# Patient Record
Sex: FEMALE | Race: WHITE | Employment: OTHER | URBAN - NONMETROPOLITAN AREA
[De-identification: names, ages, dates, MRNs, and addresses within clinical notes are randomized per-mention and may not be internally consistent; named-entity substitution may affect disease eponyms.]

---

## 2018-05-15 ENCOUNTER — HOSPITAL ENCOUNTER (OUTPATIENT)
Dept: MAMMOGRAPHY | Age: 73
Discharge: HOME | End: 2018-05-15
Payer: MEDICARE

## 2018-05-15 DIAGNOSIS — Z12.39 BREAST CANCER SCREENING: ICD-10-CM

## 2018-05-15 PROCEDURE — 77067 SCR MAMMO BI INCL CAD: CPT

## 2018-08-29 ENCOUNTER — HOSPITAL ENCOUNTER (OUTPATIENT)
Dept: ULTRASOUND IMAGING | Age: 73
Discharge: HOME | End: 2018-08-29
Payer: MEDICARE

## 2018-08-29 DIAGNOSIS — R31.0 GROSS HEMATURIA: ICD-10-CM

## 2018-08-29 PROCEDURE — 76770 US EXAM ABDO BACK WALL COMP: CPT

## 2019-05-21 ENCOUNTER — HOSPITAL ENCOUNTER (OUTPATIENT)
Dept: MAMMOGRAPHY | Age: 74
Discharge: HOME | End: 2019-05-21
Payer: MEDICARE

## 2019-05-21 DIAGNOSIS — Z12.31 VISIT FOR SCREENING MAMMOGRAM: ICD-10-CM

## 2019-05-21 PROCEDURE — 77067 SCR MAMMO BI INCL CAD: CPT

## 2020-06-01 ENCOUNTER — HOSPITAL ENCOUNTER (OUTPATIENT)
Dept: MAMMOGRAPHY | Age: 75
Discharge: HOME | End: 2020-06-01
Payer: MEDICARE

## 2020-06-01 PROCEDURE — 77063 BREAST TOMOSYNTHESIS BI: CPT

## 2021-03-15 ENCOUNTER — NURSE ONLY (OUTPATIENT)
Dept: LAB | Age: 76
End: 2021-03-15

## 2021-03-15 LAB
ALBUMIN SERPL-MCNC: 4.3 G/DL (ref 3.5–5.1)
ALP BLD-CCNC: 62 U/L (ref 38–126)
ALT SERPL-CCNC: 28 U/L (ref 11–66)
ANION GAP SERPL CALCULATED.3IONS-SCNC: 10 MEQ/L (ref 8–16)
AST SERPL-CCNC: 34 U/L (ref 5–40)
AVERAGE GLUCOSE: 105 MG/DL (ref 70–126)
BILIRUB SERPL-MCNC: 0.4 MG/DL (ref 0.3–1.2)
BUN BLDV-MCNC: 20 MG/DL (ref 7–22)
CALCIUM SERPL-MCNC: 9.7 MG/DL (ref 8.5–10.5)
CHLORIDE BLD-SCNC: 97 MEQ/L (ref 98–111)
CO2: 28 MEQ/L (ref 23–33)
CREAT SERPL-MCNC: 0.7 MG/DL (ref 0.4–1.2)
GFR SERPL CREATININE-BSD FRML MDRD: 81 ML/MIN/1.73M2
GLUCOSE BLD-MCNC: 97 MG/DL (ref 70–108)
HBA1C MFR BLD: 5.5 % (ref 4.4–6.4)
POTASSIUM SERPL-SCNC: 4.3 MEQ/L (ref 3.5–5.2)
SODIUM BLD-SCNC: 135 MEQ/L (ref 135–145)
TOTAL PROTEIN: 7.4 G/DL (ref 6.1–8)

## 2021-06-02 ENCOUNTER — HOSPITAL ENCOUNTER (OUTPATIENT)
Dept: MAMMOGRAPHY | Age: 76
Discharge: HOME | End: 2021-06-02
Payer: MEDICARE

## 2021-06-02 DIAGNOSIS — Z12.39 ENCOUNTER FOR OTHER SCREENING FOR MALIGNANT NEOPLASM OF BREAST: ICD-10-CM

## 2021-06-02 PROCEDURE — 77063 BREAST TOMOSYNTHESIS BI: CPT

## 2022-03-04 ENCOUNTER — NURSE ONLY (OUTPATIENT)
Dept: LAB | Age: 77
End: 2022-03-04

## 2022-03-04 LAB
ALBUMIN SERPL-MCNC: 4.3 G/DL (ref 3.5–5.1)
ALP BLD-CCNC: 73 U/L (ref 38–126)
ALT SERPL-CCNC: 27 U/L (ref 11–66)
ANION GAP SERPL CALCULATED.3IONS-SCNC: 15 MEQ/L (ref 8–16)
AST SERPL-CCNC: 33 U/L (ref 5–40)
AVERAGE GLUCOSE: 105 MG/DL (ref 70–126)
BILIRUB SERPL-MCNC: 0.4 MG/DL (ref 0.3–1.2)
BUN BLDV-MCNC: 28 MG/DL (ref 7–22)
CALCIUM SERPL-MCNC: 9.8 MG/DL (ref 8.5–10.5)
CHLORIDE BLD-SCNC: 97 MEQ/L (ref 98–111)
CHOLESTEROL, TOTAL: 218 MG/DL (ref 100–199)
CO2: 25 MEQ/L (ref 23–33)
CREAT SERPL-MCNC: 0.8 MG/DL (ref 0.4–1.2)
GFR SERPL CREATININE-BSD FRML MDRD: 70 ML/MIN/1.73M2
GLUCOSE BLD-MCNC: 96 MG/DL (ref 70–108)
HBA1C MFR BLD: 5.5 % (ref 4.4–6.4)
HDLC SERPL-MCNC: 57 MG/DL
LDL CHOLESTEROL CALCULATED: 144 MG/DL
POTASSIUM SERPL-SCNC: 4.3 MEQ/L (ref 3.5–5.2)
SODIUM BLD-SCNC: 137 MEQ/L (ref 135–145)
TOTAL PROTEIN: 6.7 G/DL (ref 6.1–8)
TRIGL SERPL-MCNC: 85 MG/DL (ref 0–199)

## 2022-06-06 ENCOUNTER — HOSPITAL ENCOUNTER (OUTPATIENT)
Dept: MAMMOGRAPHY | Age: 77
Discharge: HOME | End: 2022-06-06
Payer: MEDICARE

## 2022-06-06 DIAGNOSIS — Z12.31 VISIT FOR SCREENING MAMMOGRAM: ICD-10-CM

## 2022-06-06 PROCEDURE — 77067 SCR MAMMO BI INCL CAD: CPT

## 2022-06-06 PROCEDURE — 77063 BREAST TOMOSYNTHESIS BI: CPT

## 2023-06-07 ENCOUNTER — HOSPITAL ENCOUNTER (OUTPATIENT)
Dept: MAMMOGRAPHY | Age: 78
Discharge: HOME | End: 2023-06-07
Payer: MEDICARE

## 2023-06-07 DIAGNOSIS — Z12.31 VISIT FOR SCREENING MAMMOGRAM: ICD-10-CM

## 2023-06-07 PROCEDURE — 77063 BREAST TOMOSYNTHESIS BI: CPT

## 2024-03-11 ENCOUNTER — APPOINTMENT (OUTPATIENT)
Dept: GENERAL RADIOLOGY | Age: 79
DRG: 315 | End: 2024-03-11
Payer: MEDICARE

## 2024-03-11 ENCOUNTER — HOSPITAL ENCOUNTER (INPATIENT)
Age: 79
LOS: 7 days | Discharge: NURSING, REHAB OR LONG-TERM CARE | DRG: 315 | End: 2024-03-19
Attending: EMERGENCY MEDICINE | Admitting: STUDENT IN AN ORGANIZED HEALTH CARE EDUCATION/TRAINING PROGRAM
Payer: MEDICARE

## 2024-03-11 DIAGNOSIS — I48.91 ATRIAL FIBRILLATION WITH RAPID VENTRICULAR RESPONSE (HCC): Primary | ICD-10-CM

## 2024-03-11 DIAGNOSIS — Z96.651 STATUS POST RIGHT KNEE REPLACEMENT: ICD-10-CM

## 2024-03-11 LAB
ALBUMIN SERPL BCG-MCNC: 3.8 G/DL (ref 3.5–5.1)
ALP SERPL-CCNC: 81 U/L (ref 38–126)
ALT SERPL W/O P-5'-P-CCNC: 86 U/L (ref 11–66)
AMPHETAMINES UR QL SCN: NEGATIVE
ANION GAP SERPL CALC-SCNC: 12 MEQ/L (ref 8–16)
AST SERPL-CCNC: 114 U/L (ref 5–40)
BACTERIA URNS QL MICRO: ABNORMAL /HPF
BARBITURATES UR QL SCN: NEGATIVE
BASOPHILS ABSOLUTE: 0 THOU/MM3 (ref 0–0.1)
BASOPHILS NFR BLD AUTO: 0.3 %
BENZODIAZ UR QL SCN: POSITIVE
BILIRUB CONJ SERPL-MCNC: 0.3 MG/DL (ref 0–0.3)
BILIRUB SERPL-MCNC: 0.5 MG/DL (ref 0.3–1.2)
BILIRUB UR QL STRIP.AUTO: NEGATIVE
BUN SERPL-MCNC: 20 MG/DL (ref 7–22)
BZE UR QL SCN: NEGATIVE
CALCIUM SERPL-MCNC: 8.9 MG/DL (ref 8.5–10.5)
CANNABINOIDS UR QL SCN: NEGATIVE
CASTS #/AREA URNS LPF: ABNORMAL /LPF
CASTS 2: ABNORMAL /LPF
CHARACTER UR: CLEAR
CHLORIDE SERPL-SCNC: 99 MEQ/L (ref 98–111)
CO2 SERPL-SCNC: 21 MEQ/L (ref 23–33)
COLOR: YELLOW
CREAT SERPL-MCNC: 0.7 MG/DL (ref 0.4–1.2)
CRYSTALS URNS MICRO: ABNORMAL
DEPRECATED RDW RBC AUTO: 45 FL (ref 35–45)
EKG Q-T INTERVAL: 322 MS
EKG QRS DURATION: 80 MS
EKG QTC CALCULATION (BAZETT): 479 MS
EKG R AXIS: 74 DEGREES
EKG T AXIS: -90 DEGREES
EKG VENTRICULAR RATE: 133 BPM
EOSINOPHIL NFR BLD AUTO: 0.5 %
EOSINOPHILS ABSOLUTE: 0.1 THOU/MM3 (ref 0–0.4)
EPITHELIAL CELLS, UA: ABNORMAL /HPF
ERYTHROCYTE [DISTWIDTH] IN BLOOD BY AUTOMATED COUNT: 12.4 % (ref 11.5–14.5)
ETHANOL SERPL-MCNC: < 0.01 %
FENTANYL: NEGATIVE
GFR SERPL CREATININE-BSD FRML MDRD: > 60 ML/MIN/1.73M2
GLUCOSE SERPL-MCNC: 100 MG/DL (ref 70–108)
GLUCOSE UR QL STRIP.AUTO: NEGATIVE MG/DL
HCT VFR BLD AUTO: 39.4 % (ref 37–47)
HGB BLD-MCNC: 12.7 GM/DL (ref 12–16)
HGB UR QL STRIP.AUTO: ABNORMAL
IMM GRANULOCYTES # BLD AUTO: 0.08 THOU/MM3 (ref 0–0.07)
IMM GRANULOCYTES NFR BLD AUTO: 0.6 %
KETONES UR QL STRIP.AUTO: 15
LIPASE SERPL-CCNC: 31.3 U/L (ref 5.6–51.3)
LYMPHOCYTES ABSOLUTE: 0.9 THOU/MM3 (ref 1–4.8)
LYMPHOCYTES NFR BLD AUTO: 6.5 %
MAGNESIUM SERPL-MCNC: 2 MG/DL (ref 1.6–2.4)
MCH RBC QN AUTO: 31.5 PG (ref 26–33)
MCHC RBC AUTO-ENTMCNC: 32.2 GM/DL (ref 32.2–35.5)
MCV RBC AUTO: 97.8 FL (ref 81–99)
MISCELLANEOUS 2: ABNORMAL
MONOCYTES ABSOLUTE: 0.4 THOU/MM3 (ref 0.4–1.3)
MONOCYTES NFR BLD AUTO: 2.9 %
NEUTROPHILS NFR BLD AUTO: 89.2 %
NITRITE UR QL STRIP: NEGATIVE
NRBC BLD AUTO-RTO: 0 /100 WBC
NT-PROBNP SERPL IA-MCNC: 1012 PG/ML (ref 0–449)
OPIATES UR QL SCN: POSITIVE
OSMOLALITY SERPL CALC.SUM OF ELEC: 267.2 MOSMOL/KG (ref 275–300)
OXYCODONE: NEGATIVE
PCP UR QL SCN: NEGATIVE
PH UR STRIP.AUTO: 5.5 [PH] (ref 5–9)
PLATELET # BLD AUTO: 225 THOU/MM3 (ref 130–400)
PMV BLD AUTO: 8.4 FL (ref 9.4–12.4)
POTASSIUM SERPL-SCNC: 4.4 MEQ/L (ref 3.5–5.2)
PROT SERPL-MCNC: 6.1 G/DL (ref 6.1–8)
PROT UR STRIP.AUTO-MCNC: NEGATIVE MG/DL
RBC # BLD AUTO: 4.03 MILL/MM3 (ref 4.2–5.4)
RBC URINE: ABNORMAL /HPF
RENAL EPI CELLS #/AREA URNS HPF: ABNORMAL /[HPF]
SEGMENTED NEUTROPHILS ABSOLUTE COUNT: 12.3 THOU/MM3 (ref 1.8–7.7)
SODIUM SERPL-SCNC: 132 MEQ/L (ref 135–145)
SP GR UR REFRACT.AUTO: 1.02 (ref 1–1.03)
TROPONIN, HIGH SENSITIVITY: 12 NG/L (ref 0–12)
TSH SERPL DL<=0.005 MIU/L-ACNC: 3.62 UIU/ML (ref 0.4–4.2)
UROBILINOGEN, URINE: 0.2 EU/DL (ref 0–1)
WBC # BLD AUTO: 13.8 THOU/MM3 (ref 4.8–10.8)
WBC #/AREA URNS HPF: ABNORMAL /HPF
WBC #/AREA URNS HPF: NEGATIVE /[HPF]
YEAST LIKE FUNGI URNS QL MICRO: ABNORMAL

## 2024-03-11 PROCEDURE — 84443 ASSAY THYROID STIM HORMONE: CPT

## 2024-03-11 PROCEDURE — 36415 COLL VENOUS BLD VENIPUNCTURE: CPT

## 2024-03-11 PROCEDURE — 81001 URINALYSIS AUTO W/SCOPE: CPT

## 2024-03-11 PROCEDURE — 99285 EMERGENCY DEPT VISIT HI MDM: CPT

## 2024-03-11 PROCEDURE — 83735 ASSAY OF MAGNESIUM: CPT

## 2024-03-11 PROCEDURE — 82077 ASSAY SPEC XCP UR&BREATH IA: CPT

## 2024-03-11 PROCEDURE — 96360 HYDRATION IV INFUSION INIT: CPT

## 2024-03-11 PROCEDURE — 84484 ASSAY OF TROPONIN QUANT: CPT

## 2024-03-11 PROCEDURE — 2580000003 HC RX 258: Performed by: EMERGENCY MEDICINE

## 2024-03-11 PROCEDURE — 80053 COMPREHEN METABOLIC PANEL: CPT

## 2024-03-11 PROCEDURE — 85025 COMPLETE CBC W/AUTO DIFF WBC: CPT

## 2024-03-11 PROCEDURE — 82248 BILIRUBIN DIRECT: CPT

## 2024-03-11 PROCEDURE — 80307 DRUG TEST PRSMV CHEM ANLYZR: CPT

## 2024-03-11 PROCEDURE — 93010 ELECTROCARDIOGRAM REPORT: CPT | Performed by: INTERNAL MEDICINE

## 2024-03-11 PROCEDURE — 93005 ELECTROCARDIOGRAM TRACING: CPT | Performed by: EMERGENCY MEDICINE

## 2024-03-11 PROCEDURE — 81003 URINALYSIS AUTO W/O SCOPE: CPT

## 2024-03-11 PROCEDURE — 83690 ASSAY OF LIPASE: CPT

## 2024-03-11 PROCEDURE — 83880 ASSAY OF NATRIURETIC PEPTIDE: CPT

## 2024-03-11 PROCEDURE — 71045 X-RAY EXAM CHEST 1 VIEW: CPT

## 2024-03-11 RX ORDER — 0.9 % SODIUM CHLORIDE 0.9 %
1000 INTRAVENOUS SOLUTION INTRAVENOUS ONCE
Status: COMPLETED | OUTPATIENT
Start: 2024-03-11 | End: 2024-03-11

## 2024-03-11 RX ORDER — BISOPROLOL FUMARATE AND HYDROCHLOROTHIAZIDE 10; 6.25 MG/1; MG/1
1 TABLET ORAL DAILY
Status: ON HOLD | COMMUNITY
End: 2024-03-19 | Stop reason: HOSPADM

## 2024-03-11 RX ADMIN — SODIUM CHLORIDE 1000 ML: 9 INJECTION, SOLUTION INTRAVENOUS at 16:51

## 2024-03-11 NOTE — ED TRIAGE NOTES
Pt presents to the ED from [facility] post left knee replacement for evaluation of new onset afib-RVR and hypotension during the procedure. EMS reports the afib first occurred during the procedure which started at 1410. A second EKG is ordered at 1510 to confirm afib. Post procedure pt was given metoprolol and a liter of crystalloid fluids. Per EMS pt was induced with ketamine and propofol. She is not anticoagulated. On presentation pt opens eyes to voice. She is completely oriented. There is a 20G present in the right wrist. Pt appears pallored  and the skin in cool. Dr. Moyer present at the conclusion of triage.

## 2024-03-11 NOTE — ED PROVIDER NOTES
overnight stay.  They will see the patient in the morning.  I then called the hospitalist group discussed case with them.  They graciously excepted the admission.  I did tell them that orthopedics does not plan on starting any kind of anticoagulation till the morning.  Patient was only in atrial fibrillation for a matter of hours.  She has no history of bleeding risk.  I feel that this would be safe.  We had a long discussion with the patient at bedside who understood and agreed with the plan.  Patient is admitted in stable condition pending further evaluation and treatment.    CRITICAL CARE:   None    CONSULTS:  Orthopedics  Hospitalist    PROCEDURES:  None    FINAL IMPRESSION      1. Atrial fibrillation with rapid ventricular response (HCC)    2. Status post right knee replacement          DISPOSITION/PLAN   Admission    PATIENT REFERRED TO:  No follow-up provider specified.    DISCHARGE MEDICATIONS:  New Prescriptions    No medications on file       (Please note that portions of this note were completed with a voice recognition program.  Efforts were made to edit the dictations but occasionally words are mis-transcribed.)    DO João WANG John T, DO  03/12/24 0130

## 2024-03-11 NOTE — ED NOTES
ED to inpatient nurses report      Chief Complaint:  Chief Complaint   Patient presents with    Tachycardia    Hypotension     Present to ED from: home    MOA:     LOC: alert and orientated to name, place, date  Mobility: Requires assistance * 1  Oxygen Baseline: RA    Current needs required: RA     Code Status:   No Order    What abnormal results were found and what did you give/do to treat them? AFIB-RVR  Any procedures or intervention occur? no    Mental Status:  Level of Consciousness: Alert (0)    Psych Assessment:        Vitals:  Patient Vitals for the past 24 hrs:   BP Temp Pulse Resp SpO2 Height Weight   03/11/24 1829 132/74 -- 94 16 99 % -- --   03/11/24 1749 94/64 97.3 °F (36.3 °C) (!) 124 16 96 % 1.6 m (5' 3\") 63.5 kg (140 lb)   03/11/24 1659 105/68 -- (!) 122 -- 96 % -- --   03/11/24 1646 -- -- (!) 133 -- 94 % -- --   03/11/24 1642 (!) 95/18 -- (!) 130 -- -- -- --        LDAs:   Peripheral IV 03/11/24 Posterior;Right Hand (Active)   Site Assessment Clean, dry & intact 03/11/24 1755   Line Status Normal saline locked 03/11/24 1755   Line Care Connections checked and tightened 03/11/24 1755   Phlebitis Assessment No symptoms 03/11/24 1755   Infiltration Assessment 0 03/11/24 1755   Dressing Status Clean, dry & intact 03/11/24 1755   Dressing Type Transparent 03/11/24 1755       Peripheral IV 03/11/24 Distal;Left;Posterior Forearm (Active)   Site Assessment Clean, dry & intact 03/11/24 1650   Line Status Normal saline locked 03/11/24 1650   Line Care Connections checked and tightened 03/11/24 1650   Phlebitis Assessment No symptoms 03/11/24 1650   Infiltration Assessment 0 03/11/24 1650   Dressing Status Clean, dry & intact 03/11/24 1650   Dressing Type Transparent 03/11/24 1650       Ambulatory Status:  No data recorded    Diagnosis:  DISPOSITION Decision To Admit 03/11/2024 06:56:31 PM   Final diagnoses:   Atrial fibrillation with rapid ventricular response (HCC)   Status post right knee replacement

## 2024-03-12 ENCOUNTER — APPOINTMENT (OUTPATIENT)
Age: 79
DRG: 315 | End: 2024-03-12
Attending: NURSE PRACTITIONER
Payer: MEDICARE

## 2024-03-12 PROBLEM — E87.1 CHRONIC HYPONATREMIA: Status: ACTIVE | Noted: 2024-03-12

## 2024-03-12 PROBLEM — D72.829 LEUKOCYTOSIS: Status: ACTIVE | Noted: 2024-03-12

## 2024-03-12 PROBLEM — Z96.652 S/P TKR (TOTAL KNEE REPLACEMENT), LEFT: Status: ACTIVE | Noted: 2024-03-12

## 2024-03-12 PROBLEM — I48.91 ATRIAL FIBRILLATION WITH RAPID VENTRICULAR RESPONSE (HCC): Status: ACTIVE | Noted: 2024-03-12

## 2024-03-12 PROBLEM — R74.8 ELEVATED LIVER ENZYMES: Status: ACTIVE | Noted: 2024-03-12

## 2024-03-12 PROBLEM — Z86.79 HISTORY OF ESSENTIAL HYPERTENSION: Status: ACTIVE | Noted: 2024-03-12

## 2024-03-12 PROBLEM — I48.91 NEW ONSET ATRIAL FIBRILLATION (HCC): Status: ACTIVE | Noted: 2024-03-12

## 2024-03-12 LAB
ALBUMIN SERPL BCG-MCNC: 3.3 G/DL (ref 3.5–5.1)
ALP SERPL-CCNC: 73 U/L (ref 38–126)
ALT SERPL W/O P-5'-P-CCNC: 70 U/L (ref 11–66)
ANION GAP SERPL CALC-SCNC: 13 MEQ/L (ref 8–16)
APAP SERPL-MCNC: < 5 UG/ML (ref 0–20)
AST SERPL-CCNC: 65 U/L (ref 5–40)
BASOPHILS ABSOLUTE: 0 THOU/MM3 (ref 0–0.1)
BASOPHILS NFR BLD AUTO: 0 %
BILIRUB SERPL-MCNC: 0.3 MG/DL (ref 0.3–1.2)
BUN SERPL-MCNC: 20 MG/DL (ref 7–22)
CALCIUM SERPL-MCNC: 8.7 MG/DL (ref 8.5–10.5)
CHLORIDE SERPL-SCNC: 100 MEQ/L (ref 98–111)
CHOLEST SERPL-MCNC: 154 MG/DL (ref 100–199)
CO2 SERPL-SCNC: 19 MEQ/L (ref 23–33)
CREAT SERPL-MCNC: 0.7 MG/DL (ref 0.4–1.2)
CRP SERPL-MCNC: 0.91 MG/DL (ref 0–1)
DEPRECATED RDW RBC AUTO: 44.4 FL (ref 35–45)
ECHO AO ASC DIAM: 2.4 CM
ECHO AO ASCENDING AORTA INDEX: 1.45 CM/M2
ECHO AV CUSP MM: 1.7 CM
ECHO AV PEAK GRADIENT: 6 MMHG
ECHO AV PEAK VELOCITY: 1.2 M/S
ECHO AV VELOCITY RATIO: 0.75
ECHO BSA: 1.68 M2
ECHO EST RA PRESSURE: 5 MMHG
ECHO LA AREA 2C: 10.2 CM2
ECHO LA AREA 4C: 10.9 CM2
ECHO LA DIAMETER INDEX: 1.63 CM/M2
ECHO LA DIAMETER: 2.7 CM
ECHO LA MAJOR AXIS: 3.7 CM
ECHO LA MINOR AXIS: 3.8 CM
ECHO LA VOL BP: 23 ML (ref 22–52)
ECHO LA VOL MOD A2C: 20 ML (ref 22–52)
ECHO LA VOL MOD A4C: 25 ML (ref 22–52)
ECHO LA VOL/BSA BIPLANE: 14 ML/M2 (ref 16–34)
ECHO LA VOLUME INDEX MOD A2C: 12 ML/M2 (ref 16–34)
ECHO LA VOLUME INDEX MOD A4C: 15 ML/M2 (ref 16–34)
ECHO LV FRACTIONAL SHORTENING: 29 % (ref 28–44)
ECHO LV INTERNAL DIMENSION DIASTOLE INDEX: 2.11 CM/M2
ECHO LV INTERNAL DIMENSION DIASTOLIC: 3.5 CM (ref 3.9–5.3)
ECHO LV INTERNAL DIMENSION SYSTOLIC INDEX: 1.51 CM/M2
ECHO LV INTERNAL DIMENSION SYSTOLIC: 2.5 CM
ECHO LV ISOVOLUMETRIC RELAXATION TIME (IVRT): 56 MS
ECHO LV IVSD: 0.9 CM (ref 0.6–0.9)
ECHO LV MASS 2D: 75.3 G (ref 67–162)
ECHO LV MASS INDEX 2D: 45.4 G/M2 (ref 43–95)
ECHO LV POSTERIOR WALL DIASTOLIC: 0.7 CM (ref 0.6–0.9)
ECHO LV RELATIVE WALL THICKNESS RATIO: 0.4
ECHO LVOT PEAK GRADIENT: 3 MMHG
ECHO LVOT PEAK VELOCITY: 0.9 M/S
ECHO MV A VELOCITY: 0.71 M/S
ECHO MV E DECELERATION TIME (DT): 201 MS
ECHO MV E VELOCITY: 1.03 M/S
ECHO MV E/A RATIO: 1.45
ECHO MV REGURGITANT PEAK GRADIENT: 55 MMHG
ECHO MV REGURGITANT PEAK VELOCITY: 3.7 M/S
ECHO PULMONARY ARTERY END DIASTOLIC PRESSURE: 3 MMHG
ECHO PV MAX VELOCITY: 0.7 M/S
ECHO PV PEAK GRADIENT: 2 MMHG
ECHO PV REGURGITANT MAX VELOCITY: 0.9 M/S
ECHO RIGHT VENTRICULAR SYSTOLIC PRESSURE (RVSP): 37 MMHG
ECHO RV INTERNAL DIMENSION: 2 CM
ECHO RV TAPSE: 2 CM (ref 1.7–?)
ECHO TV E WAVE: 0.7 M/S
ECHO TV REGURGITANT MAX VELOCITY: 2.81 M/S
ECHO TV REGURGITANT PEAK GRADIENT: 32 MMHG
EKG ATRIAL RATE: 85 BPM
EKG ATRIAL RATE: 96 BPM
EKG P AXIS: 63 DEGREES
EKG P AXIS: 64 DEGREES
EKG P-R INTERVAL: 150 MS
EKG P-R INTERVAL: 186 MS
EKG Q-T INTERVAL: 366 MS
EKG Q-T INTERVAL: 366 MS
EKG QRS DURATION: 70 MS
EKG QRS DURATION: 70 MS
EKG QTC CALCULATION (BAZETT): 435 MS
EKG QTC CALCULATION (BAZETT): 462 MS
EKG R AXIS: 45 DEGREES
EKG R AXIS: 61 DEGREES
EKG T AXIS: 32 DEGREES
EKG T AXIS: 40 DEGREES
EKG VENTRICULAR RATE: 85 BPM
EKG VENTRICULAR RATE: 96 BPM
EOSINOPHIL NFR BLD AUTO: 0 %
EOSINOPHILS ABSOLUTE: 0 THOU/MM3 (ref 0–0.4)
ERYTHROCYTE [DISTWIDTH] IN BLOOD BY AUTOMATED COUNT: 12.4 % (ref 11.5–14.5)
GFR SERPL CREATININE-BSD FRML MDRD: > 60 ML/MIN/1.73M2
GLUCOSE SERPL-MCNC: 115 MG/DL (ref 70–108)
HAV IGM SER QL: NEGATIVE
HBV CORE IGM SERPL QL IA: NEGATIVE
HBV SURFACE AG SERPL QL IA: NEGATIVE
HCT VFR BLD AUTO: 35 % (ref 37–47)
HCV IGG SERPL QL IA: NEGATIVE
HDLC SERPL-MCNC: 53 MG/DL
HGB BLD-MCNC: 11.3 GM/DL (ref 12–16)
IMM GRANULOCYTES # BLD AUTO: 0.02 THOU/MM3 (ref 0–0.07)
IMM GRANULOCYTES NFR BLD AUTO: 0.2 %
INR PPP: 0.95 (ref 0.85–1.13)
LDLC SERPL CALC-MCNC: 90 MG/DL
LYMPHOCYTES ABSOLUTE: 0.7 THOU/MM3 (ref 1–4.8)
LYMPHOCYTES NFR BLD AUTO: 7.4 %
MCH RBC QN AUTO: 31.5 PG (ref 26–33)
MCHC RBC AUTO-ENTMCNC: 32.3 GM/DL (ref 32.2–35.5)
MCV RBC AUTO: 97.5 FL (ref 81–99)
MONOCYTES ABSOLUTE: 0.7 THOU/MM3 (ref 0.4–1.3)
MONOCYTES NFR BLD AUTO: 6.7 %
NEUTROPHILS NFR BLD AUTO: 85.7 %
NRBC BLD AUTO-RTO: 0 /100 WBC
OSMOLALITY SERPL CALC.SUM OF ELEC: 268.1 MOSMOL/KG (ref 275–300)
OSMOLALITY SERPL: 282 MOSMOL/KG (ref 275–295)
PLATELET # BLD AUTO: 216 THOU/MM3 (ref 130–400)
PMV BLD AUTO: 8.5 FL (ref 9.4–12.4)
POTASSIUM SERPL-SCNC: 4.5 MEQ/L (ref 3.5–5.2)
PROT SERPL-MCNC: 5.7 G/DL (ref 6.1–8)
RBC # BLD AUTO: 3.59 MILL/MM3 (ref 4.2–5.4)
SALICYLATES SERPL-MCNC: 1.1 MG/DL (ref 2–10)
SEGMENTED NEUTROPHILS ABSOLUTE COUNT: 8.7 THOU/MM3 (ref 1.8–7.7)
SODIUM SERPL-SCNC: 132 MEQ/L (ref 135–145)
TRIGL SERPL-MCNC: 54 MG/DL (ref 0–199)
URATE SERPL-MCNC: 4.9 MG/DL (ref 2.4–5.7)
WBC # BLD AUTO: 10.1 THOU/MM3 (ref 4.8–10.8)

## 2024-03-12 PROCEDURE — 36415 COLL VENOUS BLD VENIPUNCTURE: CPT

## 2024-03-12 PROCEDURE — 6370000000 HC RX 637 (ALT 250 FOR IP): Performed by: NURSE PRACTITIONER

## 2024-03-12 PROCEDURE — 93306 TTE W/DOPPLER COMPLETE: CPT | Performed by: INTERNAL MEDICINE

## 2024-03-12 PROCEDURE — 80061 LIPID PANEL: CPT

## 2024-03-12 PROCEDURE — 93306 TTE W/DOPPLER COMPLETE: CPT

## 2024-03-12 PROCEDURE — 93010 ELECTROCARDIOGRAM REPORT: CPT | Performed by: INTERNAL MEDICINE

## 2024-03-12 PROCEDURE — 80179 DRUG ASSAY SALICYLATE: CPT

## 2024-03-12 PROCEDURE — 99223 1ST HOSP IP/OBS HIGH 75: CPT | Performed by: NURSE PRACTITIONER

## 2024-03-12 PROCEDURE — 1200000003 HC TELEMETRY R&B

## 2024-03-12 PROCEDURE — 99223 1ST HOSP IP/OBS HIGH 75: CPT | Performed by: INTERNAL MEDICINE

## 2024-03-12 PROCEDURE — 85610 PROTHROMBIN TIME: CPT

## 2024-03-12 PROCEDURE — 87040 BLOOD CULTURE FOR BACTERIA: CPT

## 2024-03-12 PROCEDURE — 85025 COMPLETE CBC W/AUTO DIFF WBC: CPT

## 2024-03-12 PROCEDURE — 84550 ASSAY OF BLOOD/URIC ACID: CPT

## 2024-03-12 PROCEDURE — 80053 COMPREHEN METABOLIC PANEL: CPT

## 2024-03-12 PROCEDURE — 97110 THERAPEUTIC EXERCISES: CPT

## 2024-03-12 PROCEDURE — 80143 DRUG ASSAY ACETAMINOPHEN: CPT

## 2024-03-12 PROCEDURE — 97162 PT EVAL MOD COMPLEX 30 MIN: CPT

## 2024-03-12 PROCEDURE — 2580000003 HC RX 258: Performed by: NURSE PRACTITIONER

## 2024-03-12 PROCEDURE — 6360000002 HC RX W HCPCS: Performed by: NURSE PRACTITIONER

## 2024-03-12 PROCEDURE — 6370000000 HC RX 637 (ALT 250 FOR IP): Performed by: PHYSICIAN ASSISTANT

## 2024-03-12 PROCEDURE — 93005 ELECTROCARDIOGRAM TRACING: CPT | Performed by: NURSE PRACTITIONER

## 2024-03-12 PROCEDURE — 80074 ACUTE HEPATITIS PANEL: CPT

## 2024-03-12 PROCEDURE — 83930 ASSAY OF BLOOD OSMOLALITY: CPT

## 2024-03-12 PROCEDURE — 86140 C-REACTIVE PROTEIN: CPT

## 2024-03-12 RX ORDER — ASPIRIN 325 MG
162 TABLET ORAL DAILY
Status: DISCONTINUED | OUTPATIENT
Start: 2024-03-12 | End: 2024-03-15

## 2024-03-12 RX ORDER — ENOXAPARIN SODIUM 100 MG/ML
1 INJECTION SUBCUTANEOUS EVERY 12 HOURS
Status: DISCONTINUED | OUTPATIENT
Start: 2024-03-12 | End: 2024-03-12

## 2024-03-12 RX ORDER — ONDANSETRON 2 MG/ML
4 INJECTION INTRAMUSCULAR; INTRAVENOUS EVERY 6 HOURS PRN
Status: DISCONTINUED | OUTPATIENT
Start: 2024-03-12 | End: 2024-03-19 | Stop reason: HOSPADM

## 2024-03-12 RX ORDER — POTASSIUM CHLORIDE 7.45 MG/ML
10 INJECTION INTRAVENOUS PRN
Status: DISCONTINUED | OUTPATIENT
Start: 2024-03-12 | End: 2024-03-19 | Stop reason: HOSPADM

## 2024-03-12 RX ORDER — SODIUM CHLORIDE 9 MG/ML
INJECTION, SOLUTION INTRAVENOUS PRN
Status: DISCONTINUED | OUTPATIENT
Start: 2024-03-12 | End: 2024-03-19 | Stop reason: HOSPADM

## 2024-03-12 RX ORDER — SODIUM CHLORIDE 0.9 % (FLUSH) 0.9 %
5-40 SYRINGE (ML) INJECTION EVERY 12 HOURS SCHEDULED
Status: DISCONTINUED | OUTPATIENT
Start: 2024-03-12 | End: 2024-03-19 | Stop reason: HOSPADM

## 2024-03-12 RX ORDER — MULTIVITAMIN WITH IRON
1 TABLET ORAL DAILY
Status: DISCONTINUED | OUTPATIENT
Start: 2024-03-12 | End: 2024-03-19 | Stop reason: HOSPADM

## 2024-03-12 RX ORDER — ACETAMINOPHEN 325 MG/1
650 TABLET ORAL EVERY 6 HOURS PRN
Status: DISCONTINUED | OUTPATIENT
Start: 2024-03-12 | End: 2024-03-18

## 2024-03-12 RX ORDER — ACETAMINOPHEN 500 MG
1000 TABLET ORAL
Status: ON HOLD | COMMUNITY

## 2024-03-12 RX ORDER — HYDROCODONE BITARTRATE AND ACETAMINOPHEN 5; 325 MG/1; MG/1
1 TABLET ORAL EVERY 4 HOURS PRN
Status: DISCONTINUED | OUTPATIENT
Start: 2024-03-12 | End: 2024-03-13

## 2024-03-12 RX ORDER — METOPROLOL SUCCINATE 100 MG/1
100 TABLET, EXTENDED RELEASE ORAL DAILY
Status: DISCONTINUED | OUTPATIENT
Start: 2024-03-12 | End: 2024-03-13

## 2024-03-12 RX ORDER — ACETAMINOPHEN 650 MG/1
650 SUPPOSITORY RECTAL EVERY 6 HOURS PRN
Status: DISCONTINUED | OUTPATIENT
Start: 2024-03-12 | End: 2024-03-18

## 2024-03-12 RX ORDER — ONDANSETRON 4 MG/1
4 TABLET, ORALLY DISINTEGRATING ORAL EVERY 8 HOURS PRN
Status: DISCONTINUED | OUTPATIENT
Start: 2024-03-12 | End: 2024-03-19 | Stop reason: HOSPADM

## 2024-03-12 RX ORDER — POTASSIUM CHLORIDE 20 MEQ/1
40 TABLET, EXTENDED RELEASE ORAL PRN
Status: DISCONTINUED | OUTPATIENT
Start: 2024-03-12 | End: 2024-03-19 | Stop reason: HOSPADM

## 2024-03-12 RX ORDER — MULTIVIT-MIN/IRON/FOLIC ACID/K 18-600-40
2000 CAPSULE ORAL DAILY
Status: ON HOLD | COMMUNITY

## 2024-03-12 RX ORDER — ONDANSETRON 4 MG/1
4 TABLET, ORALLY DISINTEGRATING ORAL EVERY 8 HOURS PRN
Status: DISCONTINUED | OUTPATIENT
Start: 2024-03-12 | End: 2024-03-15

## 2024-03-12 RX ORDER — SODIUM CHLORIDE 0.9 % (FLUSH) 0.9 %
5-40 SYRINGE (ML) INJECTION PRN
Status: DISCONTINUED | OUTPATIENT
Start: 2024-03-12 | End: 2024-03-19 | Stop reason: HOSPADM

## 2024-03-12 RX ORDER — POLYETHYLENE GLYCOL 3350 17 G/17G
17 POWDER, FOR SOLUTION ORAL DAILY PRN
Status: DISCONTINUED | OUTPATIENT
Start: 2024-03-12 | End: 2024-03-19 | Stop reason: HOSPADM

## 2024-03-12 RX ORDER — ONDANSETRON 2 MG/ML
4 INJECTION INTRAMUSCULAR; INTRAVENOUS EVERY 6 HOURS PRN
Status: DISCONTINUED | OUTPATIENT
Start: 2024-03-12 | End: 2024-03-15

## 2024-03-12 RX ORDER — MAGNESIUM SULFATE IN WATER 40 MG/ML
2000 INJECTION, SOLUTION INTRAVENOUS PRN
Status: DISCONTINUED | OUTPATIENT
Start: 2024-03-12 | End: 2024-03-19 | Stop reason: HOSPADM

## 2024-03-12 RX ORDER — DIPHENHYDRAMINE HCL 25 MG
25 CAPSULE ORAL
Status: ON HOLD | COMMUNITY

## 2024-03-12 RX ADMIN — ASPIRIN 162 MG: 325 TABLET ORAL at 12:28

## 2024-03-12 RX ADMIN — SODIUM CHLORIDE, PRESERVATIVE FREE 10 ML: 5 INJECTION INTRAVENOUS at 12:29

## 2024-03-12 RX ADMIN — HYDROCODONE BITARTRATE AND ACETAMINOPHEN 1 TABLET: 5; 325 TABLET ORAL at 19:26

## 2024-03-12 RX ADMIN — RIVAROXABAN 10 MG: 10 TABLET, FILM COATED ORAL at 18:21

## 2024-03-12 RX ADMIN — HYDROCODONE BITARTRATE AND ACETAMINOPHEN 1 TABLET: 5; 325 TABLET ORAL at 15:27

## 2024-03-12 RX ADMIN — ENOXAPARIN SODIUM 60 MG: 100 INJECTION SUBCUTANEOUS at 12:28

## 2024-03-12 RX ADMIN — SODIUM CHLORIDE, PRESERVATIVE FREE 10 ML: 5 INJECTION INTRAVENOUS at 22:13

## 2024-03-12 ASSESSMENT — PAIN DESCRIPTION - LOCATION
LOCATION: KNEE

## 2024-03-12 ASSESSMENT — PAIN SCALES - GENERAL
PAINLEVEL_OUTOF10: 10

## 2024-03-12 ASSESSMENT — PAIN DESCRIPTION - DESCRIPTORS
DESCRIPTORS: ACHING;DISCOMFORT
DESCRIPTORS: ACHING;DISCOMFORT

## 2024-03-12 ASSESSMENT — PAIN DESCRIPTION - ORIENTATION
ORIENTATION: LEFT

## 2024-03-12 ASSESSMENT — PAIN - FUNCTIONAL ASSESSMENT: PAIN_FUNCTIONAL_ASSESSMENT: NONE - DENIES PAIN

## 2024-03-12 NOTE — PLAN OF CARE
Patient admitted to room 8A12 from ED.  Patient oriented to room, call light, bed rails, phone, lights and bathroom.  Patient instructed about the schedule of the day including: vital sign frequency, lab draws, possible tests, frequency of MD and staff rounds, including RN/MD rounding together at bedside, daily weights, and I &O's.  Patient instructed about prescribed diet, how to use  television.   bed alarm in place, patient aware of placement and reason.   Telemetry box  in place, patient aware of placement and reason.  Bed locked, in lowest position, side rails up 2/4, call light within reach.  Will continue to monitor.

## 2024-03-12 NOTE — ED NOTES
Pt taken off of bedpan at this time. Pt voided and urine sample collected. Pt and family updated on plan of care.

## 2024-03-12 NOTE — CARE COORDINATION
Spoke with patient and daughter who advised son has ACP documents and can bring in this evening to have attached to chart. Denied concerns or needs.

## 2024-03-12 NOTE — PLAN OF CARE
Patient admitted after midnight.  Seen and examined at bedside.    -Cardiology consulted and following for new onset A-fib RVR following surgery.  -Echo pending, continue metoprolol 100 mg daily.  -30-day event monitor at discharge, decision for anticoagulation based on this.\  -Patient to work with PT/OT, plan to go to [facility] at discharge. unsure if she will need pre-CERT at this time.    Electronically signed by Mayuri Santacruz PA-C on 3/12/2024 at 2:29 PM

## 2024-03-12 NOTE — H&P
Hospitalist  History and Physical    Patient:  Kirstin Estrada  MRN: [id number]    CHIEF COMPLAINT:  tachycardia and hypotension    History Obtained From:  patient, electronic medical record  PCP: Evelio Hennessy MD    HISTORY OF PRESENT ILLNESS:   Kirstin Estrada is a 78 year old female presented to [facility] ER 3/11/24 via EMS from [facility] for evaluation of new onset atrial fibrillation RVR and hypotension during procedure.     Patient has a past medical history significant for lifetime nonsmoker, essential hypertension, dyslipidemia, hyponatremia.  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  Patient identifies on 2/27/2024 preop testing revealed urine with small amount of blood patient was treated with 5-day course of Cipro.  She also identifies sodium level was 124 patient had taken 4 days of salt tablets with repeat sodium on 3/7/2024 135.    Patient underwent left total knee replacement under the care of Dr. Beaulieu at [facility] on 3/11/2024 with spinal and nerve block ASA.    Kayla was noted to be in A-fib with RVR that was associated with hypotension. -140's and SBP .  ER documentation reveals they gave her IVP beta-blocker and IV fluids.   On arrival to [facility] ER heart rate 130 and B/P 95/18 EKG revealed Sinus Tachycardia.   Patient is awake alert and oriented. She is not complaining of any pain. She is still wearing off the anesthesia. Patient is able to move all her extremities. She can wiggle her toes. She has bilateral pneumatic leg compression devices on. Patient states she did take Ziac the AM of surgery.  She does not take any anticoagulation other than 81 mg aspirin.  Patient denied any shortness of breath, chest pain pressure heaviness or tightness.    Past Medical History:    See HPI    Past Surgical History:        Procedure Laterality Date    HYSTERECTOMY (CERVIX STATUS UNKNOWN)  1995    OVARY REMOVAL  1995       Medications Prior to Admission:    Prior to Admission medications    Medication Sig Start Date End Date Taking? 
show

## 2024-03-12 NOTE — PLAN OF CARE
Metoprolol not given. Patient stated she took her home dose of Ziac this AM. Educated patient on need to only take prescribed meds while here. Voiced understanding.

## 2024-03-12 NOTE — ED NOTES
Patient to be transferred to [facility]. Patient is in stable condition at this time. Floor staff Natalie notified prior to transfer.

## 2024-03-13 LAB
ANION GAP SERPL CALC-SCNC: 13 MEQ/L (ref 8–16)
BUN SERPL-MCNC: 16 MG/DL (ref 7–22)
CALCIUM SERPL-MCNC: 8.7 MG/DL (ref 8.5–10.5)
CHLORIDE SERPL-SCNC: 95 MEQ/L (ref 98–111)
CO2 SERPL-SCNC: 21 MEQ/L (ref 23–33)
CREAT SERPL-MCNC: 0.5 MG/DL (ref 0.4–1.2)
EKG ATRIAL RATE: 74 BPM
EKG P AXIS: 41 DEGREES
EKG P-R INTERVAL: 158 MS
EKG Q-T INTERVAL: 296 MS
EKG Q-T INTERVAL: 370 MS
EKG QRS DURATION: 70 MS
EKG QRS DURATION: 74 MS
EKG QTC CALCULATION (BAZETT): 410 MS
EKG QTC CALCULATION (BAZETT): 445 MS
EKG R AXIS: 61 DEGREES
EKG R AXIS: 81 DEGREES
EKG T AXIS: -44 DEGREES
EKG T AXIS: 29 DEGREES
EKG VENTRICULAR RATE: 136 BPM
EKG VENTRICULAR RATE: 74 BPM
GFR SERPL CREATININE-BSD FRML MDRD: > 60 ML/MIN/1.73M2
GLUCOSE SERPL-MCNC: 108 MG/DL (ref 70–108)
MAGNESIUM SERPL-MCNC: 1.7 MG/DL (ref 1.6–2.4)
OSMOLALITY UR: 727 MOSMOL/KG (ref 250–750)
POTASSIUM SERPL-SCNC: 3.9 MEQ/L (ref 3.5–5.2)
SODIUM SERPL-SCNC: 129 MEQ/L (ref 135–145)
SODIUM UR-SCNC: 106 MEQ/L

## 2024-03-13 PROCEDURE — 2140000000 HC CCU INTERMEDIATE R&B

## 2024-03-13 PROCEDURE — 6370000000 HC RX 637 (ALT 250 FOR IP): Performed by: PHYSICIAN ASSISTANT

## 2024-03-13 PROCEDURE — 93005 ELECTROCARDIOGRAM TRACING: CPT | Performed by: PHYSICIAN ASSISTANT

## 2024-03-13 PROCEDURE — 2500000003 HC RX 250 WO HCPCS: Performed by: REGISTERED NURSE

## 2024-03-13 PROCEDURE — 84300 ASSAY OF URINE SODIUM: CPT

## 2024-03-13 PROCEDURE — 2580000003 HC RX 258: Performed by: REGISTERED NURSE

## 2024-03-13 PROCEDURE — 2580000003 HC RX 258: Performed by: NURSE PRACTITIONER

## 2024-03-13 PROCEDURE — 83935 ASSAY OF URINE OSMOLALITY: CPT

## 2024-03-13 PROCEDURE — 83735 ASSAY OF MAGNESIUM: CPT

## 2024-03-13 PROCEDURE — 6370000000 HC RX 637 (ALT 250 FOR IP): Performed by: REGISTERED NURSE

## 2024-03-13 PROCEDURE — 99232 SBSQ HOSP IP/OBS MODERATE 35: CPT | Performed by: PHYSICIAN ASSISTANT

## 2024-03-13 PROCEDURE — 6370000000 HC RX 637 (ALT 250 FOR IP)

## 2024-03-13 PROCEDURE — 6370000000 HC RX 637 (ALT 250 FOR IP): Performed by: NURSE PRACTITIONER

## 2024-03-13 PROCEDURE — 36415 COLL VENOUS BLD VENIPUNCTURE: CPT

## 2024-03-13 PROCEDURE — 99232 SBSQ HOSP IP/OBS MODERATE 35: CPT | Performed by: REGISTERED NURSE

## 2024-03-13 PROCEDURE — 93010 ELECTROCARDIOGRAM REPORT: CPT | Performed by: INTERNAL MEDICINE

## 2024-03-13 PROCEDURE — 80048 BASIC METABOLIC PNL TOTAL CA: CPT

## 2024-03-13 RX ORDER — HYDROCODONE BITARTRATE AND ACETAMINOPHEN 5; 325 MG/1; MG/1
2 TABLET ORAL ONCE
Status: COMPLETED | OUTPATIENT
Start: 2024-03-13 | End: 2024-03-13

## 2024-03-13 RX ORDER — SODIUM CHLORIDE 0.9 % (FLUSH) 0.9 %
5-40 SYRINGE (ML) INJECTION EVERY 12 HOURS SCHEDULED
Status: CANCELLED | OUTPATIENT
Start: 2024-03-13

## 2024-03-13 RX ORDER — SODIUM CHLORIDE 9 MG/ML
INJECTION, SOLUTION INTRAVENOUS PRN
Status: CANCELLED | OUTPATIENT
Start: 2024-03-13

## 2024-03-13 RX ORDER — HYDROCODONE BITARTRATE AND ACETAMINOPHEN 5; 325 MG/1; MG/1
1 TABLET ORAL EVERY 4 HOURS PRN
Status: DISCONTINUED | OUTPATIENT
Start: 2024-03-13 | End: 2024-03-19 | Stop reason: HOSPADM

## 2024-03-13 RX ORDER — SODIUM CHLORIDE 0.9 % (FLUSH) 0.9 %
5-40 SYRINGE (ML) INJECTION PRN
Status: CANCELLED | OUTPATIENT
Start: 2024-03-13

## 2024-03-13 RX ORDER — METOPROLOL TARTRATE 1 MG/ML
5 INJECTION, SOLUTION INTRAVENOUS ONCE
Status: COMPLETED | OUTPATIENT
Start: 2024-03-13 | End: 2024-03-13

## 2024-03-13 RX ORDER — HYDROCODONE BITARTRATE AND ACETAMINOPHEN 5; 325 MG/1; MG/1
2 TABLET ORAL EVERY 4 HOURS PRN
Status: DISCONTINUED | OUTPATIENT
Start: 2024-03-13 | End: 2024-03-19 | Stop reason: HOSPADM

## 2024-03-13 RX ADMIN — HYDROCODONE BITARTRATE AND ACETAMINOPHEN 2 TABLET: 5; 325 TABLET ORAL at 04:36

## 2024-03-13 RX ADMIN — ASPIRIN 162 MG: 325 TABLET ORAL at 08:27

## 2024-03-13 RX ADMIN — APIXABAN 5 MG: 5 TABLET, FILM COATED ORAL at 17:26

## 2024-03-13 RX ADMIN — HYDROCODONE BITARTRATE AND ACETAMINOPHEN 2 TABLET: 5; 325 TABLET ORAL at 12:30

## 2024-03-13 RX ADMIN — SODIUM CHLORIDE, PRESERVATIVE FREE 10 ML: 5 INJECTION INTRAVENOUS at 08:28

## 2024-03-13 RX ADMIN — Medication 1 TABLET: at 08:27

## 2024-03-13 RX ADMIN — DILTIAZEM HYDROCHLORIDE 5 MG/HR: 5 INJECTION INTRAVENOUS at 10:13

## 2024-03-13 RX ADMIN — METOPROLOL SUCCINATE 100 MG: 100 TABLET, EXTENDED RELEASE ORAL at 08:27

## 2024-03-13 RX ADMIN — HYDROCODONE BITARTRATE AND ACETAMINOPHEN 1 TABLET: 5; 325 TABLET ORAL at 08:32

## 2024-03-13 RX ADMIN — DILTIAZEM HYDROCHLORIDE 10 MG/HR: 5 INJECTION INTRAVENOUS at 19:47

## 2024-03-13 RX ADMIN — HYDROCODONE BITARTRATE AND ACETAMINOPHEN 2 TABLET: 5; 325 TABLET ORAL at 21:14

## 2024-03-13 RX ADMIN — SODIUM CHLORIDE, PRESERVATIVE FREE 10 ML: 5 INJECTION INTRAVENOUS at 19:52

## 2024-03-13 RX ADMIN — METOPROLOL TARTRATE 5 MG: 5 INJECTION INTRAVENOUS at 08:32

## 2024-03-13 ASSESSMENT — PAIN DESCRIPTION - ORIENTATION
ORIENTATION: LEFT

## 2024-03-13 ASSESSMENT — PAIN SCALES - GENERAL
PAINLEVEL_OUTOF10: 6
PAINLEVEL_OUTOF10: 8
PAINLEVEL_OUTOF10: 10
PAINLEVEL_OUTOF10: 8
PAINLEVEL_OUTOF10: 10
PAINLEVEL_OUTOF10: 8
PAINLEVEL_OUTOF10: 0

## 2024-03-13 ASSESSMENT — PAIN DESCRIPTION - LOCATION
LOCATION: KNEE
LOCATION: LEG
LOCATION: LEG
LOCATION: KNEE

## 2024-03-13 ASSESSMENT — PAIN DESCRIPTION - DESCRIPTORS
DESCRIPTORS: ACHING;DISCOMFORT;TENDER
DESCRIPTORS: DISCOMFORT;ACHING
DESCRIPTORS: ACHING
DESCRIPTORS: ACHING

## 2024-03-13 ASSESSMENT — PAIN DESCRIPTION - ONSET: ONSET: ON-GOING

## 2024-03-13 ASSESSMENT — PAIN DESCRIPTION - PAIN TYPE
TYPE: SURGICAL PAIN
TYPE: SURGICAL PAIN

## 2024-03-13 ASSESSMENT — PAIN - FUNCTIONAL ASSESSMENT
PAIN_FUNCTIONAL_ASSESSMENT: PREVENTS OR INTERFERES SOME ACTIVE ACTIVITIES AND ADLS
PAIN_FUNCTIONAL_ASSESSMENT: PREVENTS OR INTERFERES SOME ACTIVE ACTIVITIES AND ADLS

## 2024-03-13 ASSESSMENT — PAIN DESCRIPTION - FREQUENCY: FREQUENCY: CONTINUOUS

## 2024-03-13 NOTE — ACP (ADVANCE CARE PLANNING)
Advance Care Planning     Advance Care Planning Inpatient Note  [facility] Department    Today's Date: 3/13/2024  Unit: [facility] 8AB    Received request from patient.  Upon review of chart and communication with care team, patient's decision making abilities are not in question.. Patient was/were present in the room during visit.    Goals of ACP Conversation:  Discuss advance care planning documents    Health Care Decision Makers:       Primary Decision Maker: Nura Estrada - Child - [phone]    Secondary Decision Maker: Ananda Alasele - Child - [phone]  Summary:  Completed New Documents  Updated Healthcare Decision Maker    Advance Care Planning Documents (Patient Wishes):  Healthcare Power of /Advance Directive Appointment of Health Care Agent  Living Will/Advance Directive     Assessment:   met with Kirstin to offer support for the updating / completion of Advance Directives documents as part of an Advance Care Planning conversation.  She was alert and oriented with decision-making capacity to express her wishes at this time.    Interventions:  Provided education on documents for clarity and greater understanding  Assisted in the completion of documents according to patient's wishes at this time  Encouraged ongoing ACP conversation with future decision makers and loved ones    Care Preferences Communicated:   No    Outcomes/Plan:  ACP Discussion: Completed  New advance directive completed.  Returned original document(s) to patient, as well as copies for distribution to appointed agents  Copy of advance directive given to staff to scan into medical record.    Electronically signed by Chaplain Rajat on 3/13/2024 at 3:14 PM

## 2024-03-13 NOTE — CARE COORDINATION
3/13/24, 3:02 PM EDT      DISCHARGE PLANNING EVALUATION    Kirstin Estrada  Admitted: 3/11/2024  Hospital Day: 1    Location: 8A-12/012-A Reason for admit: New onset atrial fibrillation (HCC) [I48.91]  Atrial fibrillation with rapid ventricular response (HCC) [I48.91]  Status post right knee replacement [Z96.651]    History reviewed. No pertinent past medical history.    Procedure: 3/11 CXR: 1. No consolidation.   2. Mild bronchial wall thickening.   Barriers to Discharge: Sent to ER from [facility] following L TKR with new Afib RVR. Cardiology consulted. IV Diltiazem.     PCP: Evelio Hennessy MD    Readmission Risk Low 0-14, Mod 15-19), High > 20: Readmission Risk Score: 10.2      Advance Directives:      Code Status: Full Code   Patient's Primary Decision Maker is: Legal Next of Kin      Patient Goals/Plan/Treatment Preferences: From home alone. SW assisting with placement as SNF. Looking at [facility].     Transportation/Food Security/Housekeeping Addressed: No issues identified.     If patient is discharged prior to next notation, then this note serves as note for discharge by case management.

## 2024-03-13 NOTE — PLAN OF CARE
Problem: Discharge Planning  Goal: Discharge to home or other facility with appropriate resources  3/13/2024 1126 by Tere Araujo LSW  Outcome: Progressing     Consult received. Please see SW note dated 3/13.

## 2024-03-13 NOTE — PLAN OF CARE
Problem: Discharge Planning  Goal: Discharge to home or other facility with appropriate resources  3/12/2024 2032 by Zeus Santiago RN  Outcome: Progressing  Flowsheets (Taken 3/12/2024 1915)  Discharge to home or other facility with appropriate resources: Identify barriers to discharge with patient and caregiver  3/12/2024 2032 by Zeus Santiago RN  Outcome: Progressing  Flowsheets (Taken 3/12/2024 1915)  Discharge to home or other facility with appropriate resources: Identify barriers to discharge with patient and caregiver  3/12/2024 1304 by Shahla Gerardo RN  Outcome: Progressing     Problem: ABCDS Injury Assessment  Goal: Absence of physical injury  3/12/2024 2032 by Zeus Santiago RN  Outcome: Progressing  3/12/2024 2032 by Zeus Santiago RN  Outcome: Progressing  3/12/2024 1304 by Shahla Gerardo RN  Outcome: Progressing     Problem: Safety - Adult  Goal: Free from fall injury  3/12/2024 2032 by Zeus Santiago RN  Outcome: Progressing  3/12/2024 2032 by Zeus Santiago RN  Outcome: Progressing     Problem: Pain  Goal: Verbalizes/displays adequate comfort level or baseline comfort level  3/12/2024 2032 by Zeus Santiago RN  Outcome: Progressing  3/12/2024 2032 by Zeus Santiago RN  Outcome: Progressing

## 2024-03-13 NOTE — FLOWSHEET NOTE
03/13/24 1857   Treatment Team Notification   Reason for Communication Review case   Treatment Team Role Attending Provider   Method of Communication Secure Message   Response No new orders     pt arrived to 3b BP 83/55 map 63, HR 58, I have turned down cardizem to 10mg and have her in trendelenberg. BP up a bit 93/60.     1852  also getting an EKS as pt has con

## 2024-03-13 NOTE — CARE COORDINATION
3/13/24, 11:43 AM EDT    DISCHARGE PLANNING EVALUATION    This SW did meet with patient and discussed placement at [facility]. Kayla states that she has already been in contact with them about going following her knee replacement on Monday.     Spoke with Galdino from facility, they are able to take on Friday afternoon once they have a discharge that morning.       Readmission Risk Low 0-14, Mod 15-19), High > 20: Readmission Risk Score: 11    Current PCP: Evelio Hennessy MD  PCP verified by CM? Yes    Patient Orientation: Alert and Oriented    Patient Cognition: Alert  History Provided by: Patient    Advance Directives:      Code Status: Full Code   Patient's Primary Decision Maker is: Legal Next of Kin       Discharge Planning:    Patient lives with: Alone Type of Home: House  Primary Care Giver: Self  Patient Support Systems include: Children   Current Financial resources: Medicare  Current community resources: None  Current services prior to admission: None            Current DME:              Type of Home Care services:  None    ADLS  Prior functional level: Independent in ADLs/IADLs  Current functional level: Assistance with the following:, Bathing, Dressing, Toileting, Housework, Mobility    Family can provide assistance at DC: No  Would you like Case Management to discuss the discharge plan with any other family members/significant others, and if so, who? No  Plans to Return to Present Housing: No  Other Identified Issues/Barriers to RETURNING to current housing: None  Potential Assistance needed at discharge: Skilled Nursing Facility            Potential DME:    Patient expects to discharge to: Skilled nursing facility  Plan for transportation at discharge: Family    Financial  Payor: MEDICARE / Plan: MEDICARE PART A AND B / Product Type: *No Product type* /     Potential assistance Purchasing Medications: No    Electronically signed by SINGH Fleming on 3/13/2024 at 11:44 AM

## 2024-03-14 ENCOUNTER — APPOINTMENT (OUTPATIENT)
Age: 79
DRG: 315 | End: 2024-03-14
Attending: INTERNAL MEDICINE
Payer: MEDICARE

## 2024-03-14 PROBLEM — Z96.652 S/P TOTAL KNEE ARTHROPLASTY, LEFT: Status: ACTIVE | Noted: 2024-03-14

## 2024-03-14 PROCEDURE — 99232 SBSQ HOSP IP/OBS MODERATE 35: CPT | Performed by: PHYSICIAN ASSISTANT

## 2024-03-14 PROCEDURE — 99232 SBSQ HOSP IP/OBS MODERATE 35: CPT | Performed by: REGISTERED NURSE

## 2024-03-14 PROCEDURE — 6370000000 HC RX 637 (ALT 250 FOR IP): Performed by: PHYSICIAN ASSISTANT

## 2024-03-14 PROCEDURE — 6370000000 HC RX 637 (ALT 250 FOR IP): Performed by: NURSE PRACTITIONER

## 2024-03-14 PROCEDURE — 97530 THERAPEUTIC ACTIVITIES: CPT

## 2024-03-14 PROCEDURE — 2140000000 HC CCU INTERMEDIATE R&B

## 2024-03-14 PROCEDURE — 6370000000 HC RX 637 (ALT 250 FOR IP): Performed by: REGISTERED NURSE

## 2024-03-14 PROCEDURE — 99222 1ST HOSP IP/OBS MODERATE 55: CPT | Performed by: STUDENT IN AN ORGANIZED HEALTH CARE EDUCATION/TRAINING PROGRAM

## 2024-03-14 PROCEDURE — 97116 GAIT TRAINING THERAPY: CPT

## 2024-03-14 PROCEDURE — 2580000003 HC RX 258: Performed by: NURSE PRACTITIONER

## 2024-03-14 PROCEDURE — 97110 THERAPEUTIC EXERCISES: CPT

## 2024-03-14 RX ORDER — METOPROLOL SUCCINATE 100 MG/1
100 TABLET, EXTENDED RELEASE ORAL 2 TIMES DAILY
Status: DISCONTINUED | OUTPATIENT
Start: 2024-03-14 | End: 2024-03-15

## 2024-03-14 RX ORDER — LANOLIN ALCOHOL/MO/W.PET/CERES
400 CREAM (GRAM) TOPICAL DAILY
Status: DISCONTINUED | OUTPATIENT
Start: 2024-03-14 | End: 2024-03-19 | Stop reason: HOSPADM

## 2024-03-14 RX ADMIN — HYDROCODONE BITARTRATE AND ACETAMINOPHEN 2 TABLET: 5; 325 TABLET ORAL at 20:49

## 2024-03-14 RX ADMIN — POTASSIUM BICARBONATE 50 MEQ: 782 TABLET, EFFERVESCENT ORAL at 10:10

## 2024-03-14 RX ADMIN — METOPROLOL SUCCINATE 100 MG: 50 TABLET, EXTENDED RELEASE ORAL at 10:08

## 2024-03-14 RX ADMIN — Medication 1 TABLET: at 10:07

## 2024-03-14 RX ADMIN — APIXABAN 5 MG: 5 TABLET, FILM COATED ORAL at 10:08

## 2024-03-14 RX ADMIN — APIXABAN 5 MG: 5 TABLET, FILM COATED ORAL at 20:51

## 2024-03-14 RX ADMIN — SODIUM CHLORIDE, PRESERVATIVE FREE 10 ML: 5 INJECTION INTRAVENOUS at 21:24

## 2024-03-14 RX ADMIN — Medication 400 MG: at 12:04

## 2024-03-14 RX ADMIN — HYDROCODONE BITARTRATE AND ACETAMINOPHEN 2 TABLET: 5; 325 TABLET ORAL at 03:59

## 2024-03-14 RX ADMIN — ASPIRIN 162 MG: 325 TABLET ORAL at 09:50

## 2024-03-14 RX ADMIN — METOPROLOL SUCCINATE 100 MG: 50 TABLET, EXTENDED RELEASE ORAL at 20:51

## 2024-03-14 RX ADMIN — HYDROCODONE BITARTRATE AND ACETAMINOPHEN 1 TABLET: 5; 325 TABLET ORAL at 09:51

## 2024-03-14 RX ADMIN — HYDROCODONE BITARTRATE AND ACETAMINOPHEN 1 TABLET: 5; 325 TABLET ORAL at 14:34

## 2024-03-14 ASSESSMENT — PAIN DESCRIPTION - PAIN TYPE
TYPE: SURGICAL PAIN

## 2024-03-14 ASSESSMENT — PAIN DESCRIPTION - DESCRIPTORS
DESCRIPTORS: ACHING
DESCRIPTORS: ACHING;DISCOMFORT
DESCRIPTORS: ACHING
DESCRIPTORS: ACHING

## 2024-03-14 ASSESSMENT — PAIN SCALES - GENERAL
PAINLEVEL_OUTOF10: 5
PAINLEVEL_OUTOF10: 0
PAINLEVEL_OUTOF10: 4
PAINLEVEL_OUTOF10: 7

## 2024-03-14 ASSESSMENT — PAIN DESCRIPTION - ONSET
ONSET: ON-GOING

## 2024-03-14 ASSESSMENT — PAIN - FUNCTIONAL ASSESSMENT
PAIN_FUNCTIONAL_ASSESSMENT: PREVENTS OR INTERFERES SOME ACTIVE ACTIVITIES AND ADLS
PAIN_FUNCTIONAL_ASSESSMENT: PREVENTS OR INTERFERES SOME ACTIVE ACTIVITIES AND ADLS
PAIN_FUNCTIONAL_ASSESSMENT: PREVENTS OR INTERFERES WITH MANY ACTIVE NOT PASSIVE ACTIVITIES
PAIN_FUNCTIONAL_ASSESSMENT: PREVENTS OR INTERFERES SOME ACTIVE ACTIVITIES AND ADLS

## 2024-03-14 ASSESSMENT — PAIN DESCRIPTION - ORIENTATION
ORIENTATION: LEFT

## 2024-03-14 ASSESSMENT — PAIN DESCRIPTION - FREQUENCY
FREQUENCY: CONTINUOUS

## 2024-03-14 ASSESSMENT — PAIN DESCRIPTION - LOCATION
LOCATION: KNEE
LOCATION: KNEE;LEG

## 2024-03-14 NOTE — CONSULTS
[place] [id number]                              CONSULTATION      PATIENT NAME: JUSTYNA LUNA              : 1945  MED REC NO: [id number]                       ROOM: 14  ACCOUNT NO: [id number]                       ADMIT DATE: 2024  PROVIDER: VIVIAN Gold      HISTORY OF PRESENT ILLNESS:  The patient is a pleasant 78-year-old female, who was a known patient of ours.  She had undergone a left total knee replacement on 2024.  The patient from an orthopedic standpoint was uncomplicated, but after the completion of the procedure, the patient had gone into new onset atrial fibrillation with tachycardia as well as hypotension.  The patient was transferred then to [facility] for further care.  The patient upon arrival to [facility] Emergency Department had converted back to normal sinus rhythm.  As I discussed with her earlier this morning, she really has no symptoms currently.  She denies chest pain, shortness of breath, nausea, and vomiting.  She states that she does have some mild left knee pain, otherwise, seems to be doing well.    PHYSICAL EXAM:  GENERAL:  She is a 78-year-old female.  Well developed, well nourished, appears in no acute distress.  Alert and oriented x3.  EXTREMITIES:  Left knee:  Inspection of her left knee shows the presence of a surgical dressing, but it is clean, dry, and intact.  Calf is soft and nontender.  She maintains active dorsiflexion and plantar flexion with adequate strength.  Sensation maintained throughout left lower extremity.  Compartments are soft, she is neurovascularly intact.    ASSESSMENT:  Postop day 1, status post left total knee replacement.    PLAN:  At this time, we are going to start direct oral anticoagulation.  I will leave the specific anticoagulant up to the internist or cardiologist.  She may weightbear as tolerated on this left lower extremity.  
    [facility]'s  Consult    Patient's Name/Date of Birth: Kirstin Estrada / 1945 (78 y.o.)    Date: March 12, 2024     Referring Provider: Mayuri Santacruz PA-C    CHIEF COMPLAINT: new onset atrial fibrillation from O    PMH: HTN, HLD, hyponatremia    HPI: This is a pleasant 78 y.o. female presents with new onset atrial fibrillation w/ RVR and hypotension during left total knee replacement w/ Dr. Beaulieu at [facility] 3/11/24. HR was noted to be 120-140s w/ SBP . Pt was given IVP lopressor and IV fluids post procedure.    Patient denies fever, chills, chest pain, shortness of breath, Isaac pain, nausea, vomiting, diarrhea, dysuria, hematuria, constipation, diarrhea.  Patient did state there was trace blood in her urine last month and was given ciprofloxacin by her primary care doctor for suspected UTI at that time.    Pt was atrial fibrillation w/ RVR on arrival to ED w/ . Pt received liter fluid bolus and self converted to NSR w/ HR 96.     BMP shows mild hyponatremia (chronic) and mild NAGMA. BNP 1012. Troponin 12. HFP shows elevated ALT and AST. TSH WNL. Etoh negative. CBC shows mild leukocytosis at 13.8. UDS positive for benzos and opiates. UA shows trace blood but not bacteria and negative nitrites/leukocytes.         Echo: No results found for this or any previous visit.  Heart catheterizations:  No results found for this or any previous visit.       All labs, EKG's, diagnostic testing and images as well as cardiac cath, stress testing were reviewed during this encounter    History reviewed. No pertinent past medical history.  Past Surgical History:   Procedure Laterality Date    HYSTERECTOMY (CERVIX STATUS UNKNOWN)  1995    OVARY REMOVAL  1995     Current Facility-Administered Medications   Medication Dose Route Frequency Provider Last Rate Last Admin    aspirin tablet 162 mg  162 mg Oral Daily Valentine Pickett APRN - SAVI        multivitamin 1 tablet  1 
Ortho Consult #[id number]  Pt POD 1 L TKA. New onset A Fib after procedure which has now converted.  Plan: WBAT. Start PT. Dressing change POD 2. Pain control. OK to start DOAC now. Will leave specific med up to Hospitalist/Cardiology. Scheduled f/u Dr. Salazar 2 wks.   
overpressure with heel slides to increase flexion ROM.  Ankle pumps, Quad sets, Heelslides, Hip abduction/adduction, and Straight leg raises.  Exercises were completed for increased independence with functional mobility.        Past Medical History:    History reviewed. No pertinent past medical history.    Past Surgical History:        Procedure Laterality Date    HYSTERECTOMY (CERVIX STATUS UNKNOWN)  1995    OVARY REMOVAL  1995       Allergies:    Allergies   Allergen Reactions    Sulfa Antibiotics Anaphylaxis        Current Medications:   Current Facility-Administered Medications: metoprolol succinate (TOPROL XL) extended release tablet 100 mg, 100 mg, Oral, BID  magnesium oxide (MAG-OX) tablet 400 mg, 400 mg, Oral, Daily  apixaban (ELIQUIS) tablet 5 mg, 5 mg, Oral, BID  HYDROcodone-acetaminophen (NORCO) 5-325 MG per tablet 1 tablet, 1 tablet, Oral, Q4H PRN **OR** HYDROcodone-acetaminophen (NORCO) 5-325 MG per tablet 2 tablet, 2 tablet, Oral, Q4H PRN  aspirin tablet 162 mg, 162 mg, Oral, Daily  multivitamin 1 tablet, 1 tablet, Oral, Daily  sodium chloride flush 0.9 % injection 5-40 mL, 5-40 mL, IntraVENous, 2 times per day  sodium chloride flush 0.9 % injection 5-40 mL, 5-40 mL, IntraVENous, PRN  0.9 % sodium chloride infusion, , IntraVENous, PRN  ondansetron (ZOFRAN-ODT) disintegrating tablet 4 mg, 4 mg, Oral, Q8H PRN **OR** ondansetron (ZOFRAN) injection 4 mg, 4 mg, IntraVENous, Q6H PRN  polyethylene glycol (GLYCOLAX) packet 17 g, 17 g, Oral, Daily PRN  acetaminophen (TYLENOL) tablet 650 mg, 650 mg, Oral, Q6H PRN **OR** acetaminophen (TYLENOL) suppository 650 mg, 650 mg, Rectal, Q6H PRN  potassium chloride (KLOR-CON M) extended release tablet 40 mEq, 40 mEq, Oral, PRN **OR** potassium bicarb-citric acid (EFFER-K) effervescent tablet 40 mEq, 40 mEq, Oral, PRN **OR** potassium chloride 10 mEq/100 mL IVPB (Peripheral Line), 10 mEq, IntraVENous, PRN  magnesium sulfate 2000 mg in 50 mL IVPB premix, 2,000 mg,

## 2024-03-14 NOTE — CARE COORDINATION
3/14/24, 1:51 PM EDT    DISCHARGE PLANNING EVALUATION    Spoke with patient this morning.  She would like to go to [facility]. An [facility] consult has been placed.  Will continue to plan on her going to [facility] unless she is accepted to [facility].

## 2024-03-14 NOTE — DISCHARGE INSTR - COC
MANAGEMENT/SOCIAL WORK SECTION    Inpatient Status Date: 3/12/2024    Readmission Risk Assessment Score:  Readmission Risk              Risk of Unplanned Readmission:  12           Discharging to Facility/ Agency   Name: [facility]  Address:[address] [place], [place] [address]  Phone:[phone]  Fax:[phone]    Dialysis Facility (if applicable)   Name:  Address:  Dialysis Schedule:  Phone:  Fax:    / signature: Electronically signed by SINGH Olivas on 3/14/24 at 8:13 AM EDT    PHYSICIAN SECTION    Prognosis: {Prognosis:9880300641}    Condition at Discharge: { Patient Condition:714124924}    Rehab Potential (if transferring to Rehab): {Prognosis:6480017259}    Recommended Labs or Other Treatments After Discharge: ***    Physician Certification: I certify the above information and transfer of Kirstin Estrada  is necessary for the continuing treatment of the diagnosis listed and that she requires {Admit to Appropriate Level of Care:10430} for {GREATER/LESS:030098282} 30 days.     Update Admission H&P: {CHP DME Changes in HandP:397080713}    PHYSICIAN SIGNATURE:  {Esignature:[id number]}

## 2024-03-14 NOTE — DISCHARGE INSTRUCTIONS
Wear the heart monitor for the next 30 days to evaluate for recurrences of your atrial fibrillation     Follow up with Dr. Romero in 5-6 weeks

## 2024-03-14 NOTE — CARE COORDINATION
3/14/24, 12:25 PM EDT    DISCHARGE ON GOING EVALUATION    Kirstin Estrada       [facility] day: 2  Location: [address] [place] [address]- Reason for admit: New onset atrial fibrillation (HCC) [I48.91]  Atrial fibrillation with rapid ventricular response (HCC) [I48.91]  Status post right knee replacement [Z96.651]   Procedure:   3/11 CXR: No consolidation. Mild bronchial wall thickening.   3/12 Echo: EF 55-60%.     Barriers to Discharge: Transferred from  due to afib with RVR. Cardizem gtt. Converted to NSR. Sodium 129. Pain control. PT/OT. Pt is now asking for IPR. PM&R and admissions for IPR consulted.     PCP: Evelio Hennessy MD  Readmission Risk Score: 10.2%  Patient Goals/Plan/Treatment Preferences: From home alone. Asking for IPR. Back up plan [facility]. SW following. Phone call to Richard regarding referral.

## 2024-03-14 NOTE — CARE COORDINATION
3/14/24, 7:14 AM EDT    DISCHARGE BARRIERS        Patient transferred to . Report given to unit SW, Sarah, regarding discharge plan for this patient.

## 2024-03-15 ENCOUNTER — APPOINTMENT (OUTPATIENT)
Age: 79
DRG: 315 | End: 2024-03-15
Attending: INTERNAL MEDICINE
Payer: MEDICARE

## 2024-03-15 LAB
ANION GAP SERPL CALC-SCNC: 12 MEQ/L (ref 8–16)
BUN SERPL-MCNC: 12 MG/DL (ref 7–22)
CALCIUM SERPL-MCNC: 9.2 MG/DL (ref 8.5–10.5)
CHLORIDE SERPL-SCNC: 96 MEQ/L (ref 98–111)
CO2 SERPL-SCNC: 27 MEQ/L (ref 23–33)
CREAT SERPL-MCNC: 0.6 MG/DL (ref 0.4–1.2)
EKG ATRIAL RATE: 377 BPM
EKG ATRIAL RATE: 377 BPM
EKG Q-T INTERVAL: 304 MS
EKG Q-T INTERVAL: 304 MS
EKG QRS DURATION: 68 MS
EKG QRS DURATION: 68 MS
EKG QTC CALCULATION (BAZETT): 456 MS
EKG QTC CALCULATION (BAZETT): 456 MS
EKG R AXIS: 67 DEGREES
EKG R AXIS: 67 DEGREES
EKG T AXIS: -23 DEGREES
EKG T AXIS: -23 DEGREES
EKG VENTRICULAR RATE: 135 BPM
EKG VENTRICULAR RATE: 135 BPM
GFR SERPL CREATININE-BSD FRML MDRD: > 60 ML/MIN/1.73M2
GLUCOSE SERPL-MCNC: 99 MG/DL (ref 70–108)
MAGNESIUM SERPL-MCNC: 1.9 MG/DL (ref 1.6–2.4)
POTASSIUM SERPL-SCNC: 4.9 MEQ/L (ref 3.5–5.2)
SODIUM SERPL-SCNC: 135 MEQ/L (ref 135–145)

## 2024-03-15 PROCEDURE — 6370000000 HC RX 637 (ALT 250 FOR IP): Performed by: NURSE PRACTITIONER

## 2024-03-15 PROCEDURE — 99232 SBSQ HOSP IP/OBS MODERATE 35: CPT | Performed by: NURSE PRACTITIONER

## 2024-03-15 PROCEDURE — 6370000000 HC RX 637 (ALT 250 FOR IP): Performed by: REGISTERED NURSE

## 2024-03-15 PROCEDURE — 2500000003 HC RX 250 WO HCPCS: Performed by: PHYSICIAN ASSISTANT

## 2024-03-15 PROCEDURE — 99232 SBSQ HOSP IP/OBS MODERATE 35: CPT | Performed by: STUDENT IN AN ORGANIZED HEALTH CARE EDUCATION/TRAINING PROGRAM

## 2024-03-15 PROCEDURE — 97166 OT EVAL MOD COMPLEX 45 MIN: CPT

## 2024-03-15 PROCEDURE — 93005 ELECTROCARDIOGRAM TRACING: CPT

## 2024-03-15 PROCEDURE — 97535 SELF CARE MNGMENT TRAINING: CPT

## 2024-03-15 PROCEDURE — 97116 GAIT TRAINING THERAPY: CPT

## 2024-03-15 PROCEDURE — 6370000000 HC RX 637 (ALT 250 FOR IP): Performed by: PHYSICIAN ASSISTANT

## 2024-03-15 PROCEDURE — 80048 BASIC METABOLIC PNL TOTAL CA: CPT

## 2024-03-15 PROCEDURE — 93010 ELECTROCARDIOGRAM REPORT: CPT | Performed by: INTERNAL MEDICINE

## 2024-03-15 PROCEDURE — 99232 SBSQ HOSP IP/OBS MODERATE 35: CPT | Performed by: PHYSICIAN ASSISTANT

## 2024-03-15 PROCEDURE — 83735 ASSAY OF MAGNESIUM: CPT

## 2024-03-15 PROCEDURE — 36415 COLL VENOUS BLD VENIPUNCTURE: CPT

## 2024-03-15 PROCEDURE — 97530 THERAPEUTIC ACTIVITIES: CPT

## 2024-03-15 PROCEDURE — 2580000003 HC RX 258: Performed by: NURSE PRACTITIONER

## 2024-03-15 PROCEDURE — 93005 ELECTROCARDIOGRAM TRACING: CPT | Performed by: INTERNAL MEDICINE

## 2024-03-15 PROCEDURE — 97110 THERAPEUTIC EXERCISES: CPT

## 2024-03-15 PROCEDURE — 2140000000 HC CCU INTERMEDIATE R&B

## 2024-03-15 PROCEDURE — 2580000003 HC RX 258: Performed by: PHYSICIAN ASSISTANT

## 2024-03-15 RX ORDER — FLECAINIDE ACETATE 50 MG/1
50 TABLET ORAL 2 TIMES DAILY
Status: DISCONTINUED | OUTPATIENT
Start: 2024-03-15 | End: 2024-03-17

## 2024-03-15 RX ORDER — METOPROLOL SUCCINATE 25 MG/1
25 TABLET, EXTENDED RELEASE ORAL DAILY
Status: DISCONTINUED | OUTPATIENT
Start: 2024-03-15 | End: 2024-03-19 | Stop reason: HOSPADM

## 2024-03-15 RX ORDER — DILTIAZEM HYDROCHLORIDE 120 MG/1
120 CAPSULE, COATED, EXTENDED RELEASE ORAL DAILY
Status: DISCONTINUED | OUTPATIENT
Start: 2024-03-15 | End: 2024-03-15

## 2024-03-15 RX ORDER — DILTIAZEM HYDROCHLORIDE 5 MG/ML
10 INJECTION INTRAVENOUS ONCE
Status: COMPLETED | OUTPATIENT
Start: 2024-03-15 | End: 2024-03-15

## 2024-03-15 RX ADMIN — DILTIAZEM HYDROCHLORIDE 10 MG: 5 INJECTION, SOLUTION INTRAVENOUS at 06:44

## 2024-03-15 RX ADMIN — ASPIRIN 162 MG: 325 TABLET ORAL at 09:05

## 2024-03-15 RX ADMIN — HYDROCODONE BITARTRATE AND ACETAMINOPHEN 1 TABLET: 5; 325 TABLET ORAL at 14:31

## 2024-03-15 RX ADMIN — HYDROCODONE BITARTRATE AND ACETAMINOPHEN 1 TABLET: 5; 325 TABLET ORAL at 21:31

## 2024-03-15 RX ADMIN — FLECAINIDE ACETATE 50 MG: 50 TABLET ORAL at 21:32

## 2024-03-15 RX ADMIN — APIXABAN 5 MG: 5 TABLET, FILM COATED ORAL at 09:05

## 2024-03-15 RX ADMIN — SODIUM CHLORIDE, PRESERVATIVE FREE 10 ML: 5 INJECTION INTRAVENOUS at 09:06

## 2024-03-15 RX ADMIN — Medication 400 MG: at 09:05

## 2024-03-15 RX ADMIN — HYDROCODONE BITARTRATE AND ACETAMINOPHEN 1 TABLET: 5; 325 TABLET ORAL at 08:54

## 2024-03-15 RX ADMIN — METOPROLOL SUCCINATE 25 MG: 25 TABLET, EXTENDED RELEASE ORAL at 12:39

## 2024-03-15 RX ADMIN — FLECAINIDE ACETATE 50 MG: 50 TABLET ORAL at 12:39

## 2024-03-15 RX ADMIN — SODIUM CHLORIDE, PRESERVATIVE FREE 10 ML: 5 INJECTION INTRAVENOUS at 21:32

## 2024-03-15 RX ADMIN — Medication 1 TABLET: at 09:05

## 2024-03-15 RX ADMIN — DILTIAZEM HYDROCHLORIDE 5 MG/HR: 5 INJECTION INTRAVENOUS at 06:50

## 2024-03-15 RX ADMIN — APIXABAN 5 MG: 5 TABLET, FILM COATED ORAL at 21:32

## 2024-03-15 ASSESSMENT — PAIN DESCRIPTION - LOCATION
LOCATION: KNEE

## 2024-03-15 ASSESSMENT — PAIN DESCRIPTION - ONSET: ONSET: ON-GOING

## 2024-03-15 ASSESSMENT — PAIN SCALES - GENERAL
PAINLEVEL_OUTOF10: 10
PAINLEVEL_OUTOF10: 8
PAINLEVEL_OUTOF10: 2
PAINLEVEL_OUTOF10: 10
PAINLEVEL_OUTOF10: 0

## 2024-03-15 ASSESSMENT — PAIN DESCRIPTION - FREQUENCY: FREQUENCY: CONTINUOUS

## 2024-03-15 ASSESSMENT — PAIN DESCRIPTION - DESCRIPTORS
DESCRIPTORS: ACHING;DISCOMFORT

## 2024-03-15 ASSESSMENT — PAIN DESCRIPTION - PAIN TYPE: TYPE: SURGICAL PAIN

## 2024-03-15 ASSESSMENT — PAIN DESCRIPTION - ORIENTATION
ORIENTATION: LEFT
ORIENTATION: LEFT

## 2024-03-15 ASSESSMENT — PAIN SCALES - WONG BAKER: WONGBAKER_NUMERICALRESPONSE: NO HURT

## 2024-03-15 ASSESSMENT — PAIN - FUNCTIONAL ASSESSMENT: PAIN_FUNCTIONAL_ASSESSMENT: PREVENTS OR INTERFERES SOME ACTIVE ACTIVITIES AND ADLS

## 2024-03-15 NOTE — CARE COORDINATION
3/15/24, 1:30 PM EDT    DISCHARGE ON GOING EVALUATION    Kirstin Estrada       [facility] day: 3  Location: [address] [place] [address]- Reason for admit: New onset atrial fibrillation (HCC) [I48.91]  Atrial fibrillation with rapid ventricular response (HCC) [I48.91]  Status post right knee replacement [Z96.651]   Procedure:   3/11 CXR: No consolidation. Mild bronchial wall thickening.   3/12 Echo: EF 55-60%.     Barriers to Discharge: Hospitalist, Ortho and Cardiology following. PM&R following for IPR. PT/OT. Pt spontaneously converted to NSR today so ROSSI/CV cancelled. IPR wants to wait until 3/18 at the earliest to take pt. Cardiology ok with transfer to IPR. Added Flecainide bid and changed Toprol dose.     PCP: Evelio Hennessy MD  Readmission Risk Score: 7.2%  Patient Goals/Plan/Treatment Preferences: From home alone with family support. Planning IPR at discharge.

## 2024-03-15 NOTE — CARE COORDINATION
3/15/24, 3:06 PM EDT    DISCHARGE PLANNING EVALUATION    Spoke with Galdino at [facility].  Made him aware he can let go of Kayla's bed as she is going to Inpatient Rehab.

## 2024-03-15 NOTE — PLAN OF CARE
Problem: ABCDS Injury Assessment  Goal: Absence of physical injury  Outcome: Progressing  Note: Pt has remained free from physical injury     Problem: Safety - Adult  Goal: Free from fall injury  Outcome: Progressing  Note: Pt has remained free from fall injury     Problem: Pain  Goal: Verbalizes/displays adequate comfort level or baseline comfort level  Outcome: Progressing  Note: Patient able to verbalize pain level and if interventions are needed

## 2024-03-16 LAB
ANION GAP SERPL CALC-SCNC: 10 MEQ/L (ref 8–16)
BASOPHILS ABSOLUTE: 0 THOU/MM3 (ref 0–0.1)
BASOPHILS NFR BLD AUTO: 0.5 %
BUN SERPL-MCNC: 17 MG/DL (ref 7–22)
CALCIUM SERPL-MCNC: 9 MG/DL (ref 8.5–10.5)
CHLORIDE SERPL-SCNC: 96 MEQ/L (ref 98–111)
CO2 SERPL-SCNC: 26 MEQ/L (ref 23–33)
CREAT SERPL-MCNC: 0.6 MG/DL (ref 0.4–1.2)
DEPRECATED RDW RBC AUTO: 44.5 FL (ref 35–45)
EOSINOPHIL NFR BLD AUTO: 3.9 %
EOSINOPHILS ABSOLUTE: 0.4 THOU/MM3 (ref 0–0.4)
ERYTHROCYTE [DISTWIDTH] IN BLOOD BY AUTOMATED COUNT: 12.4 % (ref 11.5–14.5)
GFR SERPL CREATININE-BSD FRML MDRD: > 60 ML/MIN/1.73M2
GLUCOSE SERPL-MCNC: 101 MG/DL (ref 70–108)
HCT VFR BLD AUTO: 32 % (ref 37–47)
HGB BLD-MCNC: 10.2 GM/DL (ref 12–16)
IMM GRANULOCYTES # BLD AUTO: 0.03 THOU/MM3 (ref 0–0.07)
IMM GRANULOCYTES NFR BLD AUTO: 0.3 %
LYMPHOCYTES ABSOLUTE: 1.9 THOU/MM3 (ref 1–4.8)
LYMPHOCYTES NFR BLD AUTO: 20.5 %
MCH RBC QN AUTO: 31.2 PG (ref 26–33)
MCHC RBC AUTO-ENTMCNC: 31.9 GM/DL (ref 32.2–35.5)
MCV RBC AUTO: 97.9 FL (ref 81–99)
MONOCYTES ABSOLUTE: 0.9 THOU/MM3 (ref 0.4–1.3)
MONOCYTES NFR BLD AUTO: 9.7 %
NEUTROPHILS NFR BLD AUTO: 65.1 %
NRBC BLD AUTO-RTO: 0 /100 WBC
PLATELET # BLD AUTO: 254 THOU/MM3 (ref 130–400)
PMV BLD AUTO: 8.8 FL (ref 9.4–12.4)
POTASSIUM SERPL-SCNC: 5 MEQ/L (ref 3.5–5.2)
RBC # BLD AUTO: 3.27 MILL/MM3 (ref 4.2–5.4)
SEGMENTED NEUTROPHILS ABSOLUTE COUNT: 6 THOU/MM3 (ref 1.8–7.7)
SODIUM SERPL-SCNC: 132 MEQ/L (ref 135–145)
WBC # BLD AUTO: 9.2 THOU/MM3 (ref 4.8–10.8)

## 2024-03-16 PROCEDURE — 80048 BASIC METABOLIC PNL TOTAL CA: CPT

## 2024-03-16 PROCEDURE — 6370000000 HC RX 637 (ALT 250 FOR IP): Performed by: NURSE PRACTITIONER

## 2024-03-16 PROCEDURE — 85025 COMPLETE CBC W/AUTO DIFF WBC: CPT

## 2024-03-16 PROCEDURE — 2500000003 HC RX 250 WO HCPCS: Performed by: INTERNAL MEDICINE

## 2024-03-16 PROCEDURE — 2140000000 HC CCU INTERMEDIATE R&B

## 2024-03-16 PROCEDURE — 2580000003 HC RX 258: Performed by: INTERNAL MEDICINE

## 2024-03-16 PROCEDURE — 6370000000 HC RX 637 (ALT 250 FOR IP): Performed by: PHYSICIAN ASSISTANT

## 2024-03-16 PROCEDURE — 2500000003 HC RX 250 WO HCPCS: Performed by: PHYSICIAN ASSISTANT

## 2024-03-16 PROCEDURE — 36415 COLL VENOUS BLD VENIPUNCTURE: CPT

## 2024-03-16 PROCEDURE — 99231 SBSQ HOSP IP/OBS SF/LOW 25: CPT | Performed by: PHYSICIAN ASSISTANT

## 2024-03-16 PROCEDURE — 93005 ELECTROCARDIOGRAM TRACING: CPT

## 2024-03-16 PROCEDURE — 2580000003 HC RX 258: Performed by: NURSE PRACTITIONER

## 2024-03-16 PROCEDURE — 97110 THERAPEUTIC EXERCISES: CPT

## 2024-03-16 PROCEDURE — 97535 SELF CARE MNGMENT TRAINING: CPT

## 2024-03-16 PROCEDURE — 6370000000 HC RX 637 (ALT 250 FOR IP): Performed by: REGISTERED NURSE

## 2024-03-16 RX ORDER — LANOLIN ALCOHOL/MO/W.PET/CERES
3 CREAM (GRAM) TOPICAL NIGHTLY PRN
Status: DISCONTINUED | OUTPATIENT
Start: 2024-03-16 | End: 2024-03-19 | Stop reason: HOSPADM

## 2024-03-16 RX ORDER — METOPROLOL TARTRATE 1 MG/ML
5 INJECTION, SOLUTION INTRAVENOUS ONCE
Status: COMPLETED | OUTPATIENT
Start: 2024-03-16 | End: 2024-03-16

## 2024-03-16 RX ADMIN — METOPROLOL TARTRATE 5 MG: 5 INJECTION INTRAVENOUS at 18:43

## 2024-03-16 RX ADMIN — APIXABAN 5 MG: 5 TABLET, FILM COATED ORAL at 08:34

## 2024-03-16 RX ADMIN — HYDROCODONE BITARTRATE AND ACETAMINOPHEN 1 TABLET: 5; 325 TABLET ORAL at 13:34

## 2024-03-16 RX ADMIN — APIXABAN 5 MG: 5 TABLET, FILM COATED ORAL at 20:32

## 2024-03-16 RX ADMIN — SODIUM CHLORIDE, PRESERVATIVE FREE 10 ML: 5 INJECTION INTRAVENOUS at 08:34

## 2024-03-16 RX ADMIN — ACETAMINOPHEN 650 MG: 325 TABLET ORAL at 20:32

## 2024-03-16 RX ADMIN — FLECAINIDE ACETATE 50 MG: 50 TABLET ORAL at 20:43

## 2024-03-16 RX ADMIN — Medication 400 MG: at 08:33

## 2024-03-16 RX ADMIN — DILTIAZEM HYDROCHLORIDE 10 MG/HR: 5 INJECTION INTRAVENOUS at 19:53

## 2024-03-16 RX ADMIN — FLECAINIDE ACETATE 50 MG: 50 TABLET ORAL at 08:34

## 2024-03-16 RX ADMIN — Medication 1 TABLET: at 08:33

## 2024-03-16 RX ADMIN — METOPROLOL SUCCINATE 25 MG: 25 TABLET, EXTENDED RELEASE ORAL at 08:34

## 2024-03-16 ASSESSMENT — PAIN DESCRIPTION - LOCATION: LOCATION: KNEE

## 2024-03-16 ASSESSMENT — PAIN SCALES - GENERAL
PAINLEVEL_OUTOF10: 0
PAINLEVEL_OUTOF10: 3
PAINLEVEL_OUTOF10: 8

## 2024-03-16 ASSESSMENT — PAIN DESCRIPTION - ORIENTATION
ORIENTATION: LEFT
ORIENTATION: LOWER

## 2024-03-16 ASSESSMENT — PAIN DESCRIPTION - DESCRIPTORS
DESCRIPTORS: ACHING
DESCRIPTORS: DISCOMFORT

## 2024-03-16 NOTE — PLAN OF CARE
Problem: Discharge Planning  Goal: Discharge to home or other facility with appropriate resources  Outcome: Progressing  Flowsheets (Taken 3/12/2024 1915 by Zeus Santiago, RN)  Discharge to home or other facility with appropriate resources: Identify barriers to discharge with patient and caregiver     Problem: ABCDS Injury Assessment  Goal: Absence of physical injury  Outcome: Progressing  Flowsheets (Taken 3/16/2024 0429)  Absence of Physical Injury: Implement safety measures based on patient assessment     Problem: Safety - Adult  Goal: Free from fall injury  Outcome: Progressing  Flowsheets (Taken 3/16/2024 0429)  Free From Fall Injury:   Instruct family/caregiver on patient safety   Based on caregiver fall risk screen, instruct family/caregiver to ask for assistance with transferring infant if caregiver noted to have fall risk factors     Problem: Pain  Goal: Verbalizes/displays adequate comfort level or baseline comfort level  Outcome: Progressing  Flowsheets (Taken 3/16/2024 0429)  Verbalizes/displays adequate comfort level or baseline comfort level:   Encourage patient to monitor pain and request assistance   Assess pain using appropriate pain scale   Administer analgesics based on type and severity of pain and evaluate response   Implement non-pharmacological measures as appropriate and evaluate response   Consider cultural and social influences on pain and pain management   Notify Licensed Independent Practitioner if interventions unsuccessful or patient reports new pain       Care plan reviewed with patient.  Patient verbalizes understanding of the care plan and contributed to goal setting.

## 2024-03-17 LAB
BACTERIA BLD AEROBE CULT: NORMAL
BACTERIA BLD AEROBE CULT: NORMAL

## 2024-03-17 PROCEDURE — 97110 THERAPEUTIC EXERCISES: CPT

## 2024-03-17 PROCEDURE — 6370000000 HC RX 637 (ALT 250 FOR IP): Performed by: PHYSICIAN ASSISTANT

## 2024-03-17 PROCEDURE — 6370000000 HC RX 637 (ALT 250 FOR IP)

## 2024-03-17 PROCEDURE — 2580000003 HC RX 258: Performed by: INTERNAL MEDICINE

## 2024-03-17 PROCEDURE — 2140000000 HC CCU INTERMEDIATE R&B

## 2024-03-17 PROCEDURE — 97116 GAIT TRAINING THERAPY: CPT

## 2024-03-17 PROCEDURE — 99232 SBSQ HOSP IP/OBS MODERATE 35: CPT | Performed by: PHYSICIAN ASSISTANT

## 2024-03-17 PROCEDURE — 6370000000 HC RX 637 (ALT 250 FOR IP): Performed by: NURSE PRACTITIONER

## 2024-03-17 PROCEDURE — 2580000003 HC RX 258: Performed by: NURSE PRACTITIONER

## 2024-03-17 PROCEDURE — 2500000003 HC RX 250 WO HCPCS: Performed by: INTERNAL MEDICINE

## 2024-03-17 PROCEDURE — 2500000003 HC RX 250 WO HCPCS: Performed by: NURSE PRACTITIONER

## 2024-03-17 PROCEDURE — 6370000000 HC RX 637 (ALT 250 FOR IP): Performed by: REGISTERED NURSE

## 2024-03-17 RX ADMIN — APIXABAN 5 MG: 5 TABLET, FILM COATED ORAL at 20:13

## 2024-03-17 RX ADMIN — DILTIAZEM HYDROCHLORIDE 10 MG/HR: 5 INJECTION INTRAVENOUS at 07:15

## 2024-03-17 RX ADMIN — Medication 3 MG: at 00:10

## 2024-03-17 RX ADMIN — FLECAINIDE ACETATE 50 MG: 50 TABLET ORAL at 09:05

## 2024-03-17 RX ADMIN — ACETAMINOPHEN 650 MG: 325 TABLET ORAL at 20:13

## 2024-03-17 RX ADMIN — AMIODARONE HYDROCHLORIDE 0.5 MG/MIN: 1.8 INJECTION, SOLUTION INTRAVENOUS at 18:35

## 2024-03-17 RX ADMIN — ACETAMINOPHEN 650 MG: 325 TABLET ORAL at 09:05

## 2024-03-17 RX ADMIN — APIXABAN 5 MG: 5 TABLET, FILM COATED ORAL at 09:05

## 2024-03-17 RX ADMIN — SODIUM CHLORIDE, PRESERVATIVE FREE 10 ML: 5 INJECTION INTRAVENOUS at 20:13

## 2024-03-17 RX ADMIN — AMIODARONE HYDROCHLORIDE 150 MG: 1.5 INJECTION, SOLUTION INTRAVENOUS at 11:59

## 2024-03-17 RX ADMIN — Medication 400 MG: at 09:05

## 2024-03-17 RX ADMIN — Medication 1 TABLET: at 09:05

## 2024-03-17 RX ADMIN — AMIODARONE HYDROCHLORIDE 1 MG/MIN: 1.8 INJECTION, SOLUTION INTRAVENOUS at 12:11

## 2024-03-17 RX ADMIN — Medication 3 MG: at 20:13

## 2024-03-17 RX ADMIN — METOPROLOL SUCCINATE 25 MG: 25 TABLET, EXTENDED RELEASE ORAL at 09:05

## 2024-03-17 ASSESSMENT — PAIN DESCRIPTION - DESCRIPTORS: DESCRIPTORS: ACHING

## 2024-03-17 ASSESSMENT — PAIN SCALES - GENERAL
PAINLEVEL_OUTOF10: 4
PAINLEVEL_OUTOF10: 7

## 2024-03-17 ASSESSMENT — PAIN DESCRIPTION - LOCATION: LOCATION: KNEE

## 2024-03-17 ASSESSMENT — PAIN - FUNCTIONAL ASSESSMENT: PAIN_FUNCTIONAL_ASSESSMENT: PREVENTS OR INTERFERES SOME ACTIVE ACTIVITIES AND ADLS

## 2024-03-17 ASSESSMENT — PAIN DESCRIPTION - ORIENTATION: ORIENTATION: LEFT

## 2024-03-18 LAB
ANION GAP SERPL CALC-SCNC: 10 MEQ/L (ref 8–16)
BUN SERPL-MCNC: 13 MG/DL (ref 7–22)
CALCIUM SERPL-MCNC: 9.4 MG/DL (ref 8.5–10.5)
CHLORIDE SERPL-SCNC: 96 MEQ/L (ref 98–111)
CO2 SERPL-SCNC: 28 MEQ/L (ref 23–33)
CREAT SERPL-MCNC: 0.7 MG/DL (ref 0.4–1.2)
DEPRECATED RDW RBC AUTO: 43.5 FL (ref 35–45)
EKG ATRIAL RATE: 89 BPM
EKG P AXIS: 70 DEGREES
EKG P-R INTERVAL: 174 MS
EKG Q-T INTERVAL: 354 MS
EKG QRS DURATION: 74 MS
EKG QTC CALCULATION (BAZETT): 430 MS
EKG R AXIS: 68 DEGREES
EKG T AXIS: 42 DEGREES
EKG VENTRICULAR RATE: 89 BPM
ERYTHROCYTE [DISTWIDTH] IN BLOOD BY AUTOMATED COUNT: 12.5 % (ref 11.5–14.5)
GFR SERPL CREATININE-BSD FRML MDRD: > 60 ML/MIN/1.73M2
GLUCOSE SERPL-MCNC: 86 MG/DL (ref 70–108)
HCT VFR BLD AUTO: 33.5 % (ref 37–47)
HGB BLD-MCNC: 11 GM/DL (ref 12–16)
MAGNESIUM SERPL-MCNC: 1.9 MG/DL (ref 1.6–2.4)
MCH RBC QN AUTO: 31.4 PG (ref 26–33)
MCHC RBC AUTO-ENTMCNC: 32.8 GM/DL (ref 32.2–35.5)
MCV RBC AUTO: 95.7 FL (ref 81–99)
PLATELET # BLD AUTO: 351 THOU/MM3 (ref 130–400)
PMV BLD AUTO: 8.6 FL (ref 9.4–12.4)
POTASSIUM SERPL-SCNC: 4.3 MEQ/L (ref 3.5–5.2)
RBC # BLD AUTO: 3.5 MILL/MM3 (ref 4.2–5.4)
SODIUM SERPL-SCNC: 134 MEQ/L (ref 135–145)
WBC # BLD AUTO: 9.2 THOU/MM3 (ref 4.8–10.8)

## 2024-03-18 PROCEDURE — 80048 BASIC METABOLIC PNL TOTAL CA: CPT

## 2024-03-18 PROCEDURE — 36415 COLL VENOUS BLD VENIPUNCTURE: CPT

## 2024-03-18 PROCEDURE — 2140000000 HC CCU INTERMEDIATE R&B

## 2024-03-18 PROCEDURE — 97530 THERAPEUTIC ACTIVITIES: CPT

## 2024-03-18 PROCEDURE — 97535 SELF CARE MNGMENT TRAINING: CPT

## 2024-03-18 PROCEDURE — 6370000000 HC RX 637 (ALT 250 FOR IP): Performed by: PHYSICIAN ASSISTANT

## 2024-03-18 PROCEDURE — 6370000000 HC RX 637 (ALT 250 FOR IP): Performed by: REGISTERED NURSE

## 2024-03-18 PROCEDURE — 93010 ELECTROCARDIOGRAM REPORT: CPT | Performed by: INTERNAL MEDICINE

## 2024-03-18 PROCEDURE — 99232 SBSQ HOSP IP/OBS MODERATE 35: CPT | Performed by: NURSE PRACTITIONER

## 2024-03-18 PROCEDURE — 6370000000 HC RX 637 (ALT 250 FOR IP): Performed by: NURSE PRACTITIONER

## 2024-03-18 PROCEDURE — 97110 THERAPEUTIC EXERCISES: CPT

## 2024-03-18 PROCEDURE — 83735 ASSAY OF MAGNESIUM: CPT

## 2024-03-18 PROCEDURE — 99231 SBSQ HOSP IP/OBS SF/LOW 25: CPT | Performed by: NURSE PRACTITIONER

## 2024-03-18 PROCEDURE — 99232 SBSQ HOSP IP/OBS MODERATE 35: CPT | Performed by: PHYSICIAN ASSISTANT

## 2024-03-18 PROCEDURE — 2500000003 HC RX 250 WO HCPCS: Performed by: NURSE PRACTITIONER

## 2024-03-18 PROCEDURE — 85027 COMPLETE CBC AUTOMATED: CPT

## 2024-03-18 RX ORDER — AMIODARONE HYDROCHLORIDE 200 MG/1
200 TABLET ORAL DAILY
Status: DISCONTINUED | OUTPATIENT
Start: 2024-03-18 | End: 2024-03-19 | Stop reason: HOSPADM

## 2024-03-18 RX ORDER — TRAMADOL HYDROCHLORIDE 50 MG/1
50 TABLET ORAL EVERY 6 HOURS PRN
Status: DISCONTINUED | OUTPATIENT
Start: 2024-03-18 | End: 2024-03-19 | Stop reason: HOSPADM

## 2024-03-18 RX ORDER — ACETAMINOPHEN 500 MG
1000 TABLET ORAL EVERY 6 HOURS PRN
Status: DISCONTINUED | OUTPATIENT
Start: 2024-03-18 | End: 2024-03-19 | Stop reason: HOSPADM

## 2024-03-18 RX ORDER — TRAMADOL HYDROCHLORIDE 50 MG/1
100 TABLET ORAL EVERY 6 HOURS PRN
Status: DISCONTINUED | OUTPATIENT
Start: 2024-03-18 | End: 2024-03-19 | Stop reason: HOSPADM

## 2024-03-18 RX ADMIN — APIXABAN 5 MG: 5 TABLET, FILM COATED ORAL at 09:51

## 2024-03-18 RX ADMIN — Medication 400 MG: at 09:51

## 2024-03-18 RX ADMIN — ACETAMINOPHEN 650 MG: 325 TABLET ORAL at 09:54

## 2024-03-18 RX ADMIN — APIXABAN 5 MG: 5 TABLET, FILM COATED ORAL at 20:54

## 2024-03-18 RX ADMIN — AMIODARONE HYDROCHLORIDE 0.5 MG/MIN: 1.8 INJECTION, SOLUTION INTRAVENOUS at 05:37

## 2024-03-18 RX ADMIN — Medication 1 TABLET: at 09:51

## 2024-03-18 RX ADMIN — ACETAMINOPHEN 1000 MG: 500 TABLET ORAL at 20:54

## 2024-03-18 RX ADMIN — TRAMADOL HYDROCHLORIDE 50 MG: 50 TABLET ORAL at 20:52

## 2024-03-18 RX ADMIN — AMIODARONE HYDROCHLORIDE 200 MG: 200 TABLET ORAL at 12:00

## 2024-03-18 RX ADMIN — METOPROLOL SUCCINATE 25 MG: 25 TABLET, EXTENDED RELEASE ORAL at 09:54

## 2024-03-18 ASSESSMENT — PAIN SCALES - GENERAL
PAINLEVEL_OUTOF10: 8
PAINLEVEL_OUTOF10: 6
PAINLEVEL_OUTOF10: 2

## 2024-03-18 ASSESSMENT — PAIN DESCRIPTION - ORIENTATION
ORIENTATION: LEFT
ORIENTATION: LEFT

## 2024-03-18 ASSESSMENT — PAIN DESCRIPTION - DESCRIPTORS
DESCRIPTORS: SHOOTING
DESCRIPTORS: ACHING

## 2024-03-18 ASSESSMENT — PAIN DESCRIPTION - LOCATION
LOCATION: LEG
LOCATION: KNEE

## 2024-03-18 NOTE — CARE COORDINATION
3/18/24, 2:37 PM EDT    DISCHARGE ON GOING EVALUATION    Kirstin Estrada       [facility] day: 6  Location: [address] [place] [address]A Reason for admit: New onset atrial fibrillation (HCC) [I48.91]  Atrial fibrillation with rapid ventricular response (HCC) [I48.91]  Status post right knee replacement [Z96.651]   Procedure:   3/11 CXR: No consolidation. Mild bronchial wall thickening.   3/12 Echo: EF 55-60%.     Barriers to Discharge: Hospitalist, Ortho, Cardiology and PM&R following. Pt went back into afib over the weekend and has again converted on own. BP did drop when in afib, now stable. Started iv Amiodarone, to have x24hr, then change to po per Cardiology. Pt is able to go to IPR tomorrow.     PCP: Evelio Hennessy MD  Readmission Risk Score: 8%  Patient Goals/Plan/Treatment Preferences: From home alone with family support. Planning IPR at discharge.

## 2024-03-19 ENCOUNTER — HOSPITAL ENCOUNTER (INPATIENT)
Age: 79
LOS: 11 days | Discharge: HOME | End: 2024-03-30
Attending: STUDENT IN AN ORGANIZED HEALTH CARE EDUCATION/TRAINING PROGRAM | Admitting: STUDENT IN AN ORGANIZED HEALTH CARE EDUCATION/TRAINING PROGRAM
Payer: MEDICARE

## 2024-03-19 ENCOUNTER — APPOINTMENT (OUTPATIENT)
Age: 79
DRG: 315 | End: 2024-03-19
Payer: MEDICARE

## 2024-03-19 VITALS
HEART RATE: 80 BPM | HEIGHT: 63 IN | BODY MASS INDEX: 24.34 KG/M2 | WEIGHT: 137.35 LBS | OXYGEN SATURATION: 99 % | SYSTOLIC BLOOD PRESSURE: 109 MMHG | DIASTOLIC BLOOD PRESSURE: 88 MMHG | TEMPERATURE: 97.7 F | RESPIRATION RATE: 16 BRPM

## 2024-03-19 DIAGNOSIS — Z96.652 S/P TKR (TOTAL KNEE REPLACEMENT), LEFT: ICD-10-CM

## 2024-03-19 DIAGNOSIS — I48.91 ATRIAL FIBRILLATION WITH RAPID VENTRICULAR RESPONSE (HCC): Primary | ICD-10-CM

## 2024-03-19 DIAGNOSIS — R60.0 LEG EDEMA, LEFT: ICD-10-CM

## 2024-03-19 DIAGNOSIS — Z96.652 S/P TOTAL KNEE ARTHROPLASTY, LEFT: ICD-10-CM

## 2024-03-19 DIAGNOSIS — Z96.652 HISTORY OF TOTAL KNEE ARTHROPLASTY, LEFT: ICD-10-CM

## 2024-03-19 DIAGNOSIS — Z86.79 HISTORY OF ESSENTIAL HYPERTENSION: ICD-10-CM

## 2024-03-19 LAB
ALBUMIN SERPL BCG-MCNC: 3.3 G/DL (ref 3.5–5.1)
ALP SERPL-CCNC: 69 U/L (ref 38–126)
ALT SERPL W/O P-5'-P-CCNC: 25 U/L (ref 11–66)
ANION GAP SERPL CALC-SCNC: 14 MEQ/L (ref 8–16)
AST SERPL-CCNC: 28 U/L (ref 5–40)
BILIRUB CONJ SERPL-MCNC: < 0.2 MG/DL (ref 0–0.3)
BILIRUB SERPL-MCNC: 0.7 MG/DL (ref 0.3–1.2)
BUN SERPL-MCNC: 15 MG/DL (ref 7–22)
CALCIUM SERPL-MCNC: 8.6 MG/DL (ref 8.5–10.5)
CHLORIDE SERPL-SCNC: 94 MEQ/L (ref 98–111)
CO2 SERPL-SCNC: 25 MEQ/L (ref 23–33)
CREAT SERPL-MCNC: 0.6 MG/DL (ref 0.4–1.2)
ECHO BSA: 1.7 M2
EKG ATRIAL RATE: 85 BPM
EKG P AXIS: 62 DEGREES
EKG P-R INTERVAL: 162 MS
EKG Q-T INTERVAL: 388 MS
EKG QRS DURATION: 74 MS
EKG QTC CALCULATION (BAZETT): 461 MS
EKG R AXIS: 48 DEGREES
EKG T AXIS: 31 DEGREES
EKG VENTRICULAR RATE: 85 BPM
GFR SERPL CREATININE-BSD FRML MDRD: > 60 ML/MIN/1.73M2
GLUCOSE SERPL-MCNC: 96 MG/DL (ref 70–108)
MAGNESIUM SERPL-MCNC: 2.1 MG/DL (ref 1.6–2.4)
PHOSPHATE SERPL-MCNC: 3 MG/DL (ref 2.4–4.7)
POTASSIUM SERPL-SCNC: 4.2 MEQ/L (ref 3.5–5.2)
PROT SERPL-MCNC: 6.4 G/DL (ref 6.1–8)
SODIUM SERPL-SCNC: 133 MEQ/L (ref 135–145)

## 2024-03-19 PROCEDURE — 6370000000 HC RX 637 (ALT 250 FOR IP): Performed by: NURSE PRACTITIONER

## 2024-03-19 PROCEDURE — 36415 COLL VENOUS BLD VENIPUNCTURE: CPT

## 2024-03-19 PROCEDURE — 6370000000 HC RX 637 (ALT 250 FOR IP): Performed by: REGISTERED NURSE

## 2024-03-19 PROCEDURE — 82248 BILIRUBIN DIRECT: CPT

## 2024-03-19 PROCEDURE — 97110 THERAPEUTIC EXERCISES: CPT

## 2024-03-19 PROCEDURE — 6370000000 HC RX 637 (ALT 250 FOR IP): Performed by: STUDENT IN AN ORGANIZED HEALTH CARE EDUCATION/TRAINING PROGRAM

## 2024-03-19 PROCEDURE — 80053 COMPREHEN METABOLIC PANEL: CPT

## 2024-03-19 PROCEDURE — 97530 THERAPEUTIC ACTIVITIES: CPT

## 2024-03-19 PROCEDURE — 99239 HOSP IP/OBS DSCHRG MGMT >30: CPT | Performed by: STUDENT IN AN ORGANIZED HEALTH CARE EDUCATION/TRAINING PROGRAM

## 2024-03-19 PROCEDURE — 93005 ELECTROCARDIOGRAM TRACING: CPT | Performed by: NURSE PRACTITIONER

## 2024-03-19 PROCEDURE — 97116 GAIT TRAINING THERAPY: CPT

## 2024-03-19 PROCEDURE — 84100 ASSAY OF PHOSPHORUS: CPT

## 2024-03-19 PROCEDURE — 83735 ASSAY OF MAGNESIUM: CPT

## 2024-03-19 PROCEDURE — 6370000000 HC RX 637 (ALT 250 FOR IP): Performed by: PHYSICIAN ASSISTANT

## 2024-03-19 PROCEDURE — 2580000003 HC RX 258: Performed by: NURSE PRACTITIONER

## 2024-03-19 PROCEDURE — 93270 REMOTE 30 DAY ECG REV/REPORT: CPT

## 2024-03-19 PROCEDURE — 2580000003 HC RX 258: Performed by: STUDENT IN AN ORGANIZED HEALTH CARE EDUCATION/TRAINING PROGRAM

## 2024-03-19 PROCEDURE — 93005 ELECTROCARDIOGRAM TRACING: CPT | Performed by: FAMILY MEDICINE

## 2024-03-19 PROCEDURE — 1180000000 HC REHAB R&B

## 2024-03-19 PROCEDURE — 93010 ELECTROCARDIOGRAM REPORT: CPT | Performed by: INTERNAL MEDICINE

## 2024-03-19 PROCEDURE — 99222 1ST HOSP IP/OBS MODERATE 55: CPT | Performed by: STUDENT IN AN ORGANIZED HEALTH CARE EDUCATION/TRAINING PROGRAM

## 2024-03-19 RX ORDER — MAGNESIUM SULFATE IN WATER 40 MG/ML
2000 INJECTION, SOLUTION INTRAVENOUS PRN
Status: CANCELLED | OUTPATIENT
Start: 2024-03-19

## 2024-03-19 RX ORDER — MULTIVITAMIN WITH IRON
1 TABLET ORAL DAILY
Status: DISCONTINUED | OUTPATIENT
Start: 2024-03-20 | End: 2024-03-30 | Stop reason: HOSPADM

## 2024-03-19 RX ORDER — TRAMADOL HYDROCHLORIDE 50 MG/1
50 TABLET ORAL EVERY 6 HOURS PRN
Status: CANCELLED | OUTPATIENT
Start: 2024-03-19

## 2024-03-19 RX ORDER — HYDROCHLOROTHIAZIDE 12.5 MG/1
12.5 TABLET ORAL EVERY MORNING
Qty: 30 TABLET | Refills: 0 | Status: SHIPPED | OUTPATIENT
Start: 2024-03-19 | End: 2024-03-19 | Stop reason: HOSPADM

## 2024-03-19 RX ORDER — TRAMADOL HYDROCHLORIDE 50 MG/1
50 TABLET ORAL EVERY 6 HOURS PRN
Status: DISCONTINUED | OUTPATIENT
Start: 2024-03-19 | End: 2024-03-30 | Stop reason: HOSPADM

## 2024-03-19 RX ORDER — METOPROLOL SUCCINATE 25 MG/1
25 TABLET, EXTENDED RELEASE ORAL DAILY
Qty: 30 TABLET | Refills: 0 | Status: ON HOLD | OUTPATIENT
Start: 2024-03-20 | End: 2024-04-19

## 2024-03-19 RX ORDER — HYDROCODONE BITARTRATE AND ACETAMINOPHEN 5; 325 MG/1; MG/1
2 TABLET ORAL EVERY 4 HOURS PRN
Status: DISCONTINUED | OUTPATIENT
Start: 2024-03-19 | End: 2024-03-19

## 2024-03-19 RX ORDER — LANOLIN ALCOHOL/MO/W.PET/CERES
400 CREAM (GRAM) TOPICAL DAILY
Status: CANCELLED | OUTPATIENT
Start: 2024-03-20

## 2024-03-19 RX ORDER — ACETAMINOPHEN 500 MG
1000 TABLET ORAL EVERY 6 HOURS PRN
Status: CANCELLED | OUTPATIENT
Start: 2024-03-19

## 2024-03-19 RX ORDER — TRAMADOL HYDROCHLORIDE 50 MG/1
100 TABLET ORAL EVERY 6 HOURS PRN
Status: DISCONTINUED | OUTPATIENT
Start: 2024-03-19 | End: 2024-03-30 | Stop reason: HOSPADM

## 2024-03-19 RX ORDER — POLYETHYLENE GLYCOL 3350 17 G/17G
17 POWDER, FOR SOLUTION ORAL DAILY PRN
Status: DISCONTINUED | OUTPATIENT
Start: 2024-03-19 | End: 2024-03-30 | Stop reason: HOSPADM

## 2024-03-19 RX ORDER — MAGNESIUM SULFATE IN WATER 40 MG/ML
2000 INJECTION, SOLUTION INTRAVENOUS PRN
Status: DISCONTINUED | OUTPATIENT
Start: 2024-03-19 | End: 2024-03-30 | Stop reason: HOSPADM

## 2024-03-19 RX ORDER — METOPROLOL SUCCINATE 25 MG/1
25 TABLET, EXTENDED RELEASE ORAL DAILY
Status: CANCELLED | OUTPATIENT
Start: 2024-03-20

## 2024-03-19 RX ORDER — POTASSIUM CHLORIDE 7.45 MG/ML
10 INJECTION INTRAVENOUS PRN
Status: DISCONTINUED | OUTPATIENT
Start: 2024-03-19 | End: 2024-03-30 | Stop reason: HOSPADM

## 2024-03-19 RX ORDER — LANOLIN ALCOHOL/MO/W.PET/CERES
3 CREAM (GRAM) TOPICAL NIGHTLY PRN
Status: DISCONTINUED | OUTPATIENT
Start: 2024-03-19 | End: 2024-03-30 | Stop reason: HOSPADM

## 2024-03-19 RX ORDER — AMIODARONE HYDROCHLORIDE 200 MG/1
200 TABLET ORAL DAILY
Status: CANCELLED | OUTPATIENT
Start: 2024-03-20

## 2024-03-19 RX ORDER — HYDROCODONE BITARTRATE AND ACETAMINOPHEN 5; 325 MG/1; MG/1
1 TABLET ORAL EVERY 4 HOURS PRN
Status: DISCONTINUED | OUTPATIENT
Start: 2024-03-19 | End: 2024-03-19

## 2024-03-19 RX ORDER — LANOLIN ALCOHOL/MO/W.PET/CERES
400 CREAM (GRAM) TOPICAL DAILY
Status: DISCONTINUED | OUTPATIENT
Start: 2024-03-20 | End: 2024-03-30 | Stop reason: HOSPADM

## 2024-03-19 RX ORDER — SODIUM CHLORIDE 9 MG/ML
INJECTION, SOLUTION INTRAVENOUS PRN
Status: CANCELLED | OUTPATIENT
Start: 2024-03-19

## 2024-03-19 RX ORDER — ONDANSETRON 4 MG/1
4 TABLET, ORALLY DISINTEGRATING ORAL EVERY 8 HOURS PRN
Status: CANCELLED | OUTPATIENT
Start: 2024-03-19

## 2024-03-19 RX ORDER — SODIUM CHLORIDE 9 MG/ML
INJECTION, SOLUTION INTRAVENOUS PRN
Status: DISCONTINUED | OUTPATIENT
Start: 2024-03-19 | End: 2024-03-30 | Stop reason: HOSPADM

## 2024-03-19 RX ORDER — POTASSIUM CHLORIDE 7.45 MG/ML
10 INJECTION INTRAVENOUS PRN
Status: CANCELLED | OUTPATIENT
Start: 2024-03-19

## 2024-03-19 RX ORDER — POLYETHYLENE GLYCOL 3350 17 G/17G
17 POWDER, FOR SOLUTION ORAL DAILY PRN
Status: CANCELLED | OUTPATIENT
Start: 2024-03-19

## 2024-03-19 RX ORDER — SODIUM CHLORIDE 0.9 % (FLUSH) 0.9 %
5-40 SYRINGE (ML) INJECTION PRN
Status: CANCELLED | OUTPATIENT
Start: 2024-03-19

## 2024-03-19 RX ORDER — ONDANSETRON 4 MG/1
4 TABLET, ORALLY DISINTEGRATING ORAL EVERY 8 HOURS PRN
Status: DISCONTINUED | OUTPATIENT
Start: 2024-03-19 | End: 2024-03-30 | Stop reason: HOSPADM

## 2024-03-19 RX ORDER — MULTIVITAMIN WITH IRON
1 TABLET ORAL DAILY
Status: CANCELLED | OUTPATIENT
Start: 2024-03-20

## 2024-03-19 RX ORDER — HYDROCODONE BITARTRATE AND ACETAMINOPHEN 5; 325 MG/1; MG/1
1 TABLET ORAL EVERY 4 HOURS PRN
Status: CANCELLED | OUTPATIENT
Start: 2024-03-19

## 2024-03-19 RX ORDER — SODIUM CHLORIDE 0.9 % (FLUSH) 0.9 %
5-40 SYRINGE (ML) INJECTION EVERY 12 HOURS SCHEDULED
Status: DISCONTINUED | OUTPATIENT
Start: 2024-03-19 | End: 2024-03-24

## 2024-03-19 RX ORDER — AMIODARONE HYDROCHLORIDE 200 MG/1
200 TABLET ORAL DAILY
Qty: 30 TABLET | Refills: 0 | Status: ON HOLD | OUTPATIENT
Start: 2024-03-20 | End: 2024-04-19

## 2024-03-19 RX ORDER — TRAMADOL HYDROCHLORIDE 50 MG/1
100 TABLET ORAL EVERY 6 HOURS PRN
Status: CANCELLED | OUTPATIENT
Start: 2024-03-19

## 2024-03-19 RX ORDER — ACETAMINOPHEN 500 MG
1000 TABLET ORAL EVERY 6 HOURS PRN
Status: DISCONTINUED | OUTPATIENT
Start: 2024-03-19 | End: 2024-03-30 | Stop reason: HOSPADM

## 2024-03-19 RX ORDER — SODIUM CHLORIDE 0.9 % (FLUSH) 0.9 %
5-40 SYRINGE (ML) INJECTION EVERY 12 HOURS SCHEDULED
Status: CANCELLED | OUTPATIENT
Start: 2024-03-19

## 2024-03-19 RX ORDER — SODIUM CHLORIDE 0.9 % (FLUSH) 0.9 %
5-40 SYRINGE (ML) INJECTION PRN
Status: DISCONTINUED | OUTPATIENT
Start: 2024-03-19 | End: 2024-03-30 | Stop reason: HOSPADM

## 2024-03-19 RX ORDER — POTASSIUM CHLORIDE 20 MEQ/1
40 TABLET, EXTENDED RELEASE ORAL PRN
Status: CANCELLED | OUTPATIENT
Start: 2024-03-19

## 2024-03-19 RX ORDER — HYDROCODONE BITARTRATE AND ACETAMINOPHEN 5; 325 MG/1; MG/1
2 TABLET ORAL EVERY 4 HOURS PRN
Status: CANCELLED | OUTPATIENT
Start: 2024-03-19

## 2024-03-19 RX ORDER — AMIODARONE HYDROCHLORIDE 200 MG/1
200 TABLET ORAL DAILY
Status: DISCONTINUED | OUTPATIENT
Start: 2024-03-20 | End: 2024-03-20

## 2024-03-19 RX ORDER — METOPROLOL SUCCINATE 25 MG/1
25 TABLET, EXTENDED RELEASE ORAL DAILY
Status: DISCONTINUED | OUTPATIENT
Start: 2024-03-20 | End: 2024-03-20

## 2024-03-19 RX ORDER — POTASSIUM CHLORIDE 20 MEQ/1
40 TABLET, EXTENDED RELEASE ORAL PRN
Status: DISCONTINUED | OUTPATIENT
Start: 2024-03-19 | End: 2024-03-30 | Stop reason: HOSPADM

## 2024-03-19 RX ORDER — LANOLIN ALCOHOL/MO/W.PET/CERES
3 CREAM (GRAM) TOPICAL NIGHTLY PRN
Status: CANCELLED | OUTPATIENT
Start: 2024-03-19

## 2024-03-19 RX ADMIN — Medication 1 TABLET: at 08:46

## 2024-03-19 RX ADMIN — APIXABAN 5 MG: 5 TABLET, FILM COATED ORAL at 20:22

## 2024-03-19 RX ADMIN — SODIUM CHLORIDE, PRESERVATIVE FREE 10 ML: 5 INJECTION INTRAVENOUS at 08:57

## 2024-03-19 RX ADMIN — ACETAMINOPHEN 1000 MG: 500 TABLET ORAL at 20:22

## 2024-03-19 RX ADMIN — Medication 3 MG: at 22:01

## 2024-03-19 RX ADMIN — APIXABAN 5 MG: 5 TABLET, FILM COATED ORAL at 08:46

## 2024-03-19 RX ADMIN — METOPROLOL SUCCINATE 25 MG: 25 TABLET, EXTENDED RELEASE ORAL at 08:46

## 2024-03-19 RX ADMIN — TRAMADOL HYDROCHLORIDE 100 MG: 50 TABLET ORAL at 22:01

## 2024-03-19 RX ADMIN — Medication 400 MG: at 08:46

## 2024-03-19 RX ADMIN — AMIODARONE HYDROCHLORIDE 200 MG: 200 TABLET ORAL at 08:46

## 2024-03-19 RX ADMIN — TRAMADOL HYDROCHLORIDE 50 MG: 50 TABLET ORAL at 08:57

## 2024-03-19 RX ADMIN — ACETAMINOPHEN 1000 MG: 500 TABLET ORAL at 11:31

## 2024-03-19 ASSESSMENT — PAIN DESCRIPTION - ORIENTATION
ORIENTATION: LEFT

## 2024-03-19 ASSESSMENT — PAIN SCALES - GENERAL
PAINLEVEL_OUTOF10: 6
PAINLEVEL_OUTOF10: 0
PAINLEVEL_OUTOF10: 2
PAINLEVEL_OUTOF10: 0
PAINLEVEL_OUTOF10: 5
PAINLEVEL_OUTOF10: 2
PAINLEVEL_OUTOF10: 5
PAINLEVEL_OUTOF10: 6
PAINLEVEL_OUTOF10: 0
PAINLEVEL_OUTOF10: 8
PAINLEVEL_OUTOF10: 4
PAINLEVEL_OUTOF10: 5
PAINLEVEL_OUTOF10: 3
PAINLEVEL_OUTOF10: 4

## 2024-03-19 ASSESSMENT — PAIN - FUNCTIONAL ASSESSMENT
PAIN_FUNCTIONAL_ASSESSMENT: PREVENTS OR INTERFERES SOME ACTIVE ACTIVITIES AND ADLS
PAIN_FUNCTIONAL_ASSESSMENT: PREVENTS OR INTERFERES WITH ALL ACTIVE AND SOME PASSIVE ACTIVITIES
PAIN_FUNCTIONAL_ASSESSMENT: PREVENTS OR INTERFERES WITH MANY ACTIVE NOT PASSIVE ACTIVITIES
PAIN_FUNCTIONAL_ASSESSMENT: PREVENTS OR INTERFERES WITH MANY ACTIVE NOT PASSIVE ACTIVITIES

## 2024-03-19 ASSESSMENT — PAIN DESCRIPTION - LOCATION
LOCATION: KNEE

## 2024-03-19 ASSESSMENT — PAIN DESCRIPTION - DESCRIPTORS
DESCRIPTORS: ACHING
DESCRIPTORS: ACHING
DESCRIPTORS: SPASM
DESCRIPTORS: ACHING
DESCRIPTORS: POUNDING
DESCRIPTORS: ACHING
DESCRIPTORS: ACHING
DESCRIPTORS: HEAVINESS;ACHING

## 2024-03-19 ASSESSMENT — PAIN DESCRIPTION - PAIN TYPE
TYPE: SURGICAL PAIN
TYPE: OTHER (COMMENT)
TYPE: SURGICAL PAIN

## 2024-03-19 ASSESSMENT — PAIN DESCRIPTION - ONSET
ONSET: ON-GOING

## 2024-03-19 ASSESSMENT — PAIN DESCRIPTION - FREQUENCY
FREQUENCY: CONTINUOUS

## 2024-03-19 ASSESSMENT — PAIN SCALES - WONG BAKER: WONGBAKER_NUMERICALRESPONSE: NO HURT

## 2024-03-19 NOTE — PROGRESS NOTES
Admitted to the Inpatient Rehabilitation Unit via wheelchair.  Patient was then oriented to room and unit.  Education provided on the rehabilitation routine: three hours of therapy five days per week.      Explained patients right to have family, representative or physician notified of their admission.  Patient has Declined for physician to be notified.  Patient has Declined for family/representative to be notified.    Admitting medication orders compared with acute stay medications; home medication list reviewed with patient/family.  Medication issues identified No  Medication issue: n/a      Transportation:   Has transportation kept you from medical appointments, meetings, work, or from getting things needed for daily living? (Check all that apply)  No.      Health Literacy:   How often do you need to have someone help you when you read instructions, pamphlets, or other written material from your doctor or pharmacy?  0. - Never    Social Isolation:  How often do you feel lonely or isolated from those around you?  0. Never    Patient Mood Interview (PHQ-2 to 9) (from [employer]©)   Say to Patient: \"Over the last 2 weeks, have you been bothered by any of the following problems?\"   If symptom is present, enter yes in column 1 (Symptom Presence)  If yes in column 1, then ask the patient: “About how often have you been bothered by this?” Indicate response in column 2, Symptom Frequency.   Symptom Presence  No    Yes   9.    No response  Symptom Frequency  Never or 1 day  2-6 days (several days)  7-11 days (half or more of the days)  12-14 days (nearly every day)    Symptom Presence Symptom Frequency   Little interest or pleasure in doing things 0. No 0. Never or 1 day   Feeling down, depressed, or hopeless 0. No 0. Never or 1 day   If either A or B above has symptom frequency coded 2 or 3, CONTINUE asking questions below.      If not, END the interview  and right click on next table to delete.

## 2024-03-19 NOTE — H&P
Physical Medicine & Rehabilitation   History and Physical    Chief Complaint and Reason for Rehabilitation Admission: Debility 2/2 TKA and new onset a-fib    History of Present Illness:  Kirstin Estrada is a 78 y.o. female with PMH significant for hypertension, hyperlipidemia, and hyponatremia who was admitted to [facility] on 3/11/2024.  History obtained via: ED documentation, acute care documentation, and patient     Patient initially presented to [facility] ED on 3/11/2024 from [facility] for evaluation of new onset A-fib with RVR and hypotension during procedure.  Per report, patient underwent a left total knee replacement by Dr. Salazar at [facility] on 3/11/2024.  Patient reportedly tolerated the procedure well, however, after the patient procedure she was noted to have new onset of A-fib with RVR along with hypotension.  Patient was given beta-blocker and IV fluids and transferred to [facility] for further evaluation management.  On arrival to ED, patient's heart rate noted to be in the 130s.  Patient was hypotensive.  EKG reportedly revealed a sinus tachycardia.  Patient was admitted for further evaluation and management.     On admission Ortho and cardiology were consulted.  Per Ortho, patient is WBAT to left lower extremity.  Dressing change on POD 2.  Scheduled to follow-up with Dr. Salazar in 2 weeks.  Cardiology evaluated patient.  Recommend continuing metoprolol 100 mg daily.  Patient noted to have an elevated BNP which was thought to likely be related to A-fib.  Recommending a 30-day event monitor to determine burden and need for chronic OAC.    During acute care stay, patient was in and out of A-fib with RVR.  On multiple occasions, plan was to pursue cardioversion, however, patient will then return to normal sinus rhythm.  Patient was started on Eliquis 5 mg twice daily.  Additionally, beta-blocker was increased.  Patient was transitioned from IV amiodarone to amiodarone 200 mg daily.  Patient will need close  outpatient follow-up with cardiology.     PM&R consulted to assess for rehab needs. On day of initial consultation, patient is converted to normal sinus rhythm.  Patient was seen with daughter at bedside. Patient is a retired nurse. She complains of some left knee pain that is worse with activity.  Patient reports that prior to this hospitalization, she was completely independent with all ADLs and mobility without the use of any assistive devices.  She does live alone in a single-story house with one-step to enter.  She has 1 son who lives locally who will be able to check in daily but he does work.  Patient's daughter lives in [place].  Patient is interested in pursuing an acute patient rehab stay in order to maximize her functional progress and independence prior to returning home.    On admission to inpatient rehabilitation unit, patient reports that overall her pain is improving.  She continues to have some left knee pain that is worse with activity.  She denies any chest pain or shortness of breath.  No reports of any significant changes to bowel or bladder.  She does endorse a decrease in her appetite which she attributes to \"hospital food\".      Current Rehabilitation Assessments:  PT 03/19/2024:  Bed Mobility:  Supine to Sit: Stand By Assistance  Sit to Supine: Minimal Assistance      Transfers:  Sit to Stand: Stand By Assistance  Stand to Sit:Stand By Assistance  Slow and guarded and limited tolerance of left knee flexion pt keeps it out in front to stand and kicks it up when she sits down   Ambulation:  Contact Guard Assistance, to SBA   Distance: 50x1  Surface: Level Tile  Device:Rolling Walker  Gait Deviations:  Slow Cammie and noted left LE toeing out and worked on proper alignment, also worked on increased stance at left w/ and increased step length at right as pt tends to demonstrate a step to pattern      Balance:  Pt stood w/ martha UE at support completed pre-gait activity with wt shifting and

## 2024-03-19 NOTE — PROGRESS NOTES
Physical Medicine & Rehabilitation   Progress Note    Chief Complaint:  Debility 2/2 TKA and new onset a-fib     Subjective:  Patient seen and examined. NAEO. Overnight episode of A-fib with RVR. Rapid response was called and patient given cardizem 90 mg once and monitored on bedside tele. HR improved to the 80. Patient was reportedly still in a-fib but rate better controlled. She was started on Cardizem 90mg Q6H by hospitalist. This morning, HR in the 40s. Cardiology re-consulted. Discussed with Dr. Gottlieb- he is okay to continue to see patient along side of cardiology. Patient with ongoing frustrations and just feels that this needs to be taken care of she she can focus on therapies. Patient even refused PT this morning. We discussed that to be on IPR unit she does have to be able to participate with therapies- she is agreeable. Discussed with Cardiology and consult placed. They reported that she will likely need ablation as an outpatient. Cardiology recommending that Cardizem be held given low HR this morning.     Later discussed case with Dr. Gottlieb- discussed low Na. He is planning to follow with repeat labs tomorrow.       Rehabilitation: IPR assessments are pending  PT 03/19/2024:  Bed Mobility:  Supine to Sit: Stand By Assistance  Sit to Supine: Minimal Assistance      Transfers:  Sit to Stand: Stand By Assistance  Stand to Sit:Stand By Assistance  Slow and guarded and limited tolerance of left knee flexion pt keeps it out in front to stand and kicks it up when she sits down   Ambulation:  Contact Guard Assistance, to SBA   Distance: 50x1  Surface: Level Tile  Device:Rolling Walker  Gait Deviations:  Slow Cammie and noted left LE toeing out and worked on proper alignment, also worked on increased stance at left w/ and increased step length at right as pt tends to demonstrate a step to pattern      Balance:  Pt stood w/ martha UE at support completed pre-gait activity with wt shifting and controlled

## 2024-03-19 NOTE — CARE COORDINATION
3/19/24, 9:29 AM EDT    Patient goals/plan/ treatment preferences discussed by  and .  Patient goals/plan/ treatment preferences reviewed with patient/ family.  Patient/ family verbalize understanding of discharge plan and are in agreement with goal/plan/treatment preferences.  Understanding was demonstrated using the teach back method.  AVS provided by RN at time of discharge, which includes all necessary medical information pertaining to the patients current course of illness, treatment, post-discharge goals of care, and treatment preferences.     Services At/After Discharge: Inpatient rehab       IMM Letter  IMM Letter given to Patient/Family/Significant other/Guardian/POA/by:: Amina Titus RN Case Mgr.  IMM Letter date given:: 03/19/24  IMM Letter time given:: 0920  Observation Status Letter date given:: 03/12/24  Observation Status Letter time given:: 0507  Observation Status Letter given to Patient/Family/Significant other/Guardian/POA/by:: registration     Pt verbalized understanding and gave permission for possible discharge within 4 hours of receiving IMM.

## 2024-03-19 NOTE — PLAN OF CARE
Problem: Discharge Planning  Goal: Discharge to home or other facility with appropriate resources  Outcome: Progressing  Flowsheets (Taken 3/19/2024 1120)  Discharge to home or other facility with appropriate resources:   Identify barriers to discharge with patient and caregiver   Arrange for needed discharge resources and transportation as appropriate   Refer to discharge planning if patient needs post-hospital services based on physician order or complex needs related to functional status, cognitive ability or social support system     Problem: ABCDS Injury Assessment  Goal: Absence of physical injury  Outcome: Progressing  Flowsheets (Taken 3/16/2024 0429 by Liliana Johnson RN)  Absence of Physical Injury: Implement safety measures based on patient assessment     Problem: Safety - Adult  Goal: Free from fall injury  Outcome: Progressing  Flowsheets (Taken 3/19/2024 1120)  Free From Fall Injury: Instruct family/caregiver on patient safety     Problem: Pain  Goal: Verbalizes/displays adequate comfort level or baseline comfort level  Outcome: Progressing  Flowsheets (Taken 3/19/2024 1120)  Verbalizes/displays adequate comfort level or baseline comfort level:   Encourage patient to monitor pain and request assistance   Administer analgesics based on type and severity of pain and evaluate response   Assess pain using appropriate pain scale   Implement non-pharmacological measures as appropriate and evaluate response     Care plan reviewed with patient.  Patient verbalizes understanding of the care plan and contributed to goal setting.

## 2024-03-19 NOTE — PROGRESS NOTES
Hospitalist Progress Note    Patient:  Kirstin Estrada      Unit/Bed:3B-37/037-A    YOB: 1945    MRN: [id number]       Acct: [id number]     PCP: Evelio Hennessy MD    Date of Admission: 3/11/2024    Assessment/Plan:    New onset A-fib with RVR:   Cardiology consulted  Returned into A-fib with RVR 3/14/24, Cardizem gtt initiated, patient's blood pressure later dropped and she was transferred to step down floor. Patient later converted to NSR and cardizem gtt was stopped.  Echo obtained   [id number]  Ziac was not restarted at time of admission.  Metoprolol succinate substitute was restarted.  Lovenox 1 mg/kg twice daily dosing will be started, awaiting Ortho recommendations regarding when to start DOAC.    Essential hypertension: History for the past 4 to 5 years.  Patient is on Ziac (Bisprolol and HCTZ).  Ziac was not restarted at time of admission  Continue metoprolol    Left TKR: 3/11/24 under the care of Dr. Beaulieu.  Procedure was completed at [facility]  with spinal and nerve block.  Ortho to evaluate and provide further direction  Inpatient rehab consulted, hopefully will be able to accept tomorrow    Hyponatremia, chronic: Likely secondary to HCTZ.    Trend BMP PRN    Elevated liver enzymes, mild:   Trend PRN    Chief Complaint: Tachycardia and hypotension       Subjective: 78 y.o. female admitted to the hospitalist service for new onset A-fib with RVR. Patient converted back to NSR. Cardizem gtt stopped. Patient denies nausea or vomiting. She endorses BM.    Medications:    Infusion Medications    sodium chloride       Scheduled Medications    metoprolol succinate  100 mg Oral BID    potassium bicarbonate  50 mEq Oral Once    magnesium oxide  400 mg Oral Daily    apixaban  5 mg Oral BID    aspirin  162 mg Oral Daily    multivitamin  1 tablet Oral Daily    sodium chloride flush  5-40 mL IntraVENous 2 times per day     PRN Meds: HYDROcodone 5 mg - acetaminophen **OR** HYDROcodone 5 mg - 
    Hospitalist Progress Note    Patient:  Kirstin Estrada      Unit/Bed:3B-37/037-A    YOB: 1945    MRN: [id number]       Acct: [id number]     PCP: Evelio Hennessy MD    Date of Admission: 3/11/2024    Assessment/Plan:    New onset A-fib with RVR:   Cardiology consulted  Returned into A-fib with RVR 3/14/24, Cardizem gtt initiated, patient's blood pressure later dropped and she was transferred to step down floor. Patient later converted to NSR and cardizem gtt was stopped. Patient returned to A-fib with RVR 3/16/24, started on Amiodarone gtt, management per Cardiology, d/c to IPR once appropriate  Echo obtained   [id number]=0  Ziac was not restarted at time of admission.  Metoprolol succinate substitute was restarted.   Continue Eliquis    Essential hypertension: History for the past 4 to 5 years.  Patient is on Ziac (Bisprolol and HCTZ).  Ziac was not restarted at time of admission  Continue metoprolol  Started on amiodarone gtt    Left TKR: 3/11/24 under the care of Dr. Beaulieu.  Procedure was completed at [facility]  with spinal and nerve block.  Ortho to evaluate and provide further direction  Inpatient rehab consulted, hopefully will be able to accept tomorrow    Hyponatremia, chronic: Likely secondary to HCTZ.    Trend BMP PRN    Elevated liver enzymes, mild:   Trend PRN    Chief Complaint: Tachycardia and hypotension       Subjective: 78 y.o. female admitted to the hospitalist service for new onset A-fib with RVR. Patient denies chest pain. She denies nausea or vomiting.     Medications:    Infusion Medications    amiodarone 1 mg/min (03/17/24 1211)    Followed by    amiodarone      dilTIAZem 125 mg in sodium chloride 0.9 % 125 mL infusion 5 mg/hr (03/17/24 1031)    sodium chloride       Scheduled Medications    metoprolol succinate  25 mg Oral Daily    magnesium oxide  400 mg Oral Daily    apixaban  5 mg Oral BID    multivitamin  1 tablet Oral Daily    sodium chloride flush  5-40 mL IntraVENous 2 
    Hospitalist Progress Note    Patient:  Kirstin Estrada      Unit/Bed:3B-37/037-A    YOB: 1945    MRN: [id number]       Acct: [id number]     PCP: Evelio Hennessy MD    Date of Admission: 3/11/2024    Assessment/Plan:    New onset A-fib with RVR:   Cardiology consulted  Returned into A-fib with RVR 3/14/24, Cardizem gtt initiated, patient's blood pressure later dropped and she was transferred to step down floor. Patient later converted to NSR and cardizem gtt was stopped. Patient returned to A-fib with RVR 3/16/24, started on Amiodarone gtt, convert to oral, management per Cardiology, plan for possible d/c to IPR tomorrow  Echo obtained   [id number]  Ziac was not restarted at time of admission.  Metoprolol succinate substitute was restarted.   Continue Eliquis    Essential hypertension: History for the past 4 to 5 years.  Patient is on Ziac (Bisprolol and HCTZ).  Ziac was not restarted at time of admission  Continue metoprolol    Left TKR: 3/11/24 under the care of Dr. Beaulieu.  Procedure was completed at [facility]  with spinal and nerve block.  Ortho to evaluate and provide further direction  Tramadol ordered for LLE calf pain, could order doppler US to evaluate for DVT but patient is already on Eliquis    Hyponatremia, chronic: Likely secondary to HCTZ.    Trend BMP PRN    Elevated liver enzymes, mild:   Trend PRN    Chief Complaint: Tachycardia and hypotension       Subjective: 78 y.o. female admitted to the hospitalist service for new onset A-fib with RVR. Patient reports calf pain. Patient not wanting to take Norco so Tramadol was ordered. Tyelnol dose increased to 1000 mg q 6 hours PRN. Patient denies chest pain. She denies SOB. She denies nausea or vomiting.     Medications:    Infusion Medications    amiodarone 0.5 mg/min (03/18/24 0591)    dilTIAZem 125 mg in sodium chloride 0.9 % 125 mL infusion Stopped (03/17/24 5250)    sodium chloride       Scheduled Medications    metoprolol succinate  25 
    Hospitalist Progress Note    Patient:  Kirstin Estrada      Unit/Bed:3B-37/037-A    YOB: 1945    MRN: [id number]       Acct: [id number]     PCP: Evelio Hennessy MD    Date of Admission: 3/11/2024    Assessment/Plan:    New onset A-fib with RVR:   Cardiology consulted  Returned into A-fib with RVR 3/14/24, Cardizem gtt initiated, patient's blood pressure later dropped and she was transferred to step down floor. Patient later converted to NSR and cardizem gtt was stopped. Patient returned to A-fib with RVR 3/15/24 with plans for cardioversion but then converted out again. Cardiology said patient was okay for discharge to rehab but patient would require a return to the acute side of the hospital if she slipped back into A-fib with RVR and required cardioversion so Physiatry plans to accept the patient Monday if she remains stable over the weekend.  Echo obtained   [id number]=2  Ziac was not restarted at time of admission.  Metoprolol succinate substitute was restarted.  Continue Eliquis    Essential hypertension: History for the past 4 to 5 years.  Patient is on Ziac (Bisprolol and HCTZ).  Ziac was not restarted at time of admission  Continue metoprolol    Left TKR: 3/11/24 under the care of Dr. Beaulieu.  Procedure was completed at [facility]  with spinal and nerve block.  Ortho to evaluate and provide further direction  Inpatient rehab consulted, hopefully will be able to accept tomorrow    Hyponatremia, chronic: Likely secondary to HCTZ.    Trend BMP PRN    Elevated liver enzymes, mild:   Trend PRN    Chief Complaint: Tachycardia and hypotension       Subjective: 78 y.o. female admitted to the hospitalist service for new onset A-fib with RVR. Patient denies chest pain. She denies nausea or vomiting. She endorses BM.    Medications:    Infusion Medications    sodium chloride       Scheduled Medications    flecainide  50 mg Oral BID    metoprolol succinate  25 mg Oral Daily    magnesium oxide  400 mg Oral 
    Hospitalist Progress Note    Patient:  Kirstin Estrada      Unit/Bed:3B-37/037-A    YOB: 1945    MRN: [id number]       Acct: [id number]     PCP: Evelio Hennessy MD    Date of Admission: 3/11/2024    Assessment/Plan:    New onset A-fib with RVR:   Cardiology consulted  Returned into A-fib with RVR 3/14/24, Cardizem gtt initiated, patient's blood pressure later dropped and she was transferred to step down floor. Patient later converted to NSR and cardizem gtt was stopped. Patient returned to A-fib with RVR 3/15/24 with plans for cardioversion but then converted out again. Cardiology said patient was okay for discharge to rehab but patient would require a return to the acute side of the hospital if she slipped back into A-fib with RVR and required cardioversion so Physiatry plans to accept the patient Monday if she remains stable over the weekend.  Echo obtained   [id number]=0  Ziac was not restarted at time of admission.  Metoprolol succinate substitute was restarted.  Continue Eliquis    Essential hypertension: History for the past 4 to 5 years.  Patient is on Ziac (Bisprolol and HCTZ).  Ziac was not restarted at time of admission  Continue metoprolol    Left TKR: 3/11/24 under the care of Dr. Beaulieu.  Procedure was completed at [facility]  with spinal and nerve block.  Ortho to evaluate and provide further direction  Inpatient rehab consulted, hopefully will be able to accept tomorrow    Hyponatremia, chronic: Likely secondary to HCTZ.    Trend BMP PRN    Elevated liver enzymes, mild:   Trend PRN    Chief Complaint: Tachycardia and hypotension       Subjective: 78 y.o. female admitted to the hospitalist service for new onset A-fib with RVR. Patient went back into A-fib with RVR this morning and was started on Cardizem. Plan was for patient to be cardioverted at 11 AM however patient converted back to NSR and procedure was aborted. Physiatry to potentially accept patient Monday. Patient seen while in 
    Hospitalist Progress Note    Patient:  Kirstin Estrada      Unit/Bed:8A-12/012-A    YOB: 1945    MRN: [id number]       Acct: [id number]     PCP: Evelio Hennessy MD    Date of Admission: 3/11/2024    Assessment/Plan:    New onset A-fib with RVR:   Cardiology consulted  Returned into A-fib with RVR this morning, Cardizem gtt initiated  Echo obtained   [id number]=4  Ziac was not restarted at time of admission.  Metoprolol succinate substitute was restarted.  Lovenox 1 mg/kg twice daily dosing will be started, awaiting Ortho recommendations regarding when to start DOAC.    Essential hypertension: History for the past 4 to 5 years.  Patient is on Ziac (Bisprolol and HCTZ).  Ziac was not restarted at time of admission  Continue metoprolol    Left TKR: 3/11/24 under the care of Dr. Beaulieu.  Procedure was completed at [facility]  with spinal and nerve block.  Ortho to evaluate and provide further direction    Hyponatremia, chronic: Likely secondary to HCTZ.    Trend BMP PRN    Elevated liver enzymes, mild:   Trend PRN    Chief Complaint: Tachycardia and hypotension       Subjective: 78 y.o. female admitted to the hospitalist service for new onset A-fib with RVR. Cardiology to see today. Patient reports pain in her left leg/knee after working with therapy yesterday. She denies chest pain. She denies nausea or vomiting.    Medications:    Infusion Medications    dilTIAZem 125 mg in sodium chloride 0.9 % 125 mL infusion      sodium chloride       Scheduled Medications    apixaban  5 mg Oral BID    aspirin  162 mg Oral Daily    multivitamin  1 tablet Oral Daily    sodium chloride flush  5-40 mL IntraVENous 2 times per day    metoprolol succinate  100 mg Oral Daily     PRN Meds: sodium chloride flush, sodium chloride, ondansetron **OR** ondansetron, polyethylene glycol, acetaminophen **OR** acetaminophen, potassium chloride **OR** potassium alternative oral replacement **OR** potassium chloride, magnesium sulfate, 
   03/18/24 1523   Encounter Summary   Encounter Overview/Reason  Spiritual/Emotional Needs   Service Provided For: Patient   Referral/Consult From: Rounding   Support System Family members   Last Encounter  03/18/24   Complexity of Encounter Moderate   Begin Time 1513   End Time  1523   Total Time Calculated 10 min   Spiritual/Emotional needs   Type Spiritual Support   Assessment/Intervention/Outcome   Assessment Coping   Intervention Nurtured Hope;Prayer (assurance of)/[place];Sustaining Presence/Ministry of presence   Outcome Comfort     Assessment:  In my encounter with the 78 yr old patient, while rounding  the unit 3B,  I provided spiritual care to patient through conversation, I also came to assess the patient's spiritual needs present. The pt was admitted due to A-fib/ atrial fibrillation.     Interventions:  I provided prayer, emotional support and words of comfort.  provided a listening presence and encouraged pt to share their beliefs and how they support them during their hospitalization.     Outcomes:  The patient was encouraged and didn't share any further spiritual needs at this time.     Plan:  Chaplains will follow-up at a later time for assessment of any spiritual care needs present.  
   Physical Medicine & Rehabilitation   Progress Note      Admitting Physician: Joseph Thomas PA-C    Primary Care Provider: Evelio Hennessy MD     Reason for Consult: Assess for rehab needs    History of Present Illness:  Kirstin Estrada is a 78 y.o. female with PMH significant for hypertension, hyperlipidemia, and hyponatremia who was admitted to [facility] on 3/11/2024.  History obtained via: ED documentation, acute care documentation, patient    Patient initially presented to [facility] ED on 3/11/2024 from [facility] for evaluation of new onset A-fib with RVR and hypotension during procedure.  Per report, patient underwent a left total knee replacement by Dr. Salazar at [facility] on 3/11/2024.  Patient reportedly tolerated the procedure well, however, after the patient procedure she was noted to have new onset of A-fib with RVR along with hypotension.  Patient was given beta-blocker and IV fluids and transferred to [facility] for further evaluation management.  On arrival to ED, patient's heart rate noted to be in the 130s.  Patient was hypotensive.  EKG reportedly revealed a sinus tachycardia.  Patient was admitted for further evaluation and management.    On admission Ortho and cardiology were consulted.  Per Ortho, patient is WBAT to left lower extremity.  Dressing change on POD 2.  Scheduled to follow-up with Dr. Salazar in 2 weeks.  Cardiology evaluated patient.  Recommend continuing metoprolol 100 mg daily.  Patient noted to have an elevated BNP which was thought to likely be related to A-fib.  Recommending a 30-day event monitor to determine burden and need for chronic OAC.    On 3/13/2024, patient was noted to be back in A-fib with RVR.  Rate was improved with IV metoprolol.  Due to recurrence, patient started on Cardizem gtt. for now with adjustments of beta-blocker dosing.  Considering DCCV if she remains uncontrolled.  Cardiology change anticoagulation to Eliquis 5 mg twice daily.    On day of consultation, 
   Physical Medicine & Rehabilitation   Progress Note    Chief Complaint: Assess for rehab needs    History of Present Illness:  Kirstin Estrada is a 78 y.o. female with PMH significant for hypertension, hyperlipidemia, and hyponatremia who was admitted to [facility] on 3/11/2024.  History obtained via: ED documentation, acute care documentation, patient    Patient initially presented to [facility] ED on 3/11/2024 from [facility] for evaluation of new onset A-fib with RVR and hypotension during procedure.  Per report, patient underwent a left total knee replacement by Dr. Salazar at [facility] on 3/11/2024.  Patient reportedly tolerated the procedure well, however, after the patient procedure she was noted to have new onset of A-fib with RVR along with hypotension.  Patient was given beta-blocker and IV fluids and transferred to [facility] for further evaluation management.  On arrival to ED, patient's heart rate noted to be in the 130s.  Patient was hypotensive.  EKG reportedly revealed a sinus tachycardia.  Patient was admitted for further evaluation and management.    On admission Ortho and cardiology were consulted.  Per Ortho, patient is WBAT to left lower extremity.  Dressing change on POD 2.  Scheduled to follow-up with Dr. Salazar in 2 weeks.  Cardiology evaluated patient.  Recommend continuing metoprolol 100 mg daily.  Patient noted to have an elevated BNP which was thought to likely be related to A-fib.  Recommending a 30-day event monitor to determine burden and need for chronic OAC.    On 3/13/2024, patient was noted to be back in A-fib with RVR.  Rate was improved with IV metoprolol.  Due to recurrence, patient started on Cardizem gtt. for now with adjustments of beta-blocker dosing.  Considering DCCV if she remains uncontrolled.  Cardiology change anticoagulation to Eliquis 5 mg twice daily.    On day of consultation, patient is converted to normal sinus rhythm. Cardizem gtt. was discontinued.  Planning for 
  Physician Progress Note      PATIENT:               JUSTYNA LUNA  CSN #:                  [id number]  :                       1945  ADMIT DATE:       3/11/2024 4:32 PM  DISCH DATE:  RESPONDING  PROVIDER #:        Joseph Thomas PA-C          QUERY TEXT:    Patient admitted with new onset atrial fibrillation and has been started on   Eliquis.    If possible, please document in progress notes and discharge summary if you   are evaluating and/or treating any of the following:    The medical record reflects the following:  Risk Factors: new onset atrial fibrillation  Clinical Indicators: per H and P: new onset atrial fibrillation with RVR   during 3/11/24 orthopedic procedure at  [facility]. On arrival at [facility] ER EKG   revealed sinus tachycardia.  Per 3/13/24 IM progress note: Returned into [facility] RVR this morning, [id number] = 4.  Treatment: Eliquis started  3/13/24, per cardiology plan to continue Eliquis   for next month while event monitor completed.    Ana Duffy, MSN, RN, CCDS, CRCR  Clinical   .  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    Query created by: Ana Duffy on 3/13/2024 3:37 PM      Electronically signed by:  Joseph Thomas PA-C 3/13/2024 3:42 PM          
 [facility]  INPATIENT PHYSICAL THERAPY  DAILY NOTE  STRZ CCU-STEPDOWN 3B - 3B-37/037-A    Time In: 0750  Time Out: 0820  Timed Code Treatment Minutes: 30 Minutes  Minutes: 30          Date: 3/18/2024  Patient Name: Kirstin Estrada,  Gender:  female        MRN: [id number]  : 1945  (78 y.o.)     Referring Practitioner: Rodrigo Granado PA-C  Diagnosis: New onset atrial fibrillation  Additional Pertinent Hx: The patient is a pleasant 78-year-old female, who was a known patient of ours.  She had undergone a left total knee replacement on 2024.  The patient from an orthopedic standpoint was uncomplicated, but after the completion of the procedure, the patient had gone into new onset atrial fibrillation with tachycardia as well as hypotension.  The patient was transferred then to [facility] for further care.  The patient upon arrival to [facility] Emergency Department had converted back to normal sinus rhythm.  Pt to wear 30 day event monitor at [place].  Pt received femoral block.     Prior Level of Function:  Lives With: Alone  Type of Home: House  Home Layout: One level  Home Access: Stairs to enter without rails  Entrance Stairs - Number of Steps: 1  Home Equipment: Cane, Walker, standard   Bathroom Shower/Tub: Walk-in shower (reports she has to step up and then down into shower)    Ambulation Assistance: Independent  Transfer Assistance: Independent  Active : Yes  Additional Comments: Pt fully I prior to admission; son is close by to assist    Restrictions/Precautions:  Restrictions/Precautions: Weight Bearing, Fall Risk  Left Lower Extremity Weight Bearing: Weight Bearing As Tolerated     SUBJECTIVE: Pt in bed upon arrival and is agreeable to therapy. Pt requested to use the restroom at the beginning of the session.    PAIN: 2/10 without activity: Pt reports that the pain is present in her L knee. No increase in pain noted .    Vitals: Heart Rate: 87 
 [facility]  INPATIENT PHYSICAL THERAPY  DAILY NOTE  STRZ CCU-STEPDOWN 3B - 3B-37/037-A      Time In: 0932  Time Out: 1010  Timed Code Treatment Minutes: 38 Minutes  Minutes: 38          Date: 3/19/2024  Patient Name: Kirstin Estrada,  Gender:  female        MRN: [id number]  : 1945  (78 y.o.)     Referring Practitioner: Rodrigo Granado PA-C  Diagnosis: New onset atrial fibrillation  Additional Pertinent Hx: The patient is a pleasant 78-year-old female, who was a known patient of ours.  She had undergone a left total knee replacement on 2024.  The patient from an orthopedic standpoint was uncomplicated, but after the completion of the procedure, the patient had gone into new onset atrial fibrillation with tachycardia as well as hypotension.  The patient was transferred then to [facility] for further care.  The patient upon arrival to [facility] Emergency Department had converted back to normal sinus rhythm.  Pt to wear 30 day event monitor at [place].  Pt received femoral block.     Prior Level of Function:  Lives With: Alone  Type of Home: House  Home Layout: One level  Home Access: Stairs to enter without rails  Entrance Stairs - Number of Steps: 1  Home Equipment: Cane, Walker, standard   Bathroom Shower/Tub: Walk-in shower (reports she has to step up and then down into shower)    Ambulation Assistance: Independent  Transfer Assistance: Independent  Active : Yes  Additional Comments: Pt fully I prior to admission; son is close by to assist    Restrictions/Precautions:  Restrictions/Precautions: Weight Bearing, Fall Risk  Left Lower Extremity Weight Bearing: Weight Bearing As Tolerated       SUBJECTIVE: pt agreeable for therapy she reported that she was a little tired after walking to and from bathroom     PAIN: at left knee did place ice to knee at end of session     Vitals: Heart Rate:      OBJECTIVE:  Bed Mobility:  Supine to Sit: Stand By Assistance  Sit to 
 [facility]  INPATIENT PHYSICAL THERAPY  DAILY NOTE  STRZ CCU-STEPDOWN 3B - 3B-37/037-A      Time In: 1140  Time Out: 1219  Timed Code Treatment Minutes: 39 Minutes  Minutes: 39          Date: 3/15/2024  Patient Name: Kirstin Estrada,  Gender:  female        MRN: [id number]  : 1945  (78 y.o.)     Referring Practitioner: Rodrigo Granado PA-C  Diagnosis: New onset atrial fibrillation  Additional Pertinent Hx: The patient is a pleasant 78-year-old female, who was a known patient of ours.  She had undergone a left total knee replacement on 2024.  The patient from an orthopedic standpoint was uncomplicated, but after the completion of the procedure, the patient had gone into new onset atrial fibrillation with tachycardia as well as hypotension.  The patient was transferred then to [facility] for further care.  The patient upon arrival to [facility] Emergency Department had converted back to normal sinus rhythm.  Pt to wear 30 day event monitor at [place].  Pt received femoral block.     Prior Level of Function:  Lives With: Alone  Type of Home: House  Home Layout: One level  Home Access: Stairs to enter without rails  Entrance Stairs - Number of Steps: 1  Home Equipment: Cane, Walker, standard   Bathroom Shower/Tub: Walk-in shower (reports she has to step up and then down into shower)    Ambulation Assistance: Independent  Transfer Assistance: Independent  Active : Yes  Additional Comments: Pt fully I prior to admission; son is close by to assist    Restrictions/Precautions:  Restrictions/Precautions: Weight Bearing, Fall Risk  Left Lower Extremity Weight Bearing: Weight Bearing As Tolerated       SUBJECTIVE: pt in bed on arrival and agreeable for therapy she did ask to return to bed at end of session as well     PAIN: at left knee w/ mobility but no number given     Vitals: Heart Rate:     OBJECTIVE:  Bed Mobility:  Supine to Sit: Contact Guard Assistance, with head of 
 [facility]  INPATIENT PHYSICAL THERAPY  DAILY NOTE  STRZ CCU-STEPDOWN 3B - 3B-37/037-A      Time In: 1255  Time Out: 1334  Timed Code Treatment Minutes: 39 Minutes  Minutes: 39          Date: 3/14/2024  Patient Name: Kirstin Estrada,  Gender:  female        MRN: [id number]  : 1945  (78 y.o.)     Referring Practitioner: Rodrigo Granado PA-C  Diagnosis: New onset atrial fibrillation  Additional Pertinent Hx: The patient is a pleasant 78-year-old female, who was a known patient of ours.  She had undergone a left total knee replacement on 2024.  The patient from an orthopedic standpoint was uncomplicated, but after the completion of the procedure, the patient had gone into new onset atrial fibrillation with tachycardia as well as hypotension.  The patient was transferred then to [facility] for further care.  The patient upon arrival to [facility] Emergency Department had converted back to normal sinus rhythm.  Pt to wear 30 day event monitor at [place].  Pt received femoral block.     Prior Level of Function:  Lives With: Alone  Type of Home: House  Home Layout: One level  Home Access: Stairs to enter without rails  Entrance Stairs - Number of Steps: 1  Home Equipment: Cane, Walker, standard        Ambulation Assistance: Independent  Transfer Assistance: Independent  Active : Yes  Additional Comments: Pt fully I prior to admission; son is close by to assist    Restrictions/Precautions:  Restrictions/Precautions: Weight Bearing, Fall Risk  Left Lower Extremity Weight Bearing: Weight Bearing As Tolerated       SUBJECTIVE: attempted pt earlier this am pt up in chair and reported that she had just gone for a walk, this afternoon pt agreeable for therapy she had asked about getting the bandage off her left LE as her dressing hadn't been changed since sx and nursing aware and working on this, also asked for OT orders earlier this am     PAIN: 10: at left knee w/ ex and wbing 
2 Days S/P Left TKR  Afebrile  VSS  Mild pain left knee  Dressing dry and intact  DF PF intact  Sensations intact distally  Continue PT/OT ROM, strengthening LLE  WBAT LLE  Continue DVT prophylaxis   
[facility]  PHYSICAL THERAPY MISSED TREATMENT NOTE  STRZ MED SURG 8AB    Date: 3/13/2024  Patient Name: Kirstin Estrada        MRN: [id number]   : 1945  (78 y.o.)  Gender: female   Referring Practitioner: Rodrigo Granado PA-C  Diagnosis: New onset atrial fibrillation         REASON FOR MISSED TREATMENT:  Hold treatment per nursing request.  On first attempt earlier this AM, pt declines PT due to pain.  RN hold on second attempt at ~1030 due to pt in afib with RVR, HR in 140's-150's.  Will check back this afternoon.  
[facility]  Acute Inpatient Rehab Preadmission Assessment    Patient Name: Kirstin Estrada        Ethnicity:Not of , /a, or Indonesian origin  Race:White  MRN: [id number]    : 1945  (78 y.o.)  Gender: female     Admitted from:[facility]  Initial Assessment    Date of admission to the hospital: 3/11/2024  4:32 PM  Date patient eligible for admission:3/19/2024    Primary Diagnosis: Debility secondary to TKA and new onset a-fib       Did patient have surgery?  yes - Total knee replacement 3/11/2024 Dr. Salazar at [facility]    Physicians: Segun Chapman MD, Dr. Crespo, Dr. Liriano, Dr. Siu, Dr. Romero     Risk for clinical complications/co-morbidities: History reviewed. No pertinent past medical history.    Financial Information  Primary insurance: Medicare    Secondary Insurance:   Aet Senior Medicare Supplement    Has the patient had two or more falls in the past year or any fall with injury in the past year?   no    Did the patient have major surgery during the 100 days prior to admission?  yes    Precautions: Restrictions/Precautions: Weight Bearing, Fall Risk  Left Lower Extremity Weight Bearing: Weight Bearing As Tolerated      Isolation Precautions: None       Physiatrist:  Dr. Crespo    Patients Occupation: Retired  Reviewed Lab and Diagnostic reports from Current Admission: Yes    Patients Prior Functional  Level: Prior Function  Ambulation Assistance: Independent  Transfer Assistance: Independent  Additional Comments: Pt fully I prior to admission; son is close by to assist    Current functional status for upper extremity ADL’s: Supervision upper body bathing and dressing.     Current functional status for lower extremity ADL’s: Minimal assistance lower body bathing and dressing.     Current functional status for bed, chair, wheelchair transfers: Sit to Stand:  Stand By Assistance, X 1, with increased time for completion.    Stand to Sit: Stand By Assistance, X 
[facility]  INPATIENT REHABILITATION    Date of Admission: 3/11/2024  4:32 PM    Patient Name: Kirstin Estrada      MRN: [id number]    : 1945  (78 y.o.)  Gender: female     Payor Source: Payor: MEDICARE / Plan: MEDICARE PART A AND B / Product Type: *No Product type* /   Secondary Payor Source:  AETNA    Isolation Status: No active isolations    Inpatient Rehabilitation Admission Status: Planning admission to acute inpatient rehabilitation.     Spoke with patient and Primary RN Carolyne, both verbalized understanding of planned discharge/readmit to [address].     
Called this a.m. spoke with bedside nurse to get a sense of the events of the morning.  Will continue to follow for rehab referral consideration.  Will discuss with Dr. Crespo today rehab admission timeframe.      Update:  Spoke with Dr. Crespo regarding rehab admission timeline planning to re-evaluate patient on Monday 3/18.    
Cardiology Progress Note      Patient:  Kirstin Estrada  YOB: 1945  MRN: [id number]   Acct: [id number]  Admit Date:  3/11/2024  Primary Cardiologist: none  Seen by dr. Romero    Per prior cardiology consult note-  CHIEF COMPLAINT: new onset atrial fibrillation from O     PMH: HTN, HLD, hyponatremia     HPI: This is a pleasant 78 y.o. female presents with new onset atrial fibrillation w/ RVR and hypotension during left total knee replacement w/ Dr. Beaulieu at [facility] 3/11/24. HR was noted to be 120-140s w/ SBP . Pt was given IVP lopressor and IV fluids post procedure.     Patient denies fever, chills, chest pain, shortness of breath, Isaac pain, nausea, vomiting, diarrhea, dysuria, hematuria, constipation, diarrhea.  Patient did state there was trace blood in her urine last month and was given ciprofloxacin by her primary care doctor for suspected UTI at that time.     Pt was atrial fibrillation w/ RVR on arrival to ED w/ . Pt received liter fluid bolus and self converted to NSR w/ HR 96.      BMP shows mild hyponatremia (chronic) and mild NAGMA. BNP 1012. Troponin 12. HFP shows elevated ALT and AST. TSH WNL. Etoh negative. CBC shows mild leukocytosis at 13.8. UDS positive for benzos and opiates. UA shows trace blood but not bacteria and negative nitrites/leukocytes.     Subjective (Events in last 24 hours):   Pt in bed   In AFB RVR hr 140 - slight symptomatic   BP stable     On IV cardizem 5 mg /hr    Discussed ROSSI / CV -- she's agreeable       Objective:   BP (!) 93/43   Pulse 90   Temp 97.8 °F (36.6 °C) (Oral)   Resp 15   Ht 1.6 m (5' 3\")   Wt 63.3 kg (139 lb 8.8 oz)   SpO2 100%   BMI 24.72 kg/m²        TELEMETRY: AFB RVR     Physical Exam:  General Appearance: alert and oriented to person, place and time, in no acute distress  Cardiovascular: irregular rhythm, normal S1 and S2, no murmurs, rubs, clicks, or gallops, distal pulses intact,   Pulmonary/Chest: clear - diminished 
Continue to follow patient clinical progress for IPR admission.    
Discussed discharge plan with patient. Instructed patient about follow up appointments. Patient denies any additional questions. Patient was discharged from  with all belongings including knee brace from O. No distress noted. Patient discharged to 8K, inpatient rehab with 30 day event monitor on. Taken over to 8K per transporter in wheelchair.   
Dressing change completed as instructed by Vannesa Wallace for ortho.  This RN was instructed to cleanse with betadine swabs, gauze, abd, ace wrap to change daily.    
Left knee dressing unwrapped at request of patient. There are no wound/dressing orders at this time. Incision is closed with staples. No bleeding, swelling or weeping noted. Non-adherent pads replaced on top of incision and leg re-wrapped with ACE bandage.   
[facility]  INPATIENT PHYSICAL THERAPY  EVALUATION  STRZ MED SURG 8AB - 8A-12/012-A    Time In: 1409  Time Out: 1450  Timed Code Treatment Minutes: 31 Minutes  Minutes: 41          Date: 3/12/2024  Patient Name: Kirstin Estrada,  Gender:  female        MRN: [id number]  : 1945  (78 y.o.)      Referring Practitioner: Rodrigo Granado PA-C  Diagnosis: New onset atrial fibrillation  Additional Pertinent Hx: The patient is a pleasant 78-year-old female, who was a known patient of ours.  She had undergone a left total knee replacement on 2024.  The patient from an orthopedic standpoint was uncomplicated, but after the completion of the procedure, the patient had gone into new onset atrial fibrillation with tachycardia as well as hypotension.  The patient was transferred then to [facility] for further care.  The patient upon arrival to [facility] Emergency Department had converted back to normal sinus rhythm.  Pt to wear 30 day event monitor at NH.  Pt received femoral block.     Restrictions/Precautions:  Restrictions/Precautions: Weight Bearing, Fall Risk  Left Lower Extremity Weight Bearing: Weight Bearing As Tolerated    Subjective:  Chart Reviewed: Yes  Patient assessed for rehabilitation services?: Yes  Family / Caregiver Present: No  Subjective: RN approved session, pt is supine in bed, agreeable to PT.    General:  Overall Orientation Status: Within Functional Limits  Vision: Within Functional Limits  Hearing: Within functional limits       Pain: c/o pain when up, does not quantify    Vitals: Vitals not assessed per clinical judgement, see nursing flowsheet    Social/Functional History:    Lives With: Alone  Type of Home: House  Home Layout: One level  Home Access: Stairs to enter without rails  Entrance Stairs - Number of Steps: 1  Home Equipment: Cane, Walker, standard      Ambulation Assistance: Independent  Transfer Assistance: Independent    Active : 
[facility]  INPATIENT OCCUPATIONAL THERAPY  STRZ CCU-STEPDOWN 3B  EVALUATION    Time:   Time In: 0800  Time Out: 0847  Timed Code Treatment Minutes: 38 Minutes  Minutes: 47          Date: 3/15/2024  Patient Name: Kirstin Estrada,   Gender: female      MRN: [id number]  : 1945  (78 y.o.)  Referring Practitioner: Joseph Thomas PA-C  Diagnosis: new onset atrial fibrillation  Additional Pertinent Hx: The patient is a pleasant 78-year-old female, who was a known patient of ours.  She had undergone a left total knee replacement on 2024.  The patient from an orthopedic standpoint was uncomplicated, but after the completion of the procedure, the patient had gone into new onset atrial fibrillation with tachycardia as well as hypotension.  The patient was transferred then to [facility] for further care.  The patient upon arrival to [facility] Emergency Department had converted back to normal sinus rhythm.  Pt to wear 30 day event monitor at CT.  Pt received femoral block.    Restrictions/Precautions:  Restrictions/Precautions: Weight Bearing, Fall Risk  Left Lower Extremity Weight Bearing: Weight Bearing As Tolerated    Subjective  Chart Reviewed: Yes, Orders, Progress Notes, History and Physical  Patient assessed for rehabilitation services?: Yes    Subjective: Nurse approved OT eval. Pt in bed on OT arrival, pt reports she went into A-fib again in the early morning this date. Pt is agreeable to OT eval and reprots she feels better now. A+Ox4.    Pain: Does report L knee pain but does not numerate. Nursing present during session to provide AM pain medication     Vitals:  on telemetry    Social/Functional History:  Lives With: Alone  Type of Home: House  Home Layout: One level  Home Access: Stairs to enter without rails  Entrance Stairs - Number of Steps: 1  Home Equipment: Cane, Walker, standard   Bathroom Shower/Tub: Walk-in shower (reports she has to step up and then down into 
[facility]  INPATIENT REHABILITATION  ADMISSIONS COORDINATOR CONSULT    Referral Type: internal    Patient Name: Kirstin Estrada      MRN: [id number]    : 1945  (78 y.o.)  Gender: female   Race:White (non-)     Payor Source: Payor: MEDICARE / Plan: MEDICARE PART A AND B / Product Type: *No Product type* /   Secondary Payor Source:  AETNA    Isolation Status: No active isolations    Lives With: Alone  Type of Home: House  Home Layout: One level  Home Access: Stairs to enter without rails  Entrance Stairs - Number of Steps: 1     Occupation: Retired  Type of Occupation: RN at [employer]  Additional Comments: Pt fully I prior to admission; son is close by to assist    Disciplines Required upon Admission to Inpatient Rehabilitation: Physical Therapy and Occupational Therapy  Post operative: Yes  Fall: No  Dialysis: No  Diet: ADULT DIET; Regular  Discussed patient with  and PM&R provider:  Await completed therapy evaluations (OT) and completed consultation note from PM&R for further determination of patient needs.     
Patient belongings were all gathered and discharged to 8k inpatient rehab. Patient has no needs at this time.     Roopa Rich [address].   
Patient is alert and oriented times four. Speech is clear and appropriate. Patient is able to follow commands. PERRLA. Hand grasp strong and equal bilaterally. Skin turgor less than 3 seconds. Capillary refill less than 3 seconds. Arm drift negative. IV is capped in the right forearm. No signs or symptoms of infiltration. Right and left pedal push and pull was strong and equal bilaterally. Patient has incision with staples noted on the left knee. Site is free of redness, warmth, or drainage. Mucous membranes are moist and intact. Skin color is appropriate for ethnicity. No numbness or tingling noted. Respiratory rate is regular and unlabored. Lung sounds are clear throughout. Cardiac WNL. Heart rate regular with no murmurs. Abdomen is soft and round. No distention noted. Bowel sounds are active in all four quadrants. Patient has +1 pitting edema in the lower extremities. Patient voices no concerns at this time. Patients bed is in lowest position, wheels locked, and bed alarm on. Call light and all possessions within reach.     Roopa Rich [facility].   
Patient is awake and sitting in the chair. Chair alarm is on. Patient voices no concerns or needs at this time. All possessions and call light within reach.     Roopa PLAZA.   
Patient noted to have a 3 second read of asystole on tele. Patient sitting up in bed no complaints at this time. Cardiology notified.  
Patient voices no needs or concerns at this time. Patient is resting in the chair with chair alarm on. No changes from previous assessment. Call light within reach.     Roopa PLAZA.   
Patient with two (2) two (2) second pauses on telemetry. Writer reduced cardizem to 7.5mg/hr.   
Pharmacy Medication History Note      List of current medications patient is taking is complete.    Source of information: patient, dispense report    Changes made to medication list:  Medications removed (include reason, ex. therapy complete or physician discontinued):  none    Medications added/doses adjusted:  none    Other notes (ex. Recent course of antibiotics, Coumadin dosing):  Denies use of other OTC or herbal medications.    Allergies reviewed    Electronically signed by Katelyn Alex on 3/14/2024 at 10:49 AM                                                     
Physical Therapy   [facility]  INPATIENT PHYSICAL THERAPY  DAILY NOTE  STRZ CCU-STEPDOWN 3B - 3B-37/037-A    Time In: 1349  Time Out: 1420  Timed Code Treatment Minutes: 31 Minutes  Minutes: 31          Date: 3/17/2024  Patient Name: Kirstin Estrada,  Gender:  female        MRN: [id number]  : 1945  (78 y.o.)     Referring Practitioner: Rodrigo Granado PA-C  Diagnosis: New onset atrial fibrillation  Additional Pertinent Hx: The patient is a pleasant 78-year-old female, who was a known patient of ours.  She had undergone a left total knee replacement on 2024.  The patient from an orthopedic standpoint was uncomplicated, but after the completion of the procedure, the patient had gone into new onset atrial fibrillation with tachycardia as well as hypotension.  The patient was transferred then to [facility] for further care.  The patient upon arrival to [facility] Emergency Department had converted back to normal sinus rhythm.  Pt to wear 30 day event monitor at [place].  Pt received femoral block.     Prior Level of Function:  Lives With: Alone  Type of Home: House  Home Layout: One level  Home Access: Stairs to enter without rails  Entrance Stairs - Number of Steps: 1  Home Equipment: Cane, Walker, standard   Bathroom Shower/Tub: Walk-in shower (reports she has to step up and then down into shower)    Ambulation Assistance: Independent  Transfer Assistance: Independent  Active : Yes  Additional Comments: Pt fully I prior to admission; son is close by to assist    Restrictions/Precautions:  Restrictions/Precautions: Weight Bearing, Fall Risk  Left Lower Extremity Weight Bearing: Weight Bearing As Tolerated     SUBJECTIVE: RN Eduar and patient are agreeable for PT this afternoon. Patient plans on having IPR stay here at [facility] and states that Dr. Crespo has visited with patient x 2. She c/o Lt calf pain. Lt calf is not hot or swollen, but painful with dorsiflexion. Most 
Planning admission to [facility] today pending medical clearance.  FAUSTO Huynh updated, patient is a discharge/readmit to [address] [place] [address] 8k15; call report to [phone].    
Prayer and encouragement    03/14/24 1444   Encounter Summary   Service Provided For: Patient and family together   Referral/Consult From: Rounding   Support System Family members   Last Encounter  03/14/24   Complexity of Encounter Low   Begin Time 1055   End Time  1100   Total Time Calculated 5 min   Spiritual/Emotional needs   Type Spiritual Support   Assessment/Intervention/Outcome   Assessment Peaceful   Intervention Empowerment   Outcome Encouraged;Engaged in conversation       
Pt was encouraged and anointed.    03/13/24 3444   Encounter Summary   Service Provided For: Patient   Referral/Consult From: [facility]   Support System Children   Last Encounter  03/13/24  (Anointed)   Complexity of Encounter Low   Spiritual/Emotional needs   Type Spiritual Support   Rituals, Rites and Sacraments   Type Anointing       
Rounded on unit, spoke with patient explained rehab philosophy briefly.    
Rounded on unit, spoke with patient regarding rehab referral and hopeful for admission to [facility] tomorrow 3/19/2024.    
[facility]  STRZ CCU-STEPDOWN 3B  Occupational Therapy  Daily Note  Time:   Time In: 1345  Time Out: 1423  Timed Code Treatment Minutes: 38 Minutes  Minutes: 38          Date: 3/18/2024  Patient Name: Kirstin Estrada,   Gender: female      Room: -37/037-A  MRN: [id number]  : 1945  (78 y.o.)  Referring Practitioner: Joseph Thomas PA-C  Diagnosis: new onset atrial fibrillation  Additional Pertinent Hx: The patient is a pleasant 78-year-old female, who was a known patient of ours.  She had undergone a left total knee replacement on 2024.  The patient from an orthopedic standpoint was uncomplicated, but after the completion of the procedure, the patient had gone into new onset atrial fibrillation with tachycardia as well as hypotension.  The patient was transferred then to [facility] for further care.  The patient upon arrival to [facility] Emergency Department had converted back to normal sinus rhythm.  Pt to wear 30 day event monitor at PA.  Pt received femoral block.    Restrictions/Precautions:  Restrictions/Precautions: Weight Bearing, Fall Risk  Left Lower Extremity Weight Bearing: Weight Bearing As Tolerated     Social/Functional History:  Lives With: Alone  Type of Home: House  Home Layout: One level  Home Access: Stairs to enter without rails  Entrance Stairs - Number of Steps: 1  Home Equipment: Cane, Walker, standard   Bathroom Shower/Tub: Walk-in shower (reports she has to step up and then down into shower)       Ambulation Assistance: Independent  Transfer Assistance: Independent    Active : Yes  Occupation: Retired  Type of Occupation: RN at [employer]  Additional Comments: Pt fully I prior to admission; son is close by to assist    SUBJECTIVE: Patient seated upright supported in bed upon arrival with ice on R wrist d/t IV infiltration.  Patient agreeable to therapy, hopeful to d/c to IPR tomorrow.  Patient pleasant and cooperative    PAIN: no numerical 
Spoke with Rodrigo PARK with Orthopaedic Surgery about patients knee dressing. ACE wrap can come off, daily dressing changes with betadine and ABD over. Finish with NATALIE hose.   
[facility]  PHYSICAL THERAPY MISSED TREATMENT NOTE  STRZ MED SURG 8AB    Date: 3/13/2024  Patient Name: Kirstin Estrada        MRN: [id number]   : 1945  (78 y.o.)  Gender: female   Referring Practitioner: Rodrigo Granado PA-C  Diagnosis: New onset atrial fibrillation         REASON FOR MISSED TREATMENT:  Hold treatment per nursing request.  On third attempt, pt continues to be in afib with RVR.  Will hold.  
Updated Shahla VAZQUEZ via perfect serve (x2) to inform her that cardizem has been decreased from 10mg to 2.5mg in past hour and that patient's heart rate now in the 50's and last pressure 70/40. Awaiting reply.   
Awareness    ADL:   Toileting: Supervision.  Able to complete all care   Toilet Transfer: Stand By Assistance. To STS using grab bar on R side  .    IADL:   Not Tested    BALANCE:  Standing Balance: Contact Guard Assistance. At RW    BED MOBILITY:  Supine to Sit: Stand By Assistance able to complete     TRANSFERS:  Sit to Stand:  Stand By Assistance. From EOB and STS   Stand to Sit: Stand By Assistance. To STS and recliner     FUNCTIONAL MOBILITY:  Assistive Device: Rolling Walker  Assist Level:  Stand By Assistance.   Distance: To and from bathroom and into hallway of unit   No LOB      ADDITIONAL ACTIVITIES:  .Patient completed BUE strengthening exercises with skilled education on HEP: completed x15 reps x1 set with a moderate resistive band  in all joints and all planes in order to improve UE strength and activity tolerance required for BADL routine and toilet / shower transfers. Patient tolerated in sitting , requiring short  rest breaks. Patient also required min verbal  cues for technique.            Modified Waldorf Scale:  Not Applicable    ASSESSMENT:     Activity Tolerance:  Patient tolerance of  treatment: Good treatment tolerance      Discharge Recommendations: Inpatient Rehabilitation  Equipment Recommendations: Equipment Needed: Yes  Other: shower chair and possibly RW  Plan: Times Per Week: 6x  Times Per Day: Once a day  Current Treatment Recommendations: Strengthening, Balance training, Functional mobility training, Endurance training, Neuromuscular re-education, Patient/Caregiver education & training, Safety education & training, Self-Care / ADL    Education:  Learners: Patient  ADL's, Home Exercise Program, and Importance of Increasing Activity    Goals  Short Term Goals  Time Frame for Short Term Goals: until discharge  Short Term Goal 1: Pt will navigate to/from bathroom and HH distances with SBA and good safety awareness and walker management skills to improve indep with ADL access.  Short Term 
estimated EF of 55 - 60%. Left ventricle size is normal. Normal wall thickness. Normal wall motion.    Mitral Valve: Mild regurgitation.    Tricuspid Valve: Mild regurgitation. The estimated RVSP is 37 mmHg.    IVC/SVC: IVC diameter is less than or equal to 21 mm and decreases greater than 50% during inspiration; therefore the estimated right atrial pressure is normal (~3 mmHg). IVC size is normal.    Image quality is technically difficult. Procedure performed with the patient in a supine position.     Comparison Study Information    Prior Study    There is no prior study available for comparison     Echo Findings    Left Ventricle Normal left ventricular systolic function with a visually estimated EF of 55 - 60%. Left ventricle size is normal. Normal wall thickness. Normal wall motion. Indeterminate diastolic function.   Left Atrium Left atrium size is normal.   Right Ventricle Right ventricle size is normal. Normal systolic function.   Right Atrium Right atrium size is normal.   Aortic Valve Valve structure is normal. Trace regurgitation. No stenosis.   Mitral Valve Valve structure is normal. Mild regurgitation. No stenosis noted.   Tricuspid Valve Valve structure is normal. Mild regurgitation. The estimated RVSP is 37 mmHg. No stenosis noted.   Pulmonic Valve Valve structure is normal. No regurgitation. No stenosis noted.   Aorta Normal sized aortic root and ascending aorta.   IVC/Hepatic Veins IVC diameter is less than or equal to 21 mm and decreases greater than 50% during inspiration; therefore the estimated right atrial pressure is normal (~3 mmHg). IVC size is normal.   Pericardium No pericardial effusion.     Study Details    Image quality: technically difficult. Procedure performed with the patient in a supine position. No contrast was given.     Volumes and Function (Range)     ESV ESV Index   LA Biplane 23 mL  (22 - 52)       14 ml/m2  (16 - 34) Abnormal          LA A4C MOD 25 mL  (22 - 52)       15 
  Component Value Date/Time    MG 1.9 03/15/2024 06:36 AM       PT/INR:    Lab Results   Component Value Date/Time    INR 0.95 03/12/2024 06:56 AM       HgBA1c:    Lab Results   Component Value Date/Time    LABA1C 5.5 03/04/2022 08:47 AM       FLP:    Lab Results   Component Value Date/Time    TRIG 54 03/12/2024 06:56 AM    HDL 53 03/12/2024 06:56 AM    LDLCALC 90 03/12/2024 06:56 AM       TSH:    Lab Results   Component Value Date/Time    TSH 3.620 03/11/2024 04:57 PM         Assessment:    Sp LT total knee replacement 3/1/24    New AFB RVR --> converted to SR with VF 3/12   - back to AFB --> converted with IV cdz - increased BB -- back to AFB    - back to AFB ---> Plan ROSSI / CV  / flecainide - converted to SR    - back to AFB - IV amiod - SR today    -- discussed current medications regimen and OP ablation         Chronic hyponatremia       Plan:  24 hrs iv amiodarone then change to po amiodarone   EKG am   Stress as OP once recovered from Rehab / surgery   Ok for IP Rehab transfer tomorrow         Electronically signed by LETICIA Ricci CNP on 3/18/2024 at 10:20 AM            
  Ascending Aorta Index 1.45 cm/m2               Diastolic Filling/Shunts      Diastolic Filling   MV E Velocity 1.03 m/s         MV A Velocity 0.71 m/s         MV E/A 1.45         MV E Wave Deceleration Time 201 ms         LV IVRT 56 ms         TV E Wave Velocity 0.7 m/s                  Lab Data:    Cardiac Enzymes:  No results for input(s): \"CKTOTAL\", \"CKMB\", \"CKMBINDEX\", \"TROPONINI\" in the last 72 hours.    CBC:   Lab Results   Component Value Date/Time    WBC 10.1 03/12/2024 06:56 AM    RBC 3.59 03/12/2024 06:56 AM    RBC 4.35 11/21/2018 09:08 AM    HGB 11.3 03/12/2024 06:56 AM    HCT 35.0 03/12/2024 06:56 AM     03/12/2024 06:56 AM       CMP:    Lab Results   Component Value Date/Time     03/12/2024 06:56 AM    K 4.5 03/12/2024 06:56 AM     03/12/2024 06:56 AM    CO2 19 03/12/2024 06:56 AM    BUN 20 03/12/2024 06:56 AM    CREATININE 0.7 03/12/2024 06:56 AM    LABGLOM >60 03/12/2024 06:56 AM    GLUCOSE 115 03/12/2024 06:56 AM    GLUCOSE 110 11/21/2018 09:08 AM    CALCIUM 8.7 03/12/2024 06:56 AM       Hepatic Function Panel:    Lab Results   Component Value Date/Time    ALKPHOS 73 03/12/2024 06:56 AM    ALT 70 03/12/2024 06:56 AM    AST 65 03/12/2024 06:56 AM    PROT 5.7 03/12/2024 06:56 AM    BILITOT 0.3 03/12/2024 06:56 AM    BILIDIR 0.3 03/11/2024 04:57 PM    LABALBU 3.3 03/12/2024 06:56 AM       Magnesium:    Lab Results   Component Value Date/Time    MG 2.0 03/11/2024 04:57 PM       PT/INR:    Lab Results   Component Value Date/Time    INR 0.95 03/12/2024 06:56 AM       HgBA1c:    Lab Results   Component Value Date/Time    LABA1C 5.5 03/04/2022 08:47 AM       FLP:    Lab Results   Component Value Date/Time    TRIG 54 03/12/2024 06:56 AM    HDL 53 03/12/2024 06:56 AM    LDLCALC 90 03/12/2024 06:56 AM       TSH:    Lab Results   Component Value Date/Time    TSH 3.620 03/11/2024 04:57 PM         Assessment:  New onset AF with RVR  Initially converted spontaenously. Now with recurrence of RVR

## 2024-03-19 NOTE — DISCHARGE SUMMARY
Hospital Medicine Discharge Summary      Patient Identification:   Kirstin Estrada   : 1945  MRN: [id number]   Account: [id number]      Patient's PCP: Evelio Hennessy MD    Admit Date: 3/11/2024     Discharge Date:   3/19/2024    Admitting Physician: No admitting provider for patient encounter.     Discharge Physician: Segun Chapman MD     Discharge Diagnoses:  New onset A-fib RVR: Postsurgical. [id number]. Discharged on Toprol-XL 25 mg daily, Amiodarone 200 mg daily, and Eliquis 5 mg BID. Cardiac event monitor placed prior to DC to [facility]. Follow up with Cardiology, may not need long term anticoagulation if low burden now after surgery.    Primary HTN: on Ziac (Bisprolol and HCTZ)- discontinue due to initiation of Toprol-XL. BP low normal. Will hold off on adding another agent. Follow up with PCP after discharge from [facility]. Consider starting on low dose Lisinopril or Norvasc if hypertensive persistently for BP > 130/80.     Total left knee arthroplasty: 3/11/2024 with Dr. Beaulieu. Procedure was completed at [facility]  with spinal and nerve block. LLE calf pain per note on 3/18. Patient already on Eliquis. Knee pain reported but no obvious concerns for DVT per exam.     Chronic hyponatremia: likely due to HCTZ. Was on salt tablets for few days prior to her surgery. Discontinue HCTZ at this time. Monitor Na levels. Encourage good PO intake.     Transaminitis: initially likely due to acute hypotension. Hepatitis panel negative. Now only with hypoalbuminemia, mild 3.3. Continue to trend as needed and follow up outpatient. Could be some hemodilution with IVF therapy. Encourage good PO intake for nutrition.       The patient was seen and examined on day of discharge and this discharge summary is in conjunction with any daily progress note from day of discharge.    Hospital Course:   Kirstin Estrada is a 78 y.o. female admitted to [facility] on 3/11/2024 for A-fib RVR.        Patient admitted from [facility]

## 2024-03-20 LAB
25(OH)D3 SERPL-MCNC: 43 NG/ML (ref 30–100)
ALBUMIN SERPL BCG-MCNC: 3.1 G/DL (ref 3.5–5.1)
ALP SERPL-CCNC: 62 U/L (ref 38–126)
ALT SERPL W/O P-5'-P-CCNC: 23 U/L (ref 11–66)
ANION GAP SERPL CALC-SCNC: 12 MEQ/L (ref 8–16)
AST SERPL-CCNC: 26 U/L (ref 5–40)
BASOPHILS ABSOLUTE: 0.1 THOU/MM3 (ref 0–0.1)
BASOPHILS NFR BLD AUTO: 0.6 %
BILIRUB SERPL-MCNC: 0.5 MG/DL (ref 0.3–1.2)
BUN SERPL-MCNC: 20 MG/DL (ref 7–22)
CALCIUM SERPL-MCNC: 8.6 MG/DL (ref 8.5–10.5)
CHLORIDE SERPL-SCNC: 91 MEQ/L (ref 98–111)
CO2 SERPL-SCNC: 23 MEQ/L (ref 23–33)
CREAT SERPL-MCNC: 0.6 MG/DL (ref 0.4–1.2)
DEPRECATED RDW RBC AUTO: 43 FL (ref 35–45)
EKG ATRIAL RATE: 74 BPM
EKG P AXIS: 64 DEGREES
EKG P-R INTERVAL: 142 MS
EKG Q-T INTERVAL: 286 MS
EKG Q-T INTERVAL: 354 MS
EKG Q-T INTERVAL: 402 MS
EKG QRS DURATION: 74 MS
EKG QRS DURATION: 74 MS
EKG QRS DURATION: 80 MS
EKG QTC CALCULATION (BAZETT): 439 MS
EKG QTC CALCULATION (BAZETT): 446 MS
EKG QTC CALCULATION (BAZETT): 447 MS
EKG R AXIS: 50 DEGREES
EKG R AXIS: 66 DEGREES
EKG R AXIS: 67 DEGREES
EKG T AXIS: -26 DEGREES
EKG T AXIS: 20 DEGREES
EKG T AXIS: 43 DEGREES
EKG VENTRICULAR RATE: 142 BPM
EKG VENTRICULAR RATE: 74 BPM
EKG VENTRICULAR RATE: 96 BPM
EOSINOPHIL NFR BLD AUTO: 2.6 %
EOSINOPHILS ABSOLUTE: 0.2 THOU/MM3 (ref 0–0.4)
ERYTHROCYTE [DISTWIDTH] IN BLOOD BY AUTOMATED COUNT: 12.4 % (ref 11.5–14.5)
GFR SERPL CREATININE-BSD FRML MDRD: > 60 ML/MIN/1.73M2
GLUCOSE SERPL-MCNC: 96 MG/DL (ref 70–108)
HCT VFR BLD AUTO: 33.6 % (ref 37–47)
HGB BLD-MCNC: 10.9 GM/DL (ref 12–16)
IMM GRANULOCYTES # BLD AUTO: 0.05 THOU/MM3 (ref 0–0.07)
IMM GRANULOCYTES NFR BLD AUTO: 0.5 %
LYMPHOCYTES ABSOLUTE: 1.4 THOU/MM3 (ref 1–4.8)
LYMPHOCYTES NFR BLD AUTO: 14.3 %
MCH RBC QN AUTO: 31.3 PG (ref 26–33)
MCHC RBC AUTO-ENTMCNC: 32.4 GM/DL (ref 32.2–35.5)
MCV RBC AUTO: 96.6 FL (ref 81–99)
MONOCYTES ABSOLUTE: 1 THOU/MM3 (ref 0.4–1.3)
MONOCYTES NFR BLD AUTO: 10.7 %
NEUTROPHILS NFR BLD AUTO: 71.3 %
NRBC BLD AUTO-RTO: 0 /100 WBC
PLATELET # BLD AUTO: 393 THOU/MM3 (ref 130–400)
PMV BLD AUTO: 8.6 FL (ref 9.4–12.4)
POTASSIUM SERPL-SCNC: 3.8 MEQ/L (ref 3.5–5.2)
PREALB SERPL-MCNC: 11.4 MG/DL (ref 20–40)
PROT SERPL-MCNC: 5.8 G/DL (ref 6.1–8)
RBC # BLD AUTO: 3.48 MILL/MM3 (ref 4.2–5.4)
SEGMENTED NEUTROPHILS ABSOLUTE COUNT: 6.8 THOU/MM3 (ref 1.8–7.7)
SODIUM SERPL-SCNC: 126 MEQ/L (ref 135–145)
WBC # BLD AUTO: 9.5 THOU/MM3 (ref 4.8–10.8)

## 2024-03-20 PROCEDURE — 93005 ELECTROCARDIOGRAM TRACING: CPT

## 2024-03-20 PROCEDURE — 84134 ASSAY OF PREALBUMIN: CPT

## 2024-03-20 PROCEDURE — 93005 ELECTROCARDIOGRAM TRACING: CPT | Performed by: FAMILY MEDICINE

## 2024-03-20 PROCEDURE — 85025 COMPLETE CBC W/AUTO DIFF WBC: CPT

## 2024-03-20 PROCEDURE — 93010 ELECTROCARDIOGRAM REPORT: CPT | Performed by: INTERNAL MEDICINE

## 2024-03-20 PROCEDURE — 97112 NEUROMUSCULAR REEDUCATION: CPT

## 2024-03-20 PROCEDURE — 6370000000 HC RX 637 (ALT 250 FOR IP)

## 2024-03-20 PROCEDURE — 97110 THERAPEUTIC EXERCISES: CPT

## 2024-03-20 PROCEDURE — 97162 PT EVAL MOD COMPLEX 30 MIN: CPT

## 2024-03-20 PROCEDURE — 80053 COMPREHEN METABOLIC PANEL: CPT

## 2024-03-20 PROCEDURE — 97166 OT EVAL MOD COMPLEX 45 MIN: CPT

## 2024-03-20 PROCEDURE — 97535 SELF CARE MNGMENT TRAINING: CPT

## 2024-03-20 PROCEDURE — 2580000003 HC RX 258: Performed by: STUDENT IN AN ORGANIZED HEALTH CARE EDUCATION/TRAINING PROGRAM

## 2024-03-20 PROCEDURE — 82306 VITAMIN D 25 HYDROXY: CPT

## 2024-03-20 PROCEDURE — 97530 THERAPEUTIC ACTIVITIES: CPT

## 2024-03-20 PROCEDURE — 97116 GAIT TRAINING THERAPY: CPT

## 2024-03-20 PROCEDURE — 1180000000 HC REHAB R&B

## 2024-03-20 PROCEDURE — 36415 COLL VENOUS BLD VENIPUNCTURE: CPT

## 2024-03-20 PROCEDURE — 6370000000 HC RX 637 (ALT 250 FOR IP): Performed by: PHYSICIAN ASSISTANT

## 2024-03-20 PROCEDURE — 6370000000 HC RX 637 (ALT 250 FOR IP): Performed by: STUDENT IN AN ORGANIZED HEALTH CARE EDUCATION/TRAINING PROGRAM

## 2024-03-20 PROCEDURE — 99232 SBSQ HOSP IP/OBS MODERATE 35: CPT | Performed by: STUDENT IN AN ORGANIZED HEALTH CARE EDUCATION/TRAINING PROGRAM

## 2024-03-20 RX ORDER — DILTIAZEM HCL 90 MG
90 TABLET ORAL ONCE
Status: COMPLETED | OUTPATIENT
Start: 2024-03-20 | End: 2024-03-20

## 2024-03-20 RX ORDER — DILTIAZEM HCL 90 MG
90 TABLET ORAL EVERY 6 HOURS SCHEDULED
Status: DISCONTINUED | OUTPATIENT
Start: 2024-03-20 | End: 2024-03-21

## 2024-03-20 RX ORDER — AMIODARONE HYDROCHLORIDE 200 MG/1
200 TABLET ORAL 2 TIMES DAILY
Status: COMPLETED | OUTPATIENT
Start: 2024-03-20 | End: 2024-03-26

## 2024-03-20 RX ADMIN — SODIUM CHLORIDE, PRESERVATIVE FREE 10 ML: 5 INJECTION INTRAVENOUS at 21:49

## 2024-03-20 RX ADMIN — Medication 1 TABLET: at 10:33

## 2024-03-20 RX ADMIN — APIXABAN 5 MG: 5 TABLET, FILM COATED ORAL at 10:33

## 2024-03-20 RX ADMIN — ACETAMINOPHEN 1000 MG: 500 TABLET ORAL at 05:44

## 2024-03-20 RX ADMIN — APIXABAN 5 MG: 5 TABLET, FILM COATED ORAL at 20:51

## 2024-03-20 RX ADMIN — AMIODARONE HYDROCHLORIDE 200 MG: 200 TABLET ORAL at 10:33

## 2024-03-20 RX ADMIN — DILTIAZEM HYDROCHLORIDE 90 MG: 90 TABLET ORAL at 00:43

## 2024-03-20 RX ADMIN — Medication 400 MG: at 10:33

## 2024-03-20 RX ADMIN — METOPROLOL SUCCINATE 25 MG: 25 TABLET, EXTENDED RELEASE ORAL at 13:18

## 2024-03-20 RX ADMIN — TRAMADOL HYDROCHLORIDE 50 MG: 50 TABLET ORAL at 20:51

## 2024-03-20 RX ADMIN — TRAMADOL HYDROCHLORIDE 50 MG: 50 TABLET ORAL at 13:18

## 2024-03-20 RX ADMIN — METOPROLOL TARTRATE 25 MG: 25 TABLET, FILM COATED ORAL at 20:51

## 2024-03-20 RX ADMIN — DILTIAZEM HYDROCHLORIDE 90 MG: 90 TABLET ORAL at 05:39

## 2024-03-20 RX ADMIN — AMIODARONE HYDROCHLORIDE 200 MG: 200 TABLET ORAL at 20:51

## 2024-03-20 RX ADMIN — SODIUM CHLORIDE, PRESERVATIVE FREE 10 ML: 5 INJECTION INTRAVENOUS at 10:36

## 2024-03-20 ASSESSMENT — PAIN DESCRIPTION - LOCATION
LOCATION: KNEE

## 2024-03-20 ASSESSMENT — PAIN SCALES - WONG BAKER: WONGBAKER_NUMERICALRESPONSE: NO HURT

## 2024-03-20 ASSESSMENT — PAIN DESCRIPTION - DESCRIPTORS
DESCRIPTORS: ACHING;DULL;PRESSURE;SORE
DESCRIPTORS: ACHING;SORE
DESCRIPTORS: SPASM
DESCRIPTORS: SPASM
DESCRIPTORS: ACHING;SORE

## 2024-03-20 ASSESSMENT — PAIN DESCRIPTION - FREQUENCY
FREQUENCY: INTERMITTENT
FREQUENCY: INTERMITTENT

## 2024-03-20 ASSESSMENT — PAIN SCALES - GENERAL
PAINLEVEL_OUTOF10: 8
PAINLEVEL_OUTOF10: 4
PAINLEVEL_OUTOF10: 6
PAINLEVEL_OUTOF10: 5
PAINLEVEL_OUTOF10: 3
PAINLEVEL_OUTOF10: 5
PAINLEVEL_OUTOF10: 5
PAINLEVEL_OUTOF10: 2

## 2024-03-20 ASSESSMENT — PAIN DESCRIPTION - ONSET
ONSET: ON-GOING
ONSET: ON-GOING

## 2024-03-20 ASSESSMENT — PAIN - FUNCTIONAL ASSESSMENT
PAIN_FUNCTIONAL_ASSESSMENT: ACTIVITIES ARE NOT PREVENTED
PAIN_FUNCTIONAL_ASSESSMENT: PREVENTS OR INTERFERES SOME ACTIVE ACTIVITIES AND ADLS

## 2024-03-20 ASSESSMENT — PAIN DESCRIPTION - PAIN TYPE
TYPE: ACUTE PAIN
TYPE: ACUTE PAIN;SURGICAL PAIN

## 2024-03-20 ASSESSMENT — PAIN DESCRIPTION - ORIENTATION
ORIENTATION: LEFT

## 2024-03-20 ASSESSMENT — PAIN DESCRIPTION - DIRECTION: RADIATING_TOWARDS: L CALF

## 2024-03-20 NOTE — PROGRESS NOTES
[facility]  INPATIENT OCCUPATIONAL THERAPY  [facility]  EVALUATION    Time:    Time In: 1000  Time Out: 1130  Timed Code Treatment Minutes: 74 Minutes  Minutes: 90      Date: 3/20/2024  Patient Name: Kirstin Estrada,   Gender: female      MRN: [id number]  : 1945  (78 y.o.)  Referring Practitioner: Dr KATIE Crespo  Diagnosis: History of total knee arthroplasty  Additional Pertinent Hx: Kirstin Estrada is a 78 y.o. female with PMH significant for hypertension, hyperlipidemia, and hyponatremia who was admitted to [facility] on 3/11/2024.  Patient initially presented to [facility] ED on 3/11/2024 from [facility] for evaluation of new onset A-fib with RVR and hypotension during procedure.  Per report, patient underwent a left total knee replacement by Dr. Salazar at [facility] on 3/11/2024.  Patient reportedly tolerated the procedure well, however, after the patient procedure she was noted to have new onset of A-fib with RVR along with hypotension.  Patient was given beta-blocker and IV fluids and transferred to [facility] for further evaluation management.  On arrival to ED, patient's heart rate noted to be in the 130s.   On admission Ortho and cardiology were consulted.  Per Ortho, patient is WBAT to left lower extremity.   Cardiology evaluated patient.  Patient noted to have an elevated BNP which was thought to likely be related to A-fib.  Recommending a 30-day event monitor to determine burden and need for chronic OAC.     During acute care stay, patient was in and out of A-fib with RVR.  On multiple occasions, plan was to pursue cardioversion, however, patient will then return to normal sinus rhythm.  Patient was started on Eliquis 5 mg twice daily.  Additionally, beta-blocker was increased.  Patient was transitioned from IV amiodarone to amiodarone 200 mg daily.  Patient will need close outpatient follow-up with cardiology.  Pt with rapid response on evening of 3/19 and found to be  in Afib with RVR . Medications adjusted and patient approved to continue rehab.    Restrictions/Precautions:  Restrictions/Precautions: Weight Bearing, Fall Risk  Left Lower Extremity Weight Bearing: Weight Bearing As Tolerated  Position Activity Restriction  Other position/activity restrictions: Monitor HR    Subjective  Chart Reviewed: Yes, Orders, Progress Notes, History and Physical  Patient assessed for rehabilitation services?: Yes    Subjective: Dr. Crespo approved OT evaluation and treatment.  Pt supine in bed and agreeable to OT, stating she is feeling better now. Vitals assess prior to and during tx.     Pain: 1 /10: left knee at rest/  patient states pain increases to 10/10 in left knee with standing     Vitals: Blood Pressure: 100/51 while supine in bed.   Oxygen: 99 %  Heart Rate: 73 bpm while seated EOB    During standing ADLs:   Heartrate 90-94 bpm    Social/Functional History:  Lives With: Alone  Type of Home: House  Home Layout: One level, Work area in basement, Able to Live on Main level with bedroom/bathroom  Home Access: Stairs to enter without rails  Entrance Stairs - Number of Steps: 1  Home Equipment: Cane, Walker, standard, Reacher, Sock aid (borrowing from a friend)   Bathroom Shower/Tub: Walk-in shower (reports she needs to step up and down to access shower)  Bathroom Toilet: Standard  Bathroom Accessibility: Walker accessible    Receives Help From: Family  ADL Assistance: Independent  Homemaking Assistance: Independent  Ambulation Assistance: Independent  Transfer Assistance: Independent    Active : Yes  Occupation: Retired  Type of Occupation: RN at [employer]  Additional Comments: Pt fully I prior to admission; son is close by to assist and daughter lives near [place].    VISION: Corrected, cataract surgery in 2022.   Wears \"cheaters\" for reading     HEARING:  WNL    COGNITION: WFL, slight decreased recall with distractions.  13/15 on BIMs    RANGE OF MOTION:  Bilateral Upper

## 2024-03-20 NOTE — FLOWSHEET NOTE
0048  /64    0058  /60    0117 HR 97 BP 96/56    0130 HR 96 BP 89/47    0140 HR 94 BP 91/56    0205 HR 87 BP 95/50    0238 HR 85 BP 90/50      Monitored vitals at the bedside for two hours following the administration of oral cardizem.  Patient tolerated well.  Patient still in a fib but the rate is far better controlled.  Will continue Cardizem 90mg q 6 hours scheduled for rate control.    Reach out for any further needs.    Liliana Vaughn PA-C

## 2024-03-20 NOTE — PROGRESS NOTES
RECREATIONAL THERAPY  [facility]      Date:  3/20/2024            Patient Name: Kirstin Estrada           MRN: [id number]  Acct: [id number]          YOB: 1945 (78 y.o.)       Gender: female   Diagnosis: History of total knee arthroplasty  Physician: Referring Practitioner: Dr KATIE Crespo    REASON FOR MISSED TREATMENT:  Pt would like to get her hair washed and styled by our beautician tomorrow morning and so appreciative-attempted RT evaluation x2 today-will attempt again tomorrow if time allows     Elis Gonzalez, CTRS    3/20/2024

## 2024-03-20 NOTE — PROGRESS NOTES
0010-0030-Rapid Response requested per Dr Liriano d/t AFib RVR on 12 lead EKG(pt had been awoken from sleep with palpitations) . Pt A/Ox4, PWD, Bedside monitor does reveal AFib -170 consistently. BP stable, SPO2 WNL on RA. Hospitalist providers at bedside, updated with pts history. Chart being reviewed, awaiting orders.     0030-Pt will stay on IPR, and will try oral med. Will be on bedside RR monitor to monitor rate/rhythm and this RN or Liliana PA will monitor.     0035-Son updated per provider, speaks with pt as well. Questions answered.

## 2024-03-20 NOTE — PROGRESS NOTES
Comprehensive Nutrition Assessment    Type and Reason for Visit: Initial, Consult (ONS)    Nutrition Recommendations/Plan:   Continue diet as ordered.   Initiate Sajan BID and continue at discharge.   Continue MVI at discharge.      Malnutrition Assessment:  Malnutrition Status: No malnutrition  Context: Acute Illness       Findings of the 6 clinical characteristics of malnutrition:  Energy Intake:  No significant decrease in energy intake  Weight Loss:  No significant weight loss     Body Fat Loss:  No significant body fat loss     Muscle Mass Loss:  No significant muscle mass loss    Fluid Accumulation:  No significant fluid accumulation     Strength:  Not Performed    Nutrition Assessment:    Pt. nutritionally compromised AEB wounds.  At risk for further nutrition compromise r/t increased nutrient needs for wound healing, underlying medical condition (no PMHx on file).     Nutrition Related Findings:    Patient/ Family Comments: Per patient her appetite has been terrible, but she thinks it is because she is on new medications that are affecting her appetite. Per patient she usually eats 3 small meals and multiple snacks per day. She is willing to trial Sajan to promote wound healing.   Appetite: POOR  Edema: none,per flowsheet  Skin Integrity: Surgical incision per flowsheet- Surgical Incision (L knee)     GI Function: LBM 03/19/24 per flowsheet  - On bowel regimen: no  Labs:   Recent Labs     03/19/24  0836 03/20/24  0012   * 126*   K 4.2 3.8   CL 94* 91*   GLUCOSE 96 96   BUN 15 20   CREATININE 0.6 0.6   MG 2.1  --    PHOS 3.0  --      Medications: MVI, apixaban, metoprolol, amiodarone    Current Nutrition Intake & Therapies:    Recent PO intake: %  Recent Supplement Intake: None Ordered    - Current intake likely meets estimated needs.   ADULT DIET; Regular    Anthropometric Measures:  Height: 160 cm (5' 3\")  Ideal Body Weight (IBW): 115 lbs  Admission Body Gage: 64.8 kg (142 lb 13.7 oz)

## 2024-03-20 NOTE — SIGNIFICANT EVENT
Rapid called per Consulting Dr. Liriano request due to high pulse rate.  Woke up and stated her heart was pounding.  EKG done and is abnormal.  NP Viridiana,  Dr. Patel, Dr. Boston, Resource nurse Marianne and Supervisor Lina.  Pt assessed, charting reviewed, labs reviewed and is  being monitored,   IV started in left hand, Labs drawn and new orders obtained.   Skin w/d pink. Resp easy and unlabored.  Denies pain, nausea. A&OX4, answering appropriately.

## 2024-03-20 NOTE — PROGRESS NOTES
Follow up with Dr. Fabian in 6 months   Patient: Kirstin Estrada  Unit/Bed: 8K-15/015-A  YOB: 1945  MRN: [id number] Acct: [id number]   Admitting Diagnosis: History of total knee arthroplasty, left [Z96.652]  Admit Date:  3/19/2024  Hospital Day: 1    Assessment:     Principal Problem:    History of total knee arthroplasty, left  Resolved Problems:    * No resolved hospital problems. *      Plan:     I discussed the low sodium with the patient and Dr Crespo. We will wait and see what the result is for tomorrow and then decide on resuming the pre op salt tablets or nephrology referral.  Dr Crespo reaching out to cardiology for help for her recurrent episodes of afib.  I heard some irregular beats listening to her chest and a stat EKG was NSR.        Subjective:     Patient has no complaint of CP or SOB, no vomiting or diarrhea or excessive water intake..   Medication side effects: none    Scheduled Meds:   [Held by provider] dilTIAZem  90 mg Oral 4 times per day    multivitamin  1 tablet Oral Daily    sodium chloride flush  5-40 mL IntraVENous 2 times per day    apixaban  5 mg Oral BID    magnesium oxide  400 mg Oral Daily    metoprolol succinate  25 mg Oral Daily    amiodarone  200 mg Oral Daily     Continuous Infusions:   sodium chloride       PRN Meds:sodium chloride flush, sodium chloride, polyethylene glycol, potassium chloride **OR** potassium alternative oral replacement **OR** potassium chloride, magnesium sulfate, melatonin, traMADol **OR** traMADol, acetaminophen, ondansetron    Review of Systems  Pertinent items are noted in HPI.    Objective:     Patient Vitals for the past 8 hrs:   BP Temp Temp src Pulse Resp SpO2 Weight   03/20/24 1028 (!) 100/51 -- Temporal 59 16 98 % --   03/20/24 0738 (!) 101/54 97.9 °F (36.6 °C) Oral (!) 42 20 98 % --   03/20/24 0637 (!) 84/51 -- -- 87 -- 99 % --   03/20/24 0537 115/69 -- -- (!) 103 -- 98 % --   03/20/24 0532 -- -- -- -- -- -- 62.9 kg (138 lb 10.7 oz)     I/O last 3 completed shifts:  In:

## 2024-03-20 NOTE — PROGRESS NOTES
dysfunction, medication management, nutrition and hydration management, Ongoing assessment of safety, Pain management, Patient and family education, Prevention of secondary complications, Skin Integrity.     Required therapy: Physical Therapy, Occupational Therapy 3 hours per day, 5-6 days per week.  Recreational Therapy 1 hour per week.     Expected Discharge Destination: Home     Expected Post Discharge Treatments: Out Patient     Other information relevant to the care needs:   Lives With: Alone  Type of Home: House  Home Layout: One level  Home Access: Stairs to enter without rails  Entrance Stairs - Number of Steps: 1  Bathroom Shower/Tub: Walk-in shower (reports she has to step up and then down into shower)  Home Equipment: Cane, Walker, standard  Ambulation Assistance: Independent  Transfer Assistance: Independent  Active : Yes  Occupation: Retired  Type of Occupation: RN at [employer]  Additional Comments: Pt fully I prior to admission; son is close by to assist     Acute Inpatient Rehabilitation Disclosure Statement provided to patient.  Patient verbalized understanding.      Patient requires an intensive and coordinate interdisciplinary team approach to the delivery of rehabilitation care including PT/OT and 24 hour nursing care and physician supervision to safely return to home setting with family.        I have reviewed and concur with the findings and results of the pre-admission screening assessment completed by the Inpatient Rehabilitation Admissions Coordinator.         Electronically signed by Beth Crespo DO on 3/19/2024 at 9:35 AM                 Revision History    Date/Time User Provider Type Action   3/19/2024  9:35 AM Beth Crespo DO Physician Sign   3/19/2024  9:22 AM Richard Schafer RN Registered Nurse Sign    View Details Report

## 2024-03-20 NOTE — PROGRESS NOTES
Physical Medicine & Rehabilitation   Progress Note    Chief Complaint:  Debility 2/2 TKA and new onset a-fib       Subjective:  Patient seen independently this morning and then again on rounds with SINGH Pierce, MSW, following patient's inpatient Rehab Team Conference. Patient reports improved sleep overnight, however, ongoing fatigue. Also poor appetite since admission. She has some pain in the left calf which she states has been present since she was in acute care. No necessarily worsening.  We discussed doppler for DVT, however, gera was recently started on eliquis for A-fib and she would rather continue to monitor for now. No calf tenderness on exam. No reports of CP or SOB. +BM this morning. No afib episodes and EKG this am with NSR. Cardiology seeing today.       Rehabilitation: PT and OT updates reviewed during team rounds. See separate note.         Review of Systems:  CONSTITUTIONAL:  negative for  fevers and chills; positive for decreased appetite  EYES:  negative for  visual disturbance and irritation  HEENT:  negative for  hearing loss and voice change  RESPIRATORY:  negative for  dyspnea and wheezing  CARDIOVASCULAR:  negative for  chest pain, positive for intermittent A-fib  GASTROINTESTINAL:  negative for nausea, vomiting, and diarrhea  GENITOURINARY:  negative for frequency and dysuria  SKIN:  negative for rash  MUSCULOSKELETAL:  positive for  arthralgias, pain, joint swelling, decreased range of motion, and bone pain  NEUROLOGICAL:  positive for gait problems and weakness  BEHAVIOR/PSYCH:  negative  System review otherwise negative    Objective:  BP (!) 145/77   Pulse 88   Temp 97.9 °F (36.6 °C) (Oral)   Resp 16   Ht 1.6 m (5' 3\")   Wt 63.5 kg (139 lb 15.9 oz)   SpO2 97%   BMI 24.80 kg/m²   Patient is awake and alert, sitting up in bedside chair.  Orientation: oriented within conversation  Mood: within normal limits  Affect: calm  General appearance: Patient is well nourished,  well developed, well groomed and in no acute distress     Memory: Not formally assessed, however, patient is able to provide detailed history  Attention/Concentration: normal  Language:  normal     Cranial Nerves:  cranial nerves II-XII are grossly intact  ROM:  abnormal -left lower extremity  Motor Exam: Moving all extremities  Tone:  normal  Muscle bulk: within normal limits  Gait: Not assessed     Heart: Well-perfused extremities  Lungs: Breathing comfortably on room air without any increased work of breathing  Abdomen:  non-distended  Skin: warm and dry, no rash or erythema on exposed skin      Diagnostics:   Recent Results (from the past 24 hour(s))   EKG 12 Lead    Collection Time: 03/20/24 12:01 PM   Result Value Ref Range    Ventricular Rate 74 BPM    Atrial Rate 74 BPM    P-R Interval 142 ms    QRS Duration 74 ms    Q-T Interval 402 ms    QTc Calculation (Bazett) 446 ms    P Axis 64 degrees    R Axis 50 degrees    T Axis 43 degrees   EKG 12 Lead    Collection Time: 03/21/24  4:31 AM   Result Value Ref Range    Ventricular Rate 86 BPM    Atrial Rate 86 BPM    P-R Interval 174 ms    QRS Duration 74 ms    Q-T Interval 390 ms    QTc Calculation (Bazett) 466 ms    P Axis 54 degrees    R Axis 87 degrees    T Axis 68 degrees   Basic Metabolic Panel    Collection Time: 03/21/24  5:42 AM   Result Value Ref Range    Sodium 131 (L) 135 - 145 meq/L    Potassium 5.5 (H) 3.5 - 5.2 meq/L    Chloride 94 (L) 98 - 111 meq/L    CO2 24 23 - 33 meq/L    Glucose 91 70 - 108 mg/dL    BUN 14 7 - 22 mg/dL    Creatinine 0.6 0.4 - 1.2 mg/dL    Calcium 8.6 8.5 - 10.5 mg/dL   Anion Gap    Collection Time: 03/21/24  5:42 AM   Result Value Ref Range    Anion Gap 13.0 8.0 - 16.0 meq/L   Glomerular Filtration Rate, Estimated    Collection Time: 03/21/24  5:42 AM   Result Value Ref Range    Est, Glom Filt Rate >60 >60 ml/min/1.73m2       Impression:  Left TKA  New onset A-fib with RVR  Essential hypertension  Hyponatremia  Elevated liver

## 2024-03-20 NOTE — PLAN OF CARE
I reached out to Cardiology provider, Shahla Forbes, again this afternoon. She is recommending making metoprolol BID and increasing her amiodarone to 200 mg BID. Both Metoprolol and amiodarone to be given tonight. EKG in the morning. She will be able to see patient tomorrow. Orders placed per Cardiology recommendations. Nurse notified of new orders.      Electronically signed by Beth Crespo DO on 3/20/2024 at 5:59 PM

## 2024-03-20 NOTE — PLAN OF CARE
Problem: Discharge Planning  Goal: Discharge to home or other facility with appropriate resources  3/20/2024 1143 by Coty Iqbal LISW  Note:   [facility]  Physical Medicine Case Management Assessment    [x] Inpatient Rehabilitation Unit    Patient Name: Kirstin Estrada        MRN: [id number]    : 1945  (78 y.o.)  Gender: female   Date of Admission: 3/19/2024  2:41 PM    Family/Social/Home Environment:   Prior to admission, patient was living alone. Patient reported being independent at home. Patient was completing her ADLs, housekeeping, meal prep, errands, finances and driving. 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 Patient's supports include her children: Nura ([place]) and Yassine ([place]). They both work during the day, but Nura lives close by to help if needed. Patient's family physician is Evelio Hennessy MD. [facility]. Patient reports having a standard walker, cane and hip kit at home. Patient reports wanting to use [facility] at discharge. Patient is motivated to participate in therapy and return to her prior level of functioning.    Social/Functional History  Lives With: Alone  Type of Home: House  Home Layout: One level, Work area in basement, Able to Live on Main level with bedroom/bathroom  Home Access: Stairs to enter without rails  Entrance Stairs - Number of Steps: 1  Bathroom Shower/Tub: Walk-in shower (reports she needs to step up and down to access shower)  Bathroom Toilet: Standard  Bathroom Accessibility: Walker accessible  Home Equipment: Cane, Walker, standard, Reacher, Sock aid (borrowing from a friend)  Has the patient had two or more falls in the past year or any fall with injury in the past year?: No  Receives Help From: Family  ADL Assistance: Independent  Homemaking Assistance: Independent  Ambulation Assistance: Independent  Transfer Assistance: Independent  Active : Yes  Occupation: Retired  Type of Occupation: RN at

## 2024-03-20 NOTE — PLAN OF CARE
Goals?: Yes  Long term goal 6: Patient to demonstrated increased L knee flexion ROM to >/=100 degrees in order to assist with functional mobility.    Plan of Care: Patient to be seen by physical therapy services  (5x/wk 90 min)       Anticipated interventions may include therapeutic exercises, gait training, neuromuscular re-ed, transfer training, community reintegration, bed mobility, w/c mobility and training.      OCCUPATIONAL THERAPY:  Goals:             Short Term Goals  Time Frame for Short Term Goals: 1 week  Short Term Goal 1: Pt will complete LB dressing and footwear mgt using LHAE prn to improve indep with donning her shoes.  Short Term Goal 2: Pt will tolerate x8 min standing with 1-2 UE release at SBA to improve dynamic balance and activity toelrance requried fro sinkside I/ADLs.  Short Term Goal 3: Pt will complete shower transfers in simulated home set up with SBA to improve indep with accessing her bathroom at home.  Short Term Goal 4: Pt will retrieve x5 items from various surface heights at SBA to improve safety, balance, and toelrance required for IADLs and ADL prep/clean up.  Long Term Goals  Time Frame for Long Term Goals : 2 weeks from OT evaluation  Long Term Goal 1: Pt will compelte BADL routine with modified independence using AE prn to improve indep with self care at home alone.  Long Term Goal 2: Pt will complete light cooking tasks with modified independence with use of RW to improve indep preparing lunch for self at home.  Long Term Goal 3: Pt will use RW to complete light IADL tasks with mod I and no vcs for modifications and AE safety to improve indep at home alone.  Long Term Goal 4: Pt will complete BUE strengthening HEP while following handout with mod I to improve UB strength and endurance for home mgt tasks.    Plan of Care: Patient to be seen by occupational therapy services 5x/wk for 90 min     Anticipated interventions may include ADL and IADL retraining, strengthening, safety  education and training, patient/caregiver education and training, equipment evaluation/ training/procurement, neuromuscular reeducation, wheelchair mobility training.      SPEECH THERAPY:        No Speech Therapy Services required    CASE MANAGEMENT:  Goals:   Assist patient/family with discharge planning, patient/family counseling,  and coordination with insurance during the inpatient rehabilitation stay.         Other members of the multidisciplinary rehabilitation team that will be involved in the patient's plan of care include recreational therapy, dietary, respiratory therapy, and neuropsychology.    Medical issues being managed closely and that require 24 hour availability of a physician:  Bowel/Bladder function, Wound care, Pain management, Infection protection, DVT prophylaxis, Fall precautions, Fluid/Electrolyte balance, Nutritional status, Respiratory needs, and Anemia                                           Physician anticipated functional outcomes: Improved independence with functional measures   Estimated length of stay for this admission 2 weeks  Medical Prognosis: Good  Anticipated disposition: Home.      The potential to achieve the above medical and rehabilitative goals is very good.    This plan of care has been developed with the assistance and input of the multidisciplinary rehabilitation team.  The plan was reviewed with the patient.  The patient has had the opportunity to provide input to the therapy team.    I have reviewed this Individualized Plan of Care and agree with its contents.  Above documentation has been expanded, modified, adjusted to reflect the findings of my evaluations and goals for the patient.    Physician:  Beth Crespo DO    Electronically signed by Beth Crespo DO on 3/20/2024 at 5:14 PM

## 2024-03-20 NOTE — PROGRESS NOTES
[facility]  INPATIENT REHABILITATION  TEAM CONFERENCE NOTE    Conference Date: 3/21/2024  Admit Date:  3/19/2024  2:41 PM  Patient Name: Kirstin Estrada    MRN: [id number]    : 1945  (78 y.o.)  Rehabilitation Admitting Diagnosis:  History of total knee arthroplasty, left [Z96.652]  Referring Practitioner: Beth Crespo DO      CASE MANAGEMENT  Current issues/needs regarding patient and family discharge status: Prior to admission, patient was living alone. Patient reported being independent at home. Patient was completing her ADLs, housekeeping, meal prep, errands, finances and driving. 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 Patient's supports include her children: Nura ([place]) and Yassine ([place]). They both work during the day, but Nura lives close by to help if needed. Patient's family physician is Evelio Hennessy MD. Patient prefers [facility]. Patient reports having a standard walker, cane and hip kit at home. Patient reports wanting to use [facility] at discharge. Patient is motivated to participate in therapy and return to her prior level of functioning.     PHYSICAL THERAPY  Patient is a 78 y.o female who presents s/p L total knee arthroplasty on 3/11/24 by Dr. Siu with patient having complex hospitilization with A-fib and RVR. Patient demonstrates decreased strength in LLE and decreased L knee ROM resulting in increased difficulty with functional mobility. Patient demonstrates L knee flexion ROM to 64 degrees and L knee extension lacking 9 degrees from 0. Patient requires minimal assistance for bed mobility, CGA for sit<>stand transfers and CGA for ambulation with use of RW with decrased L TKE, decreased tolerance to weight shifting to the left and heavy reliance on BUE support on RW. Patient demonstrates decreased dynamic standing balance resulting in increased risk for falls.  Equipment Needed: Yes (Continue to assess pending progress (Patient has borrowed standard

## 2024-03-20 NOTE — PROGRESS NOTES
[facility]  INPATIENT PHYSICAL THERAPY  EVALUATION  [facility] - [id number]    Time In: 1143  Time Out: 1205  Timed Code Treatment Minutes: 8 Minutes  Minutes: 22          Date: 3/20/2024  Patient Name: Kirstin Estrada,  Gender:  female        MRN: [id number]  : 1945  (78 y.o.)      Referring Practitioner: Beth Crespo DO  Diagnosis: History of Total knee arthroplasty, left  Additional Pertinent Hx: Kirstin Estrada is a 78 y.o. female with PMH significant for hypertension, hyperlipidemia, and hyponatremia who was admitted to [facility] on 3/11/2024.  Patient initially presented to [facility] ED on 3/11/2024 from [facility] for evaluation of new onset A-fib with RVR and hypotension during procedure.  Per report, patient underwent a left total knee replacement by Dr. Salazar at [facility] on 3/11/2024.  Patient reportedly tolerated the procedure well, however, after the patient procedure she was noted to have new onset of A-fib with RVR along with hypotension.  Patient was given beta-blocker and IV fluids and transferred to [facility] for further evaluation management.  On arrival to ED, patient's heart rate noted to be in the 130s.   On admission Ortho and cardiology were consulted.  Per Ortho, patient is WBAT to left lower extremity.   Cardiology evaluated patient.  Patient noted to have an elevated BNP which was thought to likely be related to A-fib.  Recommending a 30-day event monitor to determine burden and need for chronic OAC.     During acute care stay, patient was in and out of A-fib with RVR.  On multiple occasions, plan was to pursue cardioversion, however, patient will then return to normal sinus rhythm.  Patient was started on Eliquis 5 mg twice daily.  Additionally, beta-blocker was increased.  Patient was transitioned from IV amiodarone to amiodarone 200 mg daily.  Patient will need close outpatient follow-up with cardiology.  Pt with rapid response on evening of  assess pending progress (Patient has borrowed standard walker will likely need RW))    Plan:  Current Treatment Recommendations: Strengthening, Home exercise program, Functional mobility training, Stair training, Gait training, Therapeutic activities, Safety education & training, Transfer training, Patient/Caregiver education & training, Equipment evaluation, education, & procurement, Balance training, Endurance training, Neuromuscular re-education  General Plan:  (5x/wk 90 min)    Goals:  Patient Goals : None stated  Short Term Goals  Time Frame for Short Term Goals: 1 week  Short Term Goal 1: Patient to perform supine<>sit with SBA in order to assist with getting into and out of bed.  Short Term Goal 2: Patient to perform sit<>stand transfers with use of RW and Supervision in order to assist with safety with transfers in home.  Short Term Goal 3: Patient to ambulate >/=30 feet with use of RW and SBA in order to assist with home mobility.  Short Term Goal 4: Patient to ascend/descend 1 steps with B handrails and CGA in order to assist with home entry.  Short Term Goal 5: Patient to demonstrated increased L knee flexion ROM to >/=90 degrees in order to assist with increased ease with transfers and mobility.  Long Term Goals  Time Frame for Long Term Goals : 2 weeks from IPR evaluation  Long Term Goal 1: Patient to perform supine<>sit with Mod I in order to assist with getting into and out of bed.  Long Term Goal 2: Patient to perform sit<>stand transfers with use of RW and Mod I in order to assist with safety with transfers in home.  Long Term Goal 3: Patient to ambulate >/=100 feet with use of RW and Mod I in order to assist with home and community mobility.  Long Term Goal 4: Patient to perform car transfer with Supervision in order to assist with getting to and from appointments.  Long Term Goal 5: Patient to score >/=19/28 on the Tinetti in order to reduce risk for falls.  Additional Goals?: Yes  Long term goal

## 2024-03-20 NOTE — PROGRESS NOTES
flexion to 70 degrees. Patient instructed in stretching in order to assist with ROM and ease with functional mobility.     Functional Outcome Measures:   Completed.    Tinetti Balance    Sitting Balance: Steady, safe  Arises: Able, uses arms to help  Attempts to Arise: Able to arise, one attempt  Immediate Standing Balance (First 5 Seconds): Steady but uses walker or other support  Standing Balance: Steady but wide stance, uses cane or other support  Nudged: Staggers, grabs, catches self  Eyes Closed: Unsteady  Turned 360 Degrees: Steadiness: Unsteady (grabs, staggers)  Turned 360 Degrees: Continuity of Steps: Discontinuous steps  Sitting Down: Uses arms or not a smooth motion  Balance Score: 8/16 Initiation of Gait: No hesitancy  Step Height: R Swing Foot: Right foot complete clears floor  Step Length: R Swing Foot: Does not pass left stance foot with step  Step Height: L Swing Foot: Left foot does not clear floor completely with step  Step Length: L Swing Foot: Does not pass right stance foot with step  Step Symmetry: Right and left step length not equal (estimate)  Step Continuity: Steps appear continuous  Path: Mild/moderate deviation or uses walking aid  Trunk: Marked sway or uses walking aid  Walking Time: Heels apart  Gait Score: 4/12     Current Score: 12 / 28 (Date: 3/20/2024)    Interpretation of Score: Tinetti is  into a gait score and balance score. The higher the patient's score, the more independent/lower fall risk. A total score of 24 or more indicates low fall risk, 19-23 is moderate fall risk, and 18 or less is indicative of high fall risk.   Modified Hubbell Scale:  Not Applicable     ASSESSMENT:  Assessment: Patient progressing toward established goals.  Activity Tolerance:  Patient tolerance of  treatment: fair.   Limited due to L knee pain  Equipment Recommendations:Equipment Needed: Yes (Continue to assess pending progress (Patient has borrowed standard walker will likely need  RW))  Discharge Recommendations: Continue to assess pending progress, Therapy recommended at discharge     PLAN: Current Treatment Recommendations: Strengthening, Home exercise program, Functional mobility training, Stair training, Gait training, Therapeutic activities, Safety education & training, Transfer training, Patient/Caregiver education & training, Equipment evaluation, education, & procurement, Balance training, Endurance training, Neuromuscular re-education  General Plan:  (5x/wk 90 min)    Patient Education  Patient Education: Functional Mobility, Verbal Exercise Instruction, Bed Mobility, Transfers, Gait,  - Patient Verbalized Understanding, - Patient Requires Continued Education    Goals:  Patient Goals : None stated  Short Term Goals  Time Frame for Short Term Goals: 1 week  Short Term Goal 1: Patient to perform supine<>sit with SBA in order to assist with getting into and out of bed.  Short Term Goal 2: Patient to perform sit<>stand transfers with use of RW and Supervision in order to assist with safety with transfers in home.  Short Term Goal 3: Patient to ambulate >/=30 feet with use of RW and SBA in order to assist with home mobility.  Short Term Goal 4: Patient to ascend/descend 1 steps with B handrails and CGA in order to assist with home entry.  Short Term Goal 5: Patient to demonstrated increased L knee flexion ROM to >/=90 degrees in order to assist with increased ease with transfers and mobility.  Long Term Goals  Time Frame for Long Term Goals : 2 weeks from IPR evaluation  Long Term Goal 1: Patient to perform supine<>sit with Mod I in order to assist with getting into and out of bed.  Long Term Goal 2: Patient to perform sit<>stand transfers with use of RW and Mod I in order to assist with safety with transfers in home.  Long Term Goal 3: Patient to ambulate >/=100 feet with use of RW and Mod I in order to assist with home and community mobility.  Long Term Goal 4: Patient to perform car

## 2024-03-20 NOTE — PROGRESS NOTES
[facility]  Individualized Disclosure Statement      Patient: Kirstin Estrada      Scope of Service  [facility] provides 24 hour individualized service to patients with functional limitations due to, but not limited to: stroke, brain injury, spinal cord injury, major multiple trauma, fractures, amputation, and neurological disorders. The Rehabilitation Center provides rehabilitative nursing and medical services as well as physical, occupational, speech, and recreation therapies.  [facility]] is fully accredited by the Commission on Accreditation of Rehabilitation Facilities (CARF) as a comprehensive provider of rehabilitation services.  Patients admitted to the [facility] receive a minimum of three hours of therapy per day, at least five days per week, with a revised therapy schedule on weekends and holidays.  Physical therapy, occupational therapy, and speech therapy are provided seven days per week including holidays.  Other therapeutic services are available on weekends and evenings as needed or scheduled.  Intensity of Treatment  Your treatment program will consist of Nursing Care and:  1.5 hours of Physical Therapy, per day  1.5 hours of Occupational Therapy, per day   30-60 minutes of Speech Therapy, per day  1 hour of Recreational Therapy, per week  Depending on your needs, the exact time spent with each of the above disciplines may fluctuate, however you will receive at least 3 hours of therapy at least 5 days per week.    [facility] maintains contracts with most insurance plans.  Depending on the type of coverage, the insurance may impose limits on the coverage for rehabilitation care.  Coverage is based on the premise that you are able to fully participate in the rehabilitation program and show continued progress. Please verify your own insurance information. A

## 2024-03-20 NOTE — PLAN OF CARE
Problem: Discharge Planning  Goal: Discharge to home or other facility with appropriate resources  Outcome: Progressing  Flowsheets (Taken 3/19/2024 2008)  Discharge to home or other facility with appropriate resources: Identify barriers to discharge with patient and caregiver     Problem: Safety - Adult  Goal: Free from fall injury  Outcome: Progressing     Problem: ABCDS Injury Assessment  Goal: Absence of physical injury  3/20/2024 0208 by Heike Smallwood RN  Outcome: Progressing     Problem: Pain  Goal: Verbalizes/displays adequate comfort level or baseline comfort level  3/20/2024 0208 by Heike Smallwood RN  Outcome: Progressing     Problem: Skin/Tissue Integrity  Goal: Absence of new skin breakdown  Description: 1.  Monitor for areas of redness and/or skin breakdown  2.  Assess vascular access sites hourly  3.  Every 4-6 hours minimum:  Change oxygen saturation probe site  4.  Every 4-6 hours:  If on nasal continuous positive airway pressure, respiratory therapy assess nares and determine need for appliance change or resting period.  Outcome: Progressing     Problem: Cardiovascular - Adult  Goal: Maintains optimal cardiac output and hemodynamic stability  Outcome: Progressing  Flowsheets (Taken 3/20/2024 0208)  Maintains optimal cardiac output and hemodynamic stability: Monitor blood pressure and heart rate  Note: Afib rvr is being closely monitored and medicated with Cardizem this shift     Problem: Musculoskeletal - Adult  Goal: Return mobility to safest level of function  Outcome: Progressing     Problem: Infection - Adult  Goal: Absence of infection at discharge  Outcome: Progressing     Problem: Infection - Adult  Goal: Absence of infection during hospitalization  Outcome: Progressing     Problem: Infection - Adult  Goal: Absence of fever/infection during anticipated neutropenic period  Outcome: Progressing     Problem: Metabolic/Fluid and Electrolytes - Adult  Goal: Electrolytes maintained within

## 2024-03-20 NOTE — CONSULTS
Department of Family Practice  Consult Note        Reason for Consult:  Medical management while on the Inpatient Rehab unit.    Requesting Physician:  Dr Crespo    CHIEF COMPLAINT:   The need to continue the intensive time with therapies following the acute hospital stay.    History Obtained From:  patient, EMR    HISTORY OF PRESENT ILLNESS:              The patient is a 78 y.o. female with significant past medical history of No past medical history on file.   who presents with having a left total knee replacement done at [facility]. In recovery she was found to have afib with RVR. She was sent to [facility] where she was seen by cardiology. She would seen to go into and out of a fib and presently has a heart monitor on. Now that she is more medically stable, she has come to the Inpatient Rehab Unit to continue the time with therapies prior to a discharge disposition being made.      Past Medical History:    No past medical history on file.  Past Surgical History:        Procedure Laterality Date    HYSTERECTOMY (CERVIX STATUS UNKNOWN)  1995    OVARY REMOVAL  1995     Current Medications:   Current Facility-Administered Medications: [START ON 3/20/2024] multivitamin 1 tablet, 1 tablet, Oral, Daily  sodium chloride flush 0.9 % injection 5-40 mL, 5-40 mL, IntraVENous, 2 times per day  sodium chloride flush 0.9 % injection 5-40 mL, 5-40 mL, IntraVENous, PRN  0.9 % sodium chloride infusion, , IntraVENous, PRN  polyethylene glycol (GLYCOLAX) packet 17 g, 17 g, Oral, Daily PRN  potassium chloride (KLOR-CON M) extended release tablet 40 mEq, 40 mEq, Oral, PRN **OR** potassium bicarb-citric acid (EFFER-K) effervescent tablet 40 mEq, 40 mEq, Oral, PRN **OR** potassium chloride 10 mEq/100 mL IVPB (Peripheral Line), 10 mEq, IntraVENous, PRN  magnesium sulfate 2000 mg in 50 mL IVPB premix, 2,000 mg, IntraVENous, PRN  apixaban (ELIQUIS) tablet 5 mg, 5 mg, Oral, BID  [START ON 3/20/2024] magnesium oxide (MAG-OX) tablet 400 mg, 400

## 2024-03-20 NOTE — SIGNIFICANT EVENT
Rapid response called overhead. Provider arrived to patient room. EKG completed and shows Afib with RVR . Known history of Afib during inpatient hospitalization. Patient reports feeling heart palpitations but otherwise asymptomatic. BMP and Mg ordered.     Plan:  Cardizem 90mg PO once  Monitor on bedside tele  If no conversion, transfer to inpatient unit    Patients son Nura notified of patient status and plan of care. Verbalized understanding.     Electronically signed by LETICIA Cagle CNP on 3/20/2024 at 12:42 AM

## 2024-03-21 ENCOUNTER — TELEPHONE (OUTPATIENT)
Dept: CARDIOLOGY CLINIC | Age: 79
End: 2024-03-21

## 2024-03-21 LAB
ANION GAP SERPL CALC-SCNC: 13 MEQ/L (ref 8–16)
BUN SERPL-MCNC: 14 MG/DL (ref 7–22)
CALCIUM SERPL-MCNC: 8.6 MG/DL (ref 8.5–10.5)
CHLORIDE SERPL-SCNC: 94 MEQ/L (ref 98–111)
CO2 SERPL-SCNC: 24 MEQ/L (ref 23–33)
CREAT SERPL-MCNC: 0.6 MG/DL (ref 0.4–1.2)
GFR SERPL CREATININE-BSD FRML MDRD: > 60 ML/MIN/1.73M2
GLUCOSE SERPL-MCNC: 91 MG/DL (ref 70–108)
POTASSIUM SERPL-SCNC: 5.5 MEQ/L (ref 3.5–5.2)
SODIUM SERPL-SCNC: 131 MEQ/L (ref 135–145)

## 2024-03-21 PROCEDURE — 80048 BASIC METABOLIC PNL TOTAL CA: CPT

## 2024-03-21 PROCEDURE — 99232 SBSQ HOSP IP/OBS MODERATE 35: CPT | Performed by: STUDENT IN AN ORGANIZED HEALTH CARE EDUCATION/TRAINING PROGRAM

## 2024-03-21 PROCEDURE — 97116 GAIT TRAINING THERAPY: CPT

## 2024-03-21 PROCEDURE — 97535 SELF CARE MNGMENT TRAINING: CPT

## 2024-03-21 PROCEDURE — 97530 THERAPEUTIC ACTIVITIES: CPT

## 2024-03-21 PROCEDURE — 6370000000 HC RX 637 (ALT 250 FOR IP): Performed by: STUDENT IN AN ORGANIZED HEALTH CARE EDUCATION/TRAINING PROGRAM

## 2024-03-21 PROCEDURE — 97110 THERAPEUTIC EXERCISES: CPT

## 2024-03-21 PROCEDURE — 93005 ELECTROCARDIOGRAM TRACING: CPT | Performed by: STUDENT IN AN ORGANIZED HEALTH CARE EDUCATION/TRAINING PROGRAM

## 2024-03-21 PROCEDURE — 99232 SBSQ HOSP IP/OBS MODERATE 35: CPT | Performed by: REGISTERED NURSE

## 2024-03-21 PROCEDURE — 1180000000 HC REHAB R&B

## 2024-03-21 PROCEDURE — 6370000000 HC RX 637 (ALT 250 FOR IP): Performed by: REGISTERED NURSE

## 2024-03-21 PROCEDURE — 2580000003 HC RX 258: Performed by: STUDENT IN AN ORGANIZED HEALTH CARE EDUCATION/TRAINING PROGRAM

## 2024-03-21 PROCEDURE — 36415 COLL VENOUS BLD VENIPUNCTURE: CPT

## 2024-03-21 PROCEDURE — 93010 ELECTROCARDIOGRAM REPORT: CPT | Performed by: INTERNAL MEDICINE

## 2024-03-21 RX ORDER — DOCUSATE SODIUM 100 MG/1
100 CAPSULE, LIQUID FILLED ORAL 2 TIMES DAILY
Status: DISCONTINUED | OUTPATIENT
Start: 2024-03-21 | End: 2024-03-30 | Stop reason: HOSPADM

## 2024-03-21 RX ORDER — AMIODARONE HYDROCHLORIDE 200 MG/1
200 TABLET ORAL DAILY
Status: DISCONTINUED | OUTPATIENT
Start: 2024-03-27 | End: 2024-03-30 | Stop reason: HOSPADM

## 2024-03-21 RX ADMIN — METOPROLOL TARTRATE 25 MG: 25 TABLET, FILM COATED ORAL at 21:55

## 2024-03-21 RX ADMIN — Medication 400 MG: at 08:30

## 2024-03-21 RX ADMIN — APIXABAN 5 MG: 5 TABLET, FILM COATED ORAL at 08:30

## 2024-03-21 RX ADMIN — APIXABAN 5 MG: 5 TABLET, FILM COATED ORAL at 21:56

## 2024-03-21 RX ADMIN — Medication 1 TABLET: at 08:29

## 2024-03-21 RX ADMIN — METOPROLOL TARTRATE 25 MG: 25 TABLET, FILM COATED ORAL at 08:29

## 2024-03-21 RX ADMIN — TRAMADOL HYDROCHLORIDE 100 MG: 50 TABLET ORAL at 21:56

## 2024-03-21 RX ADMIN — AMIODARONE HYDROCHLORIDE 200 MG: 200 TABLET ORAL at 08:30

## 2024-03-21 RX ADMIN — TRAMADOL HYDROCHLORIDE 100 MG: 50 TABLET ORAL at 15:07

## 2024-03-21 RX ADMIN — SODIUM CHLORIDE, PRESERVATIVE FREE 10 ML: 5 INJECTION INTRAVENOUS at 08:35

## 2024-03-21 RX ADMIN — TRAMADOL HYDROCHLORIDE 50 MG: 50 TABLET ORAL at 08:30

## 2024-03-21 RX ADMIN — POLYETHYLENE GLYCOL 3350 17 G: 17 POWDER, FOR SOLUTION ORAL at 08:29

## 2024-03-21 RX ADMIN — AMIODARONE HYDROCHLORIDE 200 MG: 200 TABLET ORAL at 21:55

## 2024-03-21 ASSESSMENT — PAIN - FUNCTIONAL ASSESSMENT
PAIN_FUNCTIONAL_ASSESSMENT: ACTIVITIES ARE NOT PREVENTED

## 2024-03-21 ASSESSMENT — PAIN DESCRIPTION - PAIN TYPE: TYPE: ACUTE PAIN

## 2024-03-21 ASSESSMENT — PAIN DESCRIPTION - DESCRIPTORS
DESCRIPTORS: ACHING;SORE;TENDER
DESCRIPTORS: ACHING;DISCOMFORT
DESCRIPTORS: ACHING

## 2024-03-21 ASSESSMENT — PAIN DESCRIPTION - ORIENTATION
ORIENTATION: LEFT

## 2024-03-21 ASSESSMENT — PAIN DESCRIPTION - LOCATION
LOCATION: KNEE
LOCATION: LEG;HIP
LOCATION: KNEE

## 2024-03-21 ASSESSMENT — PAIN DESCRIPTION - ONSET: ONSET: ON-GOING

## 2024-03-21 ASSESSMENT — PAIN SCALES - GENERAL
PAINLEVEL_OUTOF10: 4
PAINLEVEL_OUTOF10: 8
PAINLEVEL_OUTOF10: 8
PAINLEVEL_OUTOF10: 2
PAINLEVEL_OUTOF10: 10

## 2024-03-21 ASSESSMENT — PAIN DESCRIPTION - DIRECTION: RADIATING_TOWARDS: L CALF

## 2024-03-21 ASSESSMENT — PAIN DESCRIPTION - FREQUENCY: FREQUENCY: INTERMITTENT

## 2024-03-21 NOTE — PLAN OF CARE
Problem: Discharge Planning  Goal: Discharge to home or other facility with appropriate resources  3/20/2024 2236 by Ashish Ni RN  Outcome: Progressing  Flowsheets (Taken 3/20/2024 1933)  Discharge to home or other facility with appropriate resources: Identify barriers to discharge with patient and caregiver  3/20/2024 1143 by Coty Iqbal LISW  Flowsheets (Taken 3/20/2024 0850 by Rgeina Cadena LPN)  Discharge to home or other facility with appropriate resources:   Identify barriers to discharge with patient and caregiver   Identify discharge learning needs (meds, wound care, etc)   Refer to discharge planning if patient needs post-hospital services based on physician order or complex needs related to functional status, cognitive ability or social support system  Note:   [facility]  Physical Medicine Case Management Assessment    [x] Inpatient Rehabilitation Unit    Patient Name: Kirstin Estrada        MRN: [id number]    : 1945  (78 y.o.)  Gender: female   Date of Admission: 3/19/2024  2:41 PM    Family/Social/Home Environment:   Prior to admission, patient was living alone. Patient reported being independent at home. Patient was completing her ADLs, housekeeping, meal prep, errands, finances and driving. 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 Patient's supports include her children: Nura ([place]) and Yassine ([place]). They both work during the day, but Nura lives close by to help if needed. Patient's family physician is Evelio Hennessy MD. Patient prefers [facility]. Patient reports having a standard walker, cane and hip kit at home. Patient reports wanting to use [facility] at discharge. Patient is motivated to participate in therapy and return to her prior level of functioning.    Social/Functional History  Lives With: Alone  Type of Home: House  Home Layout: One level, Work area in basement, Able to Live on Main level with bedroom/bathroom  Home Access: Stairs to  by Regina Cadena LPN  Maintains optimal cardiac output and hemodynamic stability:   Monitor blood pressure and heart rate   Assess for signs of decreased cardiac output     Problem: Musculoskeletal - Adult  Goal: Return mobility to safest level of function  Outcome: Progressing  Flowsheets (Taken 3/20/2024 1933)  Return Mobility to Safest Level of Function: Assess patient stability and activity tolerance for standing, transferring and ambulating with or without assistive devices     Problem: Infection - Adult  Goal: Absence of infection during hospitalization  Outcome: Progressing  Flowsheets (Taken 3/20/2024 1933)  Absence of infection during hospitalization:   Assess and monitor for signs and symptoms of infection   Monitor lab/diagnostic results     Problem: Metabolic/Fluid and Electrolytes - Adult  Goal: Electrolytes maintained within normal limits  Outcome: Progressing  Flowsheets  Taken 3/20/2024 1933 by Ashish Ni RN  Electrolytes maintained within normal limits:   Monitor labs and assess patient for signs and symptoms of electrolyte imbalances   Administer electrolyte replacement as ordered  Taken 3/20/2024 0850 by Regina Cadena LPN  Electrolytes maintained within normal limits:   Monitor labs and assess patient for signs and symptoms of electrolyte imbalances   Administer electrolyte replacement as ordered

## 2024-03-21 NOTE — TELEPHONE ENCOUNTER
Ref for Afib  Patient still currently admitted  Event put on?  Will continue to follow  and schedule accordingly      Plan:  Amiodarone load 200mg BID for 1 week; followed by 200mg daily thereafter- orders placed (did receive IV amio load during inpatient stay on 3/18)  Continue MELISSA/Eliquis  Has 30-d Preventice monitor in place   EP referral as outpatient to discuss ablation   Follow up with Dr. Romero in 4 weeks  Follow up with EP after event monitor (5-6 weeks)  Will follow PRN- please call if any recurrences of AF noted.            Electronically signed by LETICIA Gomez CNP on 3/21/2024 at 11:13 AM

## 2024-03-21 NOTE — PROGRESS NOTES
[facility]  INPATIENT PHYSICAL THERAPY  DAILY NOTE  [facility] - 8K-15/015-A    Time In: 1400  Time Out: 1500  Timed Code Treatment Minutes: 60 Minutes  Minutes: 60          Date: 3/21/2024  Patient Name: Kirstin Estrada,  Gender:  female        MRN: [id number]  : 1945  (78 y.o.)     Referring Practitioner: Beth Crespo DO  Diagnosis: History of Total knee arthroplasty, left  Additional Pertinent Hx: Kirstin Estrada is a 78 y.o. female with PMH significant for hypertension, hyperlipidemia, and hyponatremia who was admitted to [facility] on 3/11/2024.  Patient initially presented to [facility] ED on 3/11/2024 from [facility] for evaluation of new onset A-fib with RVR and hypotension during procedure.  Per report, patient underwent a left total knee replacement by Dr. Salazar at [facility] on 3/11/2024.  Patient reportedly tolerated the procedure well, however, after the patient procedure she was noted to have new onset of A-fib with RVR along with hypotension.  Patient was given beta-blocker and IV fluids and transferred to [facility] for further evaluation management.  On arrival to ED, patient's heart rate noted to be in the 130s.   On admission Ortho and cardiology were consulted.  Per Ortho, patient is WBAT to left lower extremity.   Cardiology evaluated patient.  Patient noted to have an elevated BNP which was thought to likely be related to A-fib.  Recommending a 30-day event monitor to determine burden and need for chronic OAC.     During acute care stay, patient was in and out of A-fib with RVR.  On multiple occasions, plan was to pursue cardioversion, however, patient will then return to normal sinus rhythm.  Patient was started on Eliquis 5 mg twice daily.  Additionally, beta-blocker was increased.  Patient was transitioned from IV amiodarone to amiodarone 200 mg daily.  Patient will need close outpatient follow-up with cardiology.  Pt with rapid response on evening of

## 2024-03-21 NOTE — PROGRESS NOTES
[facility]  INPATIENT PHYSICAL THERAPY  [facility] - 8K-15/015-A  Daily Note    Time In: 1210  Time Out: 1240  Timed Code Treatment Minutes: 30 Minutes  Minutes: 30          Date: 3/21/2024  Patient Name: Kirstin Estrada,  Gender:  female        MRN: [id number]  : 1945  (78 y.o.)     Referring Practitioner: Beth Crespo DO  Diagnosis: History of Total knee arthroplasty, left  Additional Pertinent Hx: Kirstin Estrada is a 78 y.o. female with PMH significant for hypertension, hyperlipidemia, and hyponatremia who was admitted to [facility] on 3/11/2024.  Patient initially presented to [facility] ED on 3/11/2024 from [facility] for evaluation of new onset A-fib with RVR and hypotension during procedure.  Per report, patient underwent a left total knee replacement by Dr. Salazar at [facility] on 3/11/2024.  Patient reportedly tolerated the procedure well, however, after the patient procedure she was noted to have new onset of A-fib with RVR along with hypotension.  Patient was given beta-blocker and IV fluids and transferred to [facility] for further evaluation management.  On arrival to ED, patient's heart rate noted to be in the 130s.   On admission Ortho and cardiology were consulted.  Per Ortho, patient is WBAT to left lower extremity.   Cardiology evaluated patient.  Patient noted to have an elevated BNP which was thought to likely be related to A-fib.  Recommending a 30-day event monitor to determine burden and need for chronic OAC.     During acute care stay, patient was in and out of A-fib with RVR.  On multiple occasions, plan was to pursue cardioversion, however, patient will then return to normal sinus rhythm.  Patient was started on Eliquis 5 mg twice daily.  Additionally, beta-blocker was increased.  Patient was transitioned from IV amiodarone to amiodarone 200 mg daily.  Patient will need close outpatient follow-up with cardiology.  Pt with rapid response on evening of

## 2024-03-21 NOTE — PROGRESS NOTES
[facility]  Recreational Therapy  Inpatient Rehabilitation Evaluation        Time Spent with Patient: 30 minutes    Date:  3/21/2024       Patient Name: Kirstin Estrada      MRN: [id number]       YOB: 1945 (78 y.o.)       Gender: female  Diagnosis: History of total knee arthroplasty  Referring Practitioner: Dr KATIE Crespo    RESTRICTIONS/PRECAUTIONS:  Restrictions/Precautions: Weight Bearing, Fall Risk     Hearing: Within functional limits    PAIN: 2    SUBJECTIVE:  pt lives alone-she has a son and a daughter close by and 6 grandchildren     VISION:  Within Normal Limit    HEARING: Within Normal Limit    LEISURE INTERESTS:   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, talked about her family all while getting her hair washed and styled from our beautician this am-appreciative     BARRIERS TO LEISURE INTERESTS:    Decreased endurance and Pain           Patient Education  New Education Provided: Importance of Leisure, RT Plan of Care    Plan:  Continue to follow patient through this admission  Include patient in appropriate groups  See patient individually    Electronically signed by: Elis Gonzalez CTRS  Date: 3/21/2024

## 2024-03-21 NOTE — PROGRESS NOTES
oz)   SpO2 97%   BMI 24.80 kg/m²   Patient is awake and alert, sitting up in bedside chair.  Daughter at the bedside  Orientation: oriented within conversation  Mood: within normal limits  Affect: calm  General appearance: Patient is well nourished, well developed, well groomed and in no acute distress     Memory: Not formally assessed, however, patient is able to provide detailed history  Attention/Concentration: normal  Language:  normal     Cranial Nerves:  cranial nerves II-XII are grossly intact  ROM:  abnormal -left lower extremity  Motor Exam: Moving all extremities  Tone:  normal  Muscle bulk: within normal limits  No calf tenderness  Gait: Not assessed     Heart: Well-perfused extremities, RRR no M/R/G noted  Lungs: Breathing comfortably on room air without any increased work of breathing, CTAB  Abdomen:  non-distended  Skin: warm and dry, no rash or erythema on exposed skin  Edema left lower extremity      Diagnostics:   Recent Results (from the past 24 hour(s))   EKG 12 Lead    Collection Time: 03/21/24  4:31 AM   Result Value Ref Range    Ventricular Rate 86 BPM    Atrial Rate 86 BPM    P-R Interval 174 ms    QRS Duration 74 ms    Q-T Interval 390 ms    QTc Calculation (Bazett) 466 ms    P Axis 54 degrees    R Axis 87 degrees    T Axis 68 degrees   Basic Metabolic Panel    Collection Time: 03/21/24  5:42 AM   Result Value Ref Range    Sodium 131 (L) 135 - 145 meq/L    Potassium 5.5 (H) 3.5 - 5.2 meq/L    Chloride 94 (L) 98 - 111 meq/L    CO2 24 23 - 33 meq/L    Glucose 91 70 - 108 mg/dL    BUN 14 7 - 22 mg/dL    Creatinine 0.6 0.4 - 1.2 mg/dL    Calcium 8.6 8.5 - 10.5 mg/dL   Anion Gap    Collection Time: 03/21/24  5:42 AM   Result Value Ref Range    Anion Gap 13.0 8.0 - 16.0 meq/L   Glomerular Filtration Rate, Estimated    Collection Time: 03/21/24  5:42 AM   Result Value Ref Range    Est, Glom Filt Rate >60 >60 ml/min/1.73m2       Impression:  Left TKA  New onset A-fib with RVR  Essential

## 2024-03-21 NOTE — PROGRESS NOTES
Patient: Kirstin Estrada  Unit/Bed: 8K-15/015-A  YOB: 1945  MRN: [id number] Acct: [id number]   Admitting Diagnosis: History of total knee arthroplasty, left [Z96.652]  Admit Date:  3/19/2024  Hospital Day: 2    This patient was seen earlier in the day.    Assessment:     Principal Problem:    History of total knee arthroplasty, left  Resolved Problems:    * No resolved hospital problems. *      Plan:     I spoke to Dr Crespo about the cardiology referral.        Subjective:     Patient has no complaint of CP or SOB..   Medication side effects: none    Scheduled Meds:   [START ON 3/27/2024] amiodarone  200 mg Oral Daily    docusate sodium  100 mg Oral BID    amiodarone  200 mg Oral BID    metoprolol tartrate  25 mg Oral BID    multivitamin  1 tablet Oral Daily    sodium chloride flush  5-40 mL IntraVENous 2 times per day    apixaban  5 mg Oral BID    magnesium oxide  400 mg Oral Daily     Continuous Infusions:   sodium chloride       PRN Meds:sodium chloride flush, sodium chloride, polyethylene glycol, potassium chloride **OR** potassium alternative oral replacement **OR** potassium chloride, magnesium sulfate, melatonin, traMADol **OR** traMADol, acetaminophen, ondansetron    Review of Systems  Pertinent items are noted in HPI.    Objective:     Patient Vitals for the past 8 hrs:   Resp   03/21/24 1507 18     I/O last 3 completed shifts:  In: 1385 [P.O.:1360; I.V.:25]  Out: -   I/O this shift:  In: 875 [P.O.:875]  Out: -     BP (!) 145/77   Pulse 88   Temp 97.9 °F (36.6 °C) (Oral)   Resp 18   Ht 1.6 m (5' 3\")   Wt 63.5 kg (139 lb 15.9 oz)   SpO2 97%   BMI 24.80 kg/m²     General appearance: alert, appears stated age, and cooperative  Head: Normocephalic, without obvious abnormality, atraumatic  Back: symmetric, no curvature. ROM normal. No CVA tenderness.  Lungs: clear to auscultation bilaterally  Heart: regular rate and rhythm, S1, S2 normal, no murmur, click, rub or gallop  Abdomen: soft,  non-tender; bowel sounds normal; no masses,  no organomegaly  Extremities: extremities normal, atraumatic, no cyanosis or edema  Skin: Skin color, texture, turgor normal. No rashes or lesions  Neurologic: weak    Electronically signed by Al Liriano MD on 3/21/2024 at 6:18 PM

## 2024-03-21 NOTE — PROGRESS NOTES
Pt was coping well and was anointed previously. She was blessed.    03/21/24 1518   Encounter Summary   Service Provided For: Patient   Referral/Consult From: [facility]   Support System Children   Last Encounter  03/21/24  (Anointed)   Complexity of Encounter Low   Begin Time 1455   End Time  1502   Total Time Calculated 7 min   Spiritual/Emotional needs   Type Spiritual Support   Rituals, Rites and Sacraments   Type Anointing   Assessment/Intervention/Outcome   Assessment Hopeful   Intervention Empowerment

## 2024-03-21 NOTE — PLAN OF CARE
Problem: Safety - Adult  Goal: Free from fall injury  Outcome: Progressing  Pt will remain free of falls.  Pt is 1 assist with walker and gait belt     Problem: Pain  Goal: Verbalizes/displays adequate comfort level or baseline comfort level  Outcome: Progressing  Reposition frequently to alleviate pain.  Patient utilizes Tramadol and ice to relieve moderate pain.      Problem: Skin/Tissue Integrity  Goal: Absence of new skin breakdown  Description: 1.  Monitor for areas of redness and/or skin breakdown  2.  Assess vascular access sites hourly  3.  Every 4-6 hours minimum:  Change oxygen saturation probe site  4.  Every 4-6 hours:  If on nasal continuous positive airway pressure, respiratory therapy assess nares and determine need for appliance change or resting period.  Outcome: Progressing  Skin assessment every shift.  No new areas of skin breakdown      Problem: Cardiovascular - Adult  Goal: Absence of cardiac dysrhythmias or at baseline  Outcome: Progressing  Pt is currently in normal sinus rhythm

## 2024-03-21 NOTE — DISCHARGE INSTRUCTIONS
NO DRIVING UNTIL CLEARED BY PAPITO LOWRY MD.    Follow up with Dr. Kuhn in 5-6 weeks to discuss your atrial fibrillation management   Follow up with Dr. Romero in 4 weeks

## 2024-03-21 NOTE — PLAN OF CARE
Problem: Discharge Planning  Goal: Discharge to home or other facility with appropriate resources  3/21/2024 1234 by Coty Iqbal LISW  Note: Team conference held Thursday, 3/21. Recommendations of the team were explained to the patient by Dr Crespo and SW. Team is recommending that patient continue on acute inpatient rehab for PT and OT, with expected discharge date of Friday, 3/29. Following discharge, team is recommending outpatient PT. Care plan reviewed with patient.  Patient verbalized understanding of the plan of care and contributed to goal setting. SW to follow and maintain involvement in discharge planning.

## 2024-03-21 NOTE — PROGRESS NOTES
Pt has been resting in bed through out the night. Took a PRN Tramadol at HS and has not needed anything else all night.  She had her EKG this AM per order.

## 2024-03-21 NOTE — PROGRESS NOTES
Cardiology Progress Note      Patient:  Kirstin Estrada  YOB: 1945  MRN: [id number]   Acct: [id number]  Admit Date:  3/19/2024  Primary Cardiologist: none  Seen by dr. Romero    Per prior cardiology consult note-  CHIEF COMPLAINT: new onset atrial fibrillation from OIO     PMH: HTN, HLD, hyponatremia     HPI: This is a pleasant 78 y.o. female presents with new onset atrial fibrillation w/ RVR and hypotension during left total knee replacement w/ Dr. Beaulieu at [facility] 3/11/24. HR was noted to be 120-140s w/ SBP . Pt was given IVP lopressor and IV fluids post procedure.     Patient denies fever, chills, chest pain, shortness of breath, Isaac pain, nausea, vomiting, diarrhea, dysuria, hematuria, constipation, diarrhea.  Patient did state there was trace blood in her urine last month and was given ciprofloxacin by her primary care doctor for suspected UTI at that time.     Pt was atrial fibrillation w/ RVR on arrival to ED w/ . Pt received liter fluid bolus and self converted to NSR w/ HR 96.      BMP shows mild hyponatremia (chronic) and mild NAGMA. BNP 1012. Troponin 12. HFP shows elevated ALT and AST. TSH WNL. Etoh negative. CBC shows mild leukocytosis at 13.8. UDS positive for benzos and opiates. UA shows trace blood but not bacteria and negative nitrites/leukocytes.     Subjective (Events in last 24 hours):     Seen in follow up- back in NSR this am; no recurrence of palpitations overnight. No chest pain/SOB. Preventice monitor in place.       Objective:   BP (!) 145/77   Pulse 88   Temp 97.9 °F (36.6 °C) (Oral)   Resp 16   Ht 1.6 m (5' 3\")   Wt 63.5 kg (139 lb 15.9 oz)   SpO2 97%   BMI 24.80 kg/m²        TELEMETRY: SR     Physical Exam:  General Appearance: alert and oriented to person, place and time, in no acute distress  Cardiovascular: regular rhythm, normal S1 and S2, no murmurs, rubs, clicks, or gallops, distal pulses intact,   Pulmonary/Chest: clear bilaterally; no increased  --> converted to SR with VF 3/12   - back to AFB --> converted with IV cdz - increased BB -- back to AFB    - back to AFB ---> Plan ROSSI / CV  / flecainide - converted to SR    - Called back on 3/20 for recurrence of AF RVR; converted back to NSR    Preserved EF 55-60% per TTE March 2024  Mild MR     Chronic hyponatremia       Plan:  Amiodarone load 200mg BID for 1 week; followed by 200mg daily thereafter- orders placed (did receive IV amio load during inpatient stay on 3/18)  Continue BB/Eliquis  Has 30-d Preventice monitor in place   EP referral as outpatient to discuss ablation   Follow up with Dr. Romero in 4 weeks  Follow up with EP after event monitor (5-6 weeks)  Will follow PRN- please call if any recurrences of AF noted.          Electronically signed by LETICIA Gomez CNP on 3/21/2024 at 11:13 AM

## 2024-03-21 NOTE — PROGRESS NOTES
[facility]  Occupational Therapy  Daily Note  Time:   Time In: 0900  Time Out: 1030  Timed Code Treatment Minutes: 90 Minutes  Minutes: 90          Date: 3/21/2024  Patient Name: Kirstin Estrada,   Gender: female      Room: [facility]15/015-A  MRN: [id number]  : 1945  (78 y.o.)  Referring Practitioner: Dr KATIE Crespo  Diagnosis: History of total knee arthroplasty  Additional Pertinent Hx: Kirstin Estrada is a 78 y.o. female with PMH significant for hypertension, hyperlipidemia, and hyponatremia who was admitted to [facility] on 3/11/2024.  Patient initially presented to [facility] ED on 3/11/2024 from [facility] for evaluation of new onset A-fib with RVR and hypotension during procedure.  Per report, patient underwent a left total knee replacement by Dr. Salazar at [facility] on 3/11/2024.  Patient reportedly tolerated the procedure well, however, after the patient procedure she was noted to have new onset of A-fib with RVR along with hypotension.  Patient was given beta-blocker and IV fluids and transferred to [facility] for further evaluation management.  On arrival to ED, patient's heart rate noted to be in the 130s.   On admission Ortho and cardiology were consulted.  Per Ortho, patient is WBAT to left lower extremity.   Cardiology evaluated patient.  Patient noted to have an elevated BNP which was thought to likely be related to A-fib.  Recommending a 30-day event monitor to determine burden and need for chronic OAC.     During acute care stay, patient was in and out of A-fib with RVR.  On multiple occasions, plan was to pursue cardioversion, however, patient will then return to normal sinus rhythm.  Patient was started on Eliquis 5 mg twice daily.  Additionally, beta-blocker was increased.  Patient was transitioned from IV amiodarone to amiodarone 200 mg daily.  Patient will need close outpatient follow-up with cardiology.  Pt with rapid response on evening of 3/19 and  Assistance.  Increased time required, good balance with hygiene after BM  Toilet Transfer: Stand By Assistance. To/from STS with use of B grab bars .    IADL:   Meal Preparation: Patient completed IADL homemaking task this date of frying and egg - task was graded to challenge balance, item retrieval/transport, safety, and endurance.  Patient was able to retrieve all items from graded cabinet heights with good balance and safety endurance, able to transport across kitchen with utilization of walker bag and CGA for balance. Patient stood for task completion with good balance and endurance of 8min.      Laundry: Patient completed IADL laundry task that facilitated retrieving linens from graded planes and heights, including floor level, to challenge RW safety, standing endurance / balance. Pt able to complete task by bending over and with use of reacher. Pt was then able to stand and fold linens with BUE release and no LOB. Pt required CGA - SBA throughout task and demo'ed and endurance of 10 minutes. Skilled education completed on RW safety techniques and fall prevention strategies with patient demo'ing good carry over, requiring additional min cues for RW safety.       BALANCE:  Sitting Balance:  Modified Independent.    Standing Balance: Stand By Assistance. With 1-2 UE release and good balance throughout    BED MOBILITY:  Not Tested    TRANSFERS:  Sit to Stand:  Stand By Assistance.    Stand to Sit: Stand By Assistance.      FUNCTIONAL MOBILITY:  Assistive Device: Rolling Walker  Assist Level:  Stand By Assistance and Contact Guard Assistance.   Distance: To and from bathroom and within apartment  Slow pace, limited Wbing noted through LLE d/t increased pain      EXERCISE:  Patient completed BUE strengthening exercises with skilled education on HEP: completed x10 reps x1 set with a 3# dowel arnie in all joints and all planes in order to improve UE strength and activity tolerance required for BADL routine and toilet /

## 2024-03-22 LAB
ANION GAP SERPL CALC-SCNC: 13 MEQ/L (ref 8–16)
BUN SERPL-MCNC: 16 MG/DL (ref 7–22)
CALCIUM SERPL-MCNC: 8.9 MG/DL (ref 8.5–10.5)
CHLORIDE SERPL-SCNC: 92 MEQ/L (ref 98–111)
CO2 SERPL-SCNC: 25 MEQ/L (ref 23–33)
CREAT SERPL-MCNC: 0.6 MG/DL (ref 0.4–1.2)
GFR SERPL CREATININE-BSD FRML MDRD: > 60 ML/MIN/1.73M2
GLUCOSE SERPL-MCNC: 92 MG/DL (ref 70–108)
POTASSIUM SERPL-SCNC: 4.8 MEQ/L (ref 3.5–5.2)
SODIUM SERPL-SCNC: 130 MEQ/L (ref 135–145)

## 2024-03-22 PROCEDURE — 80048 BASIC METABOLIC PNL TOTAL CA: CPT

## 2024-03-22 PROCEDURE — 97110 THERAPEUTIC EXERCISES: CPT

## 2024-03-22 PROCEDURE — 6370000000 HC RX 637 (ALT 250 FOR IP): Performed by: STUDENT IN AN ORGANIZED HEALTH CARE EDUCATION/TRAINING PROGRAM

## 2024-03-22 PROCEDURE — 6370000000 HC RX 637 (ALT 250 FOR IP): Performed by: REGISTERED NURSE

## 2024-03-22 PROCEDURE — 36415 COLL VENOUS BLD VENIPUNCTURE: CPT

## 2024-03-22 PROCEDURE — 97535 SELF CARE MNGMENT TRAINING: CPT

## 2024-03-22 PROCEDURE — 97116 GAIT TRAINING THERAPY: CPT

## 2024-03-22 PROCEDURE — 99232 SBSQ HOSP IP/OBS MODERATE 35: CPT | Performed by: STUDENT IN AN ORGANIZED HEALTH CARE EDUCATION/TRAINING PROGRAM

## 2024-03-22 PROCEDURE — 1180000000 HC REHAB R&B

## 2024-03-22 PROCEDURE — 97530 THERAPEUTIC ACTIVITIES: CPT

## 2024-03-22 RX ADMIN — APIXABAN 5 MG: 5 TABLET, FILM COATED ORAL at 20:11

## 2024-03-22 RX ADMIN — METOPROLOL TARTRATE 25 MG: 25 TABLET, FILM COATED ORAL at 09:49

## 2024-03-22 RX ADMIN — Medication 1 TABLET: at 09:49

## 2024-03-22 RX ADMIN — Medication 400 MG: at 09:50

## 2024-03-22 RX ADMIN — DICLOFENAC SODIUM 4 G: 10 GEL TOPICAL at 09:51

## 2024-03-22 RX ADMIN — TRAMADOL HYDROCHLORIDE 100 MG: 50 TABLET ORAL at 07:41

## 2024-03-22 RX ADMIN — TRAMADOL HYDROCHLORIDE 50 MG: 50 TABLET ORAL at 20:16

## 2024-03-22 RX ADMIN — DICLOFENAC SODIUM 4 G: 10 GEL TOPICAL at 20:11

## 2024-03-22 RX ADMIN — METOPROLOL TARTRATE 25 MG: 25 TABLET, FILM COATED ORAL at 20:11

## 2024-03-22 RX ADMIN — APIXABAN 5 MG: 5 TABLET, FILM COATED ORAL at 09:50

## 2024-03-22 RX ADMIN — AMIODARONE HYDROCHLORIDE 200 MG: 200 TABLET ORAL at 09:50

## 2024-03-22 RX ADMIN — AMIODARONE HYDROCHLORIDE 200 MG: 200 TABLET ORAL at 20:11

## 2024-03-22 RX ADMIN — DOCUSATE SODIUM 100 MG: 100 CAPSULE, LIQUID FILLED ORAL at 09:49

## 2024-03-22 ASSESSMENT — PAIN DESCRIPTION - DESCRIPTORS
DESCRIPTORS: ACHING
DESCRIPTORS: ACHING;DISCOMFORT

## 2024-03-22 ASSESSMENT — PAIN SCALES - GENERAL
PAINLEVEL_OUTOF10: 7
PAINLEVEL_OUTOF10: 3
PAINLEVEL_OUTOF10: 0
PAINLEVEL_OUTOF10: 2

## 2024-03-22 ASSESSMENT — PAIN DESCRIPTION - ORIENTATION
ORIENTATION: LEFT
ORIENTATION: LEFT

## 2024-03-22 ASSESSMENT — PAIN - FUNCTIONAL ASSESSMENT
PAIN_FUNCTIONAL_ASSESSMENT: ACTIVITIES ARE NOT PREVENTED
PAIN_FUNCTIONAL_ASSESSMENT: ACTIVITIES ARE NOT PREVENTED

## 2024-03-22 ASSESSMENT — PAIN DESCRIPTION - LOCATION
LOCATION: KNEE
LOCATION: KNEE

## 2024-03-22 NOTE — PROGRESS NOTES
Plan:  (5x/wk 90 min)    Patient Education  Patient Education: Plan of Care, Functional Mobility, Reviewed Prior Education, Health Promotion and Wellness Education, Safety, Verbal Exercise Instruction,  - Patient Verbalized Understanding    Goals:  Patient Goals : None stated  Short Term Goals  Time Frame for Short Term Goals: 1 week  Short Term Goal 1: Patient to perform supine<>sit with SBA in order to assist with getting into and out of bed.  Short Term Goal 2: Patient to perform sit<>stand transfers with use of RW and Supervision in order to assist with safety with transfers in home.  Short Term Goal 3: Patient to ambulate >/=30 feet with use of RW and SBA in order to assist with home mobility.  Short Term Goal 4: Patient to ascend/descend 1 steps with B handrails and CGA in order to assist with home entry.  Short Term Goal 5: Patient to demonstrated increased L knee flexion ROM to >/=90 degrees in order to assist with increased ease with transfers and mobility.  Long Term Goals  Time Frame for Long Term Goals : 2 weeks from IPR evaluation  Long Term Goal 1: Patient to perform supine<>sit with Mod I in order to assist with getting into and out of bed.  Long Term Goal 2: Patient to perform sit<>stand transfers with use of RW and Mod I in order to assist with safety with transfers in home.  Long Term Goal 3: Patient to ambulate >/=100 feet with use of RW and Mod I in order to assist with home and community mobility.  Long Term Goal 4: Patient to perform car transfer with Supervision in order to assist with getting to and from appointments.  Long Term Goal 5: Patient to score >/=19/28 on the Tinetti in order to reduce risk for falls.  Additional Goals?: Yes  Long term goal 6: Patient to demonstrated increased L knee flexion ROM to >/=100 degrees in order to assist with functional mobility.    Following session, patient left in safe position with all fall risk precautions in place.

## 2024-03-22 NOTE — PROGRESS NOTES
[facility]  INPATIENT PHYSICAL THERAPY  [facility] - 8K-15/015-A  Daily Note    Time In: 1000  Time Out: 1100  Timed Code Treatment Minutes: 60 Minutes  Minutes: 60        Date: 3/22/2024  Patient Name: Kirstin Estrada,  Gender:  female        MRN: [id number]  : 1945  (78 y.o.)     Referring Practitioner: Beth Crespo DO  Diagnosis: History of Total knee arthroplasty, left  Additional Pertinent Hx: Kirstin Estrada is a 78 y.o. female with PMH significant for hypertension, hyperlipidemia, and hyponatremia who was admitted to [facility] on 3/11/2024.  Patient initially presented to [facility] ED on 3/11/2024 from [facility] for evaluation of new onset A-fib with RVR and hypotension during procedure.  Per report, patient underwent a left total knee replacement by Dr. Salazar at [facility] on 3/11/2024.  Patient reportedly tolerated the procedure well, however, after the patient procedure she was noted to have new onset of A-fib with RVR along with hypotension.  Patient was given beta-blocker and IV fluids and transferred to [facility] for further evaluation management.  On arrival to ED, patient's heart rate noted to be in the 130s.   On admission Ortho and cardiology were consulted.  Per Ortho, patient is WBAT to left lower extremity.   Cardiology evaluated patient.  Patient noted to have an elevated BNP which was thought to likely be related to A-fib.  Recommending a 30-day event monitor to determine burden and need for chronic OAC.     During acute care stay, patient was in and out of A-fib with RVR.  On multiple occasions, plan was to pursue cardioversion, however, patient will then return to normal sinus rhythm.  Patient was started on Eliquis 5 mg twice daily.  Additionally, beta-blocker was increased.  Patient was transitioned from IV amiodarone to amiodarone 200 mg daily.  Patient will need close outpatient follow-up with cardiology.  Pt with rapid response on evening of

## 2024-03-22 NOTE — PROGRESS NOTES
[facility]  Occupational Therapy  Daily Note  Time:   Time In: 0730  Time Out: 0900  Timed Code Treatment Minutes: 90 Minutes  Minutes: 90          Date: 3/22/2024  Patient Name: Kirstin Estrada,   Gender: female      Room: [facility]15/015-A  MRN: [id number]  : 1945  (78 y.o.)  Referring Practitioner: Dr KATIE Crespo  Diagnosis: History of total knee arthroplasty  Additional Pertinent Hx: Kirstin Estrada is a 78 y.o. female with PMH significant for hypertension, hyperlipidemia, and hyponatremia who was admitted to [facility] on 3/11/2024.  Patient initially presented to [facility] ED on 3/11/2024 from [facility] for evaluation of new onset A-fib with RVR and hypotension during procedure.  Per report, patient underwent a left total knee replacement by Dr. Salazar at [facility] on 3/11/2024.  Patient reportedly tolerated the procedure well, however, after the patient procedure she was noted to have new onset of A-fib with RVR along with hypotension.  Patient was given beta-blocker and IV fluids and transferred to [facility] for further evaluation management.  On arrival to ED, patient's heart rate noted to be in the 130s.   On admission Ortho and cardiology were consulted.  Per Ortho, patient is WBAT to left lower extremity.   Cardiology evaluated patient.  Patient noted to have an elevated BNP which was thought to likely be related to A-fib.  Recommending a 30-day event monitor to determine burden and need for chronic OAC.     During acute care stay, patient was in and out of A-fib with RVR.  On multiple occasions, plan was to pursue cardioversion, however, patient will then return to normal sinus rhythm.  Patient was started on Eliquis 5 mg twice daily.  Additionally, beta-blocker was increased.  Patient was transitioned from IV amiodarone to amiodarone 200 mg daily.  Patient will need close outpatient follow-up with cardiology.  Pt with rapid response on evening of 3/19 and  assistance to place stocking on BLE.    -Toileting: Supervision, Stand By Assistance during clothing management.    -Toilet Transfer: Stand By Assistance. Utilizing normal level toilet seat  -Shower Transfer: Stand By Assistance.  Utilizing shower chair.    Contact Guard Assistance when fatigued.     IADL:   -Basic Housekeeping: Pt participated in basic housekeeping task of collecting items in household areas to increase standing endurance, standing balance, and unilateral release. Pt utilized RW to navigate around functional apartment to retrieve 10/10 items from various areas around the functional apartment. Educated about a walker tray vs. Walker bag    BALANCE:  Sitting Balance:  Supervision.   Standing Balance: Stand By Assistance to Contact Guard Assistance when fatigued.     BED MOBILITY:  Rolling to Left: Stand By Assistance    Sit to Supine: Stand By Assistance      TRANSFERS:  Sit to Stand:  Stand By Assistance, Contact Guard Assistance when fatigued; from bed, w/c, recliner chair, regular toilet seat.  Stand to Sit: Stand By Assistance, Contact Guard Assistance when fatigued.    FUNCTIONAL MOBILITY:  Assistive Device: Rolling Walker  Assist Level:  Stand By Assistance and Contact Guard Assistance when fatigued.   Distance: To and from bathroom, Within room, and around functional therapy apartment       ADDITIONAL ACTIVITIES:  Pt participated in BUE strengthening exercises in order to improve home mgt tasks. Pt utilized a 2 lb weight x10 reps in all joints and quadrants. Tolerated well.       Modified Meagher Scale:  Not Applicable    ASSESSMENT:     Activity Tolerance:  Patient tolerance of  treatment: Good treatment tolerance      Discharge Recommendations: Home with Outpatient OT  Equipment Recommendations: Other: Pt is able to borrow a shower chair.  Pt purchased a [employer] kit.  Plan: Times Per Week: 5x/wk for 90 min  Current Treatment Recommendations: Strengthening, Balance training, Functional mobility

## 2024-03-22 NOTE — PLAN OF CARE
Problem: Discharge Planning  Goal: Discharge to home or other facility with appropriate resources  Outcome: Progressing  Flowsheets (Taken 3/22/2024 1128)  Discharge to home or other facility with appropriate resources:   Identify barriers to discharge with patient and caregiver   Identify discharge learning needs (meds, wound care, etc)     Problem: Safety - Adult  Goal: Free from fall injury  Outcome: Progressing  Flowsheets (Taken 3/22/2024 1128)  Free From Fall Injury: Instruct family/caregiver on patient safety     Problem: Pain  Goal: Verbalizes/displays adequate comfort level or baseline comfort level  Outcome: Progressing  Flowsheets (Taken 3/22/2024 1128)  Verbalizes/displays adequate comfort level or baseline comfort level:   Assess pain using appropriate pain scale   Implement non-pharmacological measures as appropriate and evaluate response

## 2024-03-23 PROCEDURE — 97116 GAIT TRAINING THERAPY: CPT

## 2024-03-23 PROCEDURE — 97535 SELF CARE MNGMENT TRAINING: CPT

## 2024-03-23 PROCEDURE — 97530 THERAPEUTIC ACTIVITIES: CPT

## 2024-03-23 PROCEDURE — 6370000000 HC RX 637 (ALT 250 FOR IP): Performed by: STUDENT IN AN ORGANIZED HEALTH CARE EDUCATION/TRAINING PROGRAM

## 2024-03-23 PROCEDURE — 97110 THERAPEUTIC EXERCISES: CPT

## 2024-03-23 PROCEDURE — 1180000000 HC REHAB R&B

## 2024-03-23 PROCEDURE — 6370000000 HC RX 637 (ALT 250 FOR IP): Performed by: REGISTERED NURSE

## 2024-03-23 RX ADMIN — Medication 1 TABLET: at 09:30

## 2024-03-23 RX ADMIN — DOCUSATE SODIUM 100 MG: 100 CAPSULE, LIQUID FILLED ORAL at 09:29

## 2024-03-23 RX ADMIN — TRAMADOL HYDROCHLORIDE 50 MG: 50 TABLET ORAL at 20:09

## 2024-03-23 RX ADMIN — TRAMADOL HYDROCHLORIDE 50 MG: 50 TABLET ORAL at 06:09

## 2024-03-23 RX ADMIN — DICLOFENAC SODIUM 4 G: 10 GEL TOPICAL at 09:29

## 2024-03-23 RX ADMIN — AMIODARONE HYDROCHLORIDE 200 MG: 200 TABLET ORAL at 09:30

## 2024-03-23 RX ADMIN — METOPROLOL TARTRATE 25 MG: 25 TABLET, FILM COATED ORAL at 20:04

## 2024-03-23 RX ADMIN — TRAMADOL HYDROCHLORIDE 50 MG: 50 TABLET ORAL at 12:32

## 2024-03-23 RX ADMIN — AMIODARONE HYDROCHLORIDE 200 MG: 200 TABLET ORAL at 20:01

## 2024-03-23 RX ADMIN — Medication 400 MG: at 09:30

## 2024-03-23 RX ADMIN — DICLOFENAC SODIUM 4 G: 10 GEL TOPICAL at 20:05

## 2024-03-23 RX ADMIN — APIXABAN 5 MG: 5 TABLET, FILM COATED ORAL at 09:29

## 2024-03-23 RX ADMIN — APIXABAN 5 MG: 5 TABLET, FILM COATED ORAL at 20:04

## 2024-03-23 RX ADMIN — METOPROLOL TARTRATE 25 MG: 25 TABLET, FILM COATED ORAL at 09:30

## 2024-03-23 ASSESSMENT — PAIN DESCRIPTION - PAIN TYPE: TYPE: ACUTE PAIN

## 2024-03-23 ASSESSMENT — PAIN DESCRIPTION - LOCATION
LOCATION: KNEE

## 2024-03-23 ASSESSMENT — PAIN SCALES - GENERAL
PAINLEVEL_OUTOF10: 4
PAINLEVEL_OUTOF10: 5
PAINLEVEL_OUTOF10: 2
PAINLEVEL_OUTOF10: 5
PAINLEVEL_OUTOF10: 5

## 2024-03-23 ASSESSMENT — PAIN DESCRIPTION - DESCRIPTORS
DESCRIPTORS: ACHING;NAGGING
DESCRIPTORS: ACHING;DISCOMFORT
DESCRIPTORS: ACHING;DISCOMFORT

## 2024-03-23 ASSESSMENT — PAIN DESCRIPTION - ORIENTATION
ORIENTATION: LEFT

## 2024-03-23 ASSESSMENT — PAIN DESCRIPTION - ONSET: ONSET: ON-GOING

## 2024-03-23 NOTE — PROGRESS NOTES
[facility]  INPATIENT PHYSICAL THERAPY  [facility] - 8K-15/015-A  Daily Note    Time In: 1000  Time Out: 1030  Timed Code Treatment Minutes: 30 Minutes  Minutes: 30          Date: 3/23/2024  Patient Name: Kirstin Estrada,  Gender:  female        MRN: [id number]  : 1945  (78 y.o.)     Referring Practitioner: Beth Crespo DO  Diagnosis: History of Total knee arthroplasty, left  Additional Pertinent Hx: Kirstin Estrada is a 78 y.o. female with PMH significant for hypertension, hyperlipidemia, and hyponatremia who was admitted to [facility] on 3/11/2024.  Patient initially presented to [facility] ED on 3/11/2024 from [facility] for evaluation of new onset A-fib with RVR and hypotension during procedure.  Per report, patient underwent a left total knee replacement by Dr. Salazar at [facility] on 3/11/2024.  Patient reportedly tolerated the procedure well, however, after the patient procedure she was noted to have new onset of A-fib with RVR along with hypotension.  Patient was given beta-blocker and IV fluids and transferred to [facility] for further evaluation management.  On arrival to ED, patient's heart rate noted to be in the 130s.   On admission Ortho and cardiology were consulted.  Per Ortho, patient is WBAT to left lower extremity.   Cardiology evaluated patient.  Patient noted to have an elevated BNP which was thought to likely be related to A-fib.  Recommending a 30-day event monitor to determine burden and need for chronic OAC.     During acute care stay, patient was in and out of A-fib with RVR.  On multiple occasions, plan was to pursue cardioversion, however, patient will then return to normal sinus rhythm.  Patient was started on Eliquis 5 mg twice daily.  Additionally, beta-blocker was increased.  Patient was transitioned from IV amiodarone to amiodarone 200 mg daily.  Patient will need close outpatient follow-up with cardiology.  Pt with rapid response on evening of

## 2024-03-23 NOTE — PROGRESS NOTES
[facility]  INPATIENT PHYSICAL THERAPY  [facility] - 8K-15/015-A  Daily Note    Time In: 1330  Time Out: 1430  Timed Code Treatment Minutes: 60 Minutes  Minutes: 60          Date: 3/23/2024  Patient Name: Kirstin Estrada,  Gender:  female        MRN: [id number]  : 1945  (78 y.o.)     Referring Practitioner: Beth Crespo DO  Diagnosis: History of Total knee arthroplasty, left  Additional Pertinent Hx: Kirstin Estrada is a 78 y.o. female with PMH significant for hypertension, hyperlipidemia, and hyponatremia who was admitted to [facility] on 3/11/2024.  Patient initially presented to [facility] ED on 3/11/2024 from [facility] for evaluation of new onset A-fib with RVR and hypotension during procedure.  Per report, patient underwent a left total knee replacement by Dr. Salazar at [facility] on 3/11/2024.  Patient reportedly tolerated the procedure well, however, after the patient procedure she was noted to have new onset of A-fib with RVR along with hypotension.  Patient was given beta-blocker and IV fluids and transferred to [facility] for further evaluation management.  On arrival to ED, patient's heart rate noted to be in the 130s.   On admission Ortho and cardiology were consulted.  Per Ortho, patient is WBAT to left lower extremity.   Cardiology evaluated patient.  Patient noted to have an elevated BNP which was thought to likely be related to A-fib.  Recommending a 30-day event monitor to determine burden and need for chronic OAC.     During acute care stay, patient was in and out of A-fib with RVR.  On multiple occasions, plan was to pursue cardioversion, however, patient will then return to normal sinus rhythm.  Patient was started on Eliquis 5 mg twice daily.  Additionally, beta-blocker was increased.  Patient was transitioned from IV amiodarone to amiodarone 200 mg daily.  Patient will need close outpatient follow-up with cardiology.  Pt with rapid response on evening of  training, Therapeutic activities, Safety education & training, Transfer training, Patient/Caregiver education & training, Equipment evaluation, education, & procurement, Balance training, Endurance training, Neuromuscular re-education  General Plan:  (5x/wk 90 min)    Patient Education  Patient Education: Plan of Care, Functional Mobility, Reviewed Prior Education, Health Promotion and Wellness Education, Safety, Verbal Exercise Instruction, Bed Mobility, Transfers, Gait    Goals:  Patient Goals : None stated  Short Term Goals  Time Frame for Short Term Goals: 1 week  Short Term Goal 1: Patient to perform supine<>sit with SBA in order to assist with getting into and out of bed.  Short Term Goal 2: Patient to perform sit<>stand transfers with use of RW and Supervision in order to assist with safety with transfers in home.  Short Term Goal 3: Patient to ambulate >/=30 feet with use of RW and SBA in order to assist with home mobility.  Short Term Goal 4: Patient to ascend/descend 1 steps with B handrails and CGA in order to assist with home entry.  Short Term Goal 5: Patient to demonstrated increased L knee flexion ROM to >/=90 degrees in order to assist with increased ease with transfers and mobility.  Long Term Goals  Time Frame for Long Term Goals : 2 weeks from IPR evaluation  Long Term Goal 1: Patient to perform supine<>sit with Mod I in order to assist with getting into and out of bed.  Long Term Goal 2: Patient to perform sit<>stand transfers with use of RW and Mod I in order to assist with safety with transfers in home.  Long Term Goal 3: Patient to ambulate >/=100 feet with use of RW and Mod I in order to assist with home and community mobility.  Long Term Goal 4: Patient to perform car transfer with Supervision in order to assist with getting to and from appointments.  Long Term Goal 5: Patient to score >/=19/28 on the Tinetti in order to reduce risk for falls.  Additional Goals?: Yes  Long term goal 6:

## 2024-03-23 NOTE — PLAN OF CARE
Problem: Safety - Adult  Goal: Free from fall injury  3/22/2024 2337 by Desiree Mclean, RN  Outcome: Progressing  Flowsheets (Taken 3/22/2024 2337)  Free From Fall Injury: Instruct family/caregiver on patient safety     Problem: ABCDS Injury Assessment  Goal: Absence of physical injury  Outcome: Progressing  Flowsheets (Taken 3/22/2024 2337)  Absence of Physical Injury: Implement safety measures based on patient assessment     Problem: Pain  Goal: Verbalizes/displays adequate comfort level or baseline comfort level  3/22/2024 2337 by Desiree Mclean, RN  Outcome: Progressing  Flowsheets (Taken 3/22/2024 2337)  Verbalizes/displays adequate comfort level or baseline comfort level:   Assess pain using appropriate pain scale   Encourage patient to monitor pain and request assistance

## 2024-03-23 NOTE — PROGRESS NOTES
Patient: Kirstin Estrada  Unit/Bed: 8K-15/015-A  YOB: 1945  MRN: [id number] Acct: [id number]   Admitting Diagnosis: History of total knee arthroplasty, left [Z96.652]  Admit Date:  3/19/2024  Hospital Day: 3    Assessment:     Principal Problem:    History of total knee arthroplasty, left  Resolved Problems:    * No resolved hospital problems. *      Plan:     The sodium seems to be at her baseline.        Subjective:     Patient has no complaint of CP or SOB..   Medication side effects: none    Scheduled Meds:   [START ON 3/27/2024] amiodarone  200 mg Oral Daily    docusate sodium  100 mg Oral BID    diclofenac sodium  4 g Topical BID    amiodarone  200 mg Oral BID    metoprolol tartrate  25 mg Oral BID    multivitamin  1 tablet Oral Daily    sodium chloride flush  5-40 mL IntraVENous 2 times per day    apixaban  5 mg Oral BID    magnesium oxide  400 mg Oral Daily     Continuous Infusions:   sodium chloride       PRN Meds:sodium chloride flush, sodium chloride, polyethylene glycol, potassium chloride **OR** potassium alternative oral replacement **OR** potassium chloride, magnesium sulfate, melatonin, traMADol **OR** traMADol, acetaminophen, ondansetron    Review of Systems  Pertinent items are noted in HPI.    Objective:     Patient Vitals for the past 8 hrs:   BP Temp Temp src Pulse Resp SpO2   03/22/24 2008 107/88 98.2 °F (36.8 °C) Oral 89 17 97 %     I/O last 3 completed shifts:  In: 1275 [P.O.:1275]  Out: -   No intake/output data recorded.    /88   Pulse 89   Temp 98.2 °F (36.8 °C) (Oral)   Resp 17   Ht 1.6 m (5' 3\")   Wt 64 kg (141 lb 1.5 oz)   SpO2 97%   BMI 24.99 kg/m²     General appearance: alert, appears stated age, and cooperative  Head: Normocephalic, without obvious abnormality, atraumatic  Lungs: clear to auscultation bilaterally  Heart: regular rate and rhythm, S1, S2 normal, no murmur, click, rub or gallop  Abdomen: soft, non-tender; bowel sounds normal; no masses,  no

## 2024-03-23 NOTE — PROGRESS NOTES
[facility]  Occupational Therapy  Daily Note  Time:   Time In: 0700  Time Out: 0830  Timed Code Treatment Minutes: 90 Minutes  Minutes: 90          Date: 3/23/2024  Patient Name: Kirstin Estrada,   Gender: female      Room: [facility]15/015-A  MRN: [id number]  : 1945  (78 y.o.)  Referring Practitioner: Dr KATIE Crespo  Diagnosis: History of total knee arthroplasty  Additional Pertinent Hx: Kirstin Estrada is a 78 y.o. female with PMH significant for hypertension, hyperlipidemia, and hyponatremia who was admitted to [facility] on 3/11/2024.  Patient initially presented to [facility] ED on 3/11/2024 from [facility] for evaluation of new onset A-fib with RVR and hypotension during procedure.  Per report, patient underwent a left total knee replacement by Dr. Salazar at [facility] on 3/11/2024.  Patient reportedly tolerated the procedure well, however, after the patient procedure she was noted to have new onset of A-fib with RVR along with hypotension.  Patient was given beta-blocker and IV fluids and transferred to [facility] for further evaluation management.  On arrival to ED, patient's heart rate noted to be in the 130s.   On admission Ortho and cardiology were consulted.  Per Ortho, patient is WBAT to left lower extremity.   Cardiology evaluated patient.  Patient noted to have an elevated BNP which was thought to likely be related to A-fib.  Recommending a 30-day event monitor to determine burden and need for chronic OAC.     During acute care stay, patient was in and out of A-fib with RVR.  On multiple occasions, plan was to pursue cardioversion, however, patient will then return to normal sinus rhythm.  Patient was started on Eliquis 5 mg twice daily.  Additionally, beta-blocker was increased.  Patient was transitioned from IV amiodarone to amiodarone 200 mg daily.  Patient will need close outpatient follow-up with cardiology.  Pt with rapid response on evening of 3/19 and  found to be in Afib with RVR . Medications adjusted and patient approved to continue rehab.    Restrictions/Precautions:  Restrictions/Precautions: Weight Bearing, Fall Risk  Left Lower Extremity Weight Bearing: Weight Bearing As Tolerated  Position Activity Restriction  Other position/activity restrictions: Monitor HR     Social/Functional History:  Lives With: Alone  Type of Home: House  Home Layout: One level, Work area in basement, Able to Live on Main level with bedroom/bathroom  Home Access: Stairs to enter without rails  Entrance Stairs - Number of Steps: 1  Home Equipment: Cane, Walker, standard, Reacher, Sock aid   Bathroom Shower/Tub: Walk-in shower  Bathroom Toilet: Standard  Bathroom Accessibility: Walker accessible    Receives Help From: Family  ADL Assistance: Independent  Homemaking Assistance: Independent  Ambulation Assistance: Independent  Transfer Assistance: Independent    Active : Yes  Occupation: Retired  Type of Occupation: RN at [employer]  Additional Comments: Pt fully I prior to admission; son is close by to assist and daughter lives near [place].    SUBJECTIVE: Pt. In room laying in bed pleasant and agreeable to participate.     PAIN: No pain reported    Vitals: Vitals not assessed per clinical judgement, see nursing flowsheet    COGNITION: WNL    ADL:   Grooming: Stand By Assistance.  To stand at sink to brush hair and to complete oral care   Bathing: Stand By Assistance.  To complete a shower while seated on bench SBA with use of grab bar for standing portions.   Upper Extremity Dressing: Stand By Assistance.  To doff and don shirt and bra  Lower Extremity Dressing: Stand By Assistance.  Seated from toilet   Footwear Management: Stand By Assistance.  To don slippers and total A to don farhat hose on the RLE and Mod A to don on LLE, SBA to don and doff socks  Toileting: Stand By Assistance.     Toilet Transfer: Stand By Assistance.    Shower Transfer: Stand By Assistance.  Walk in

## 2024-03-24 PROCEDURE — 6370000000 HC RX 637 (ALT 250 FOR IP): Performed by: STUDENT IN AN ORGANIZED HEALTH CARE EDUCATION/TRAINING PROGRAM

## 2024-03-24 PROCEDURE — 6370000000 HC RX 637 (ALT 250 FOR IP): Performed by: REGISTERED NURSE

## 2024-03-24 PROCEDURE — 1180000000 HC REHAB R&B

## 2024-03-24 RX ADMIN — METOPROLOL TARTRATE 25 MG: 25 TABLET, FILM COATED ORAL at 09:41

## 2024-03-24 RX ADMIN — Medication 400 MG: at 09:41

## 2024-03-24 RX ADMIN — TRAMADOL HYDROCHLORIDE 50 MG: 50 TABLET ORAL at 09:47

## 2024-03-24 RX ADMIN — DICLOFENAC SODIUM 4 G: 10 GEL TOPICAL at 20:04

## 2024-03-24 RX ADMIN — DOCUSATE SODIUM 100 MG: 100 CAPSULE, LIQUID FILLED ORAL at 20:01

## 2024-03-24 RX ADMIN — DOCUSATE SODIUM 100 MG: 100 CAPSULE, LIQUID FILLED ORAL at 09:41

## 2024-03-24 RX ADMIN — APIXABAN 5 MG: 5 TABLET, FILM COATED ORAL at 20:02

## 2024-03-24 RX ADMIN — AMIODARONE HYDROCHLORIDE 200 MG: 200 TABLET ORAL at 20:01

## 2024-03-24 RX ADMIN — APIXABAN 5 MG: 5 TABLET, FILM COATED ORAL at 09:41

## 2024-03-24 RX ADMIN — TRAMADOL HYDROCHLORIDE 50 MG: 50 TABLET ORAL at 22:22

## 2024-03-24 RX ADMIN — Medication 1 TABLET: at 09:41

## 2024-03-24 RX ADMIN — METOPROLOL TARTRATE 25 MG: 25 TABLET, FILM COATED ORAL at 20:01

## 2024-03-24 RX ADMIN — AMIODARONE HYDROCHLORIDE 200 MG: 200 TABLET ORAL at 09:41

## 2024-03-24 ASSESSMENT — PAIN DESCRIPTION - FREQUENCY: FREQUENCY: CONTINUOUS

## 2024-03-24 ASSESSMENT — PAIN DESCRIPTION - LOCATION
LOCATION: KNEE
LOCATION: KNEE

## 2024-03-24 ASSESSMENT — PAIN DESCRIPTION - ORIENTATION
ORIENTATION: LEFT
ORIENTATION: LEFT

## 2024-03-24 ASSESSMENT — PAIN SCALES - GENERAL
PAINLEVEL_OUTOF10: 3
PAINLEVEL_OUTOF10: 7
PAINLEVEL_OUTOF10: 3
PAINLEVEL_OUTOF10: 2

## 2024-03-24 ASSESSMENT — PAIN DESCRIPTION - ONSET: ONSET: ON-GOING

## 2024-03-24 ASSESSMENT — PAIN DESCRIPTION - PAIN TYPE
TYPE: SURGICAL PAIN
TYPE: SURGICAL PAIN

## 2024-03-24 ASSESSMENT — PAIN DESCRIPTION - DESCRIPTORS
DESCRIPTORS: ACHING;SPASM
DESCRIPTORS: ACHING

## 2024-03-24 NOTE — PLAN OF CARE
Problem: Discharge Planning  Goal: Discharge to home or other facility with appropriate resources  Outcome: Progressing  Flowsheets (Taken 3/23/2024 1954)  Discharge to home or other facility with appropriate resources: Identify barriers to discharge with patient and caregiver     Problem: Safety - Adult  Goal: Free from fall injury  Outcome: Progressing     Problem: ABCDS Injury Assessment  Goal: Absence of physical injury  Outcome: Progressing     Problem: Pain  Goal: Verbalizes/displays adequate comfort level or baseline comfort level  Outcome: Progressing  Flowsheets (Taken 3/23/2024 2001)  Verbalizes/displays adequate comfort level or baseline comfort level: Assess pain using appropriate pain scale     Problem: Skin/Tissue Integrity  Goal: Absence of new skin breakdown  Description: 1.  Monitor for areas of redness and/or skin breakdown  2.  Assess vascular access sites hourly  3.  Every 4-6 hours minimum:  Change oxygen saturation probe site  4.  Every 4-6 hours:  If on nasal continuous positive airway pressure, respiratory therapy assess nares and determine need for appliance change or resting period.  Outcome: Progressing

## 2024-03-24 NOTE — CARE COORDINATION
Patient denied any concerns this shift. Left knee incision drsg changed and incision cleansed as ordered. Incision healing with staples intact without s/s of infection.

## 2024-03-24 NOTE — PLAN OF CARE
Problem: Discharge Planning  Goal: Discharge to home or other facility with appropriate resources  Outcome: Progressing  Flowsheets (Taken 3/23/2024 1954)  Discharge to home or other facility with appropriate resources: Identify barriers to discharge with patient and caregiver     Problem: Safety - Adult  Goal: Free from fall injury  Outcome: Progressing     Problem: ABCDS Injury Assessment  Goal: Absence of physical injury  Outcome: Progressing     Problem: Pain  Goal: Verbalizes/displays adequate comfort level or baseline comfort level  Outcome: Progressing  Flowsheets (Taken 3/23/2024 2001)  Verbalizes/displays adequate comfort level or baseline comfort level: Assess pain using appropriate pain scale     Problem: Skin/Tissue Integrity  Goal: Absence of new skin breakdown  Description: 1.  Monitor for areas of redness and/or skin breakdown  2.  Assess vascular access sites hourly  3.  Every 4-6 hours minimum:  Change oxygen saturation probe site  4.  Every 4-6 hours:  If on nasal continuous positive airway pressure, respiratory therapy assess nares and determine need for appliance change or resting period.  Outcome: Progressing     Problem: Cardiovascular - Adult  Goal: Maintains optimal cardiac output and hemodynamic stability  Outcome: Progressing  Flowsheets (Taken 3/23/2024 1954)  Maintains optimal cardiac output and hemodynamic stability: Monitor blood pressure and heart rate     Problem: Cardiovascular - Adult  Goal: Absence of cardiac dysrhythmias or at baseline  Outcome: Progressing

## 2024-03-24 NOTE — PROGRESS NOTES
Patient educated on how to use incentive spirometer. Patient verbalized understanding and demonstrated proper use. Emphasized importance and usage of device, with coughing and deep breathing every 8 hours while awake.

## 2024-03-24 NOTE — PLAN OF CARE
Problem: Discharge Planning  Goal: Discharge to home or other facility with appropriate resources  3/24/2024 1359 by Carin Suresh RN  Outcome: Progressing  Flowsheets (Taken 3/24/2024 1359)  Discharge to home or other facility with appropriate resources:   Identify barriers to discharge with patient and caregiver   Arrange for needed discharge resources and transportation as appropriate   Identify discharge learning needs (meds, wound care, etc)   Refer to discharge planning if patient needs post-hospital services based on physician order or complex needs related to functional status, cognitive ability or social support system  Note: Patients discharge is planned for 3/29 with outpatient PT  3/24/2024 0034 by Supriya Chang RN  Outcome: Progressing  Flowsheets (Taken 3/23/2024 1954)  Discharge to home or other facility with appropriate resources: Identify barriers to discharge with patient and caregiver     Problem: Safety - Adult  Goal: Free from fall injury  3/24/2024 1359 by Carin Suresh RN  Outcome: Progressing  Flowsheets (Taken 3/24/2024 1359)  Free From Fall Injury:   Instruct family/caregiver on patient safety   Based on caregiver fall risk screen, instruct family/caregiver to ask for assistance with transferring infant if caregiver noted to have fall risk factors  Note: Patient remains free from falls. Patient requires walker, gait belt and stand by assistance for safe transfers and ambulation.  3/24/2024 0034 by Supriya Chang RN  Outcome: Progressing     Problem: ABCDS Injury Assessment  Goal: Absence of physical injury  3/24/2024 1359 by Carin Suresh RN  Outcome: Progressing  Flowsheets (Taken 3/24/2024 1359)  Absence of Physical Injury: Implement safety measures based on patient assessment  Note: Patient continues to work with therapies.  3/24/2024 0034 by Supriya Chang RN  Outcome: Progressing     Problem: Pain  Goal: Verbalizes/displays adequate comfort level or baseline

## 2024-03-25 ENCOUNTER — APPOINTMENT (OUTPATIENT)
Dept: INTERVENTIONAL RADIOLOGY/VASCULAR | Age: 79
End: 2024-03-25
Attending: STUDENT IN AN ORGANIZED HEALTH CARE EDUCATION/TRAINING PROGRAM
Payer: MEDICARE

## 2024-03-25 LAB
ANION GAP SERPL CALC-SCNC: 11 MEQ/L (ref 8–16)
BASOPHILS ABSOLUTE: 0.1 THOU/MM3 (ref 0–0.1)
BASOPHILS NFR BLD AUTO: 0.6 %
BUN SERPL-MCNC: 17 MG/DL (ref 7–22)
CALCIUM SERPL-MCNC: 8.9 MG/DL (ref 8.5–10.5)
CHLORIDE SERPL-SCNC: 95 MEQ/L (ref 98–111)
CO2 SERPL-SCNC: 27 MEQ/L (ref 23–33)
CREAT SERPL-MCNC: 0.6 MG/DL (ref 0.4–1.2)
DEPRECATED RDW RBC AUTO: 42.8 FL (ref 35–45)
EOSINOPHIL NFR BLD AUTO: 2.9 %
EOSINOPHILS ABSOLUTE: 0.3 THOU/MM3 (ref 0–0.4)
ERYTHROCYTE [DISTWIDTH] IN BLOOD BY AUTOMATED COUNT: 12.1 % (ref 11.5–14.5)
GFR SERPL CREATININE-BSD FRML MDRD: > 90 ML/MIN/1.73M2
GLUCOSE SERPL-MCNC: 94 MG/DL (ref 70–108)
HCT VFR BLD AUTO: 33.8 % (ref 37–47)
HGB BLD-MCNC: 10.9 GM/DL (ref 12–16)
IMM GRANULOCYTES # BLD AUTO: 0.03 THOU/MM3 (ref 0–0.07)
IMM GRANULOCYTES NFR BLD AUTO: 0.3 %
LYMPHOCYTES ABSOLUTE: 1.5 THOU/MM3 (ref 1–4.8)
LYMPHOCYTES NFR BLD AUTO: 16.2 %
MCH RBC QN AUTO: 30.6 PG (ref 26–33)
MCHC RBC AUTO-ENTMCNC: 32.2 GM/DL (ref 32.2–35.5)
MCV RBC AUTO: 94.9 FL (ref 81–99)
MONOCYTES ABSOLUTE: 0.8 THOU/MM3 (ref 0.4–1.3)
MONOCYTES NFR BLD AUTO: 8.4 %
NEUTROPHILS NFR BLD AUTO: 71.6 %
NRBC BLD AUTO-RTO: 0 /100 WBC
PLATELET # BLD AUTO: 423 THOU/MM3 (ref 130–400)
PMV BLD AUTO: 8.2 FL (ref 9.4–12.4)
POTASSIUM SERPL-SCNC: 4.4 MEQ/L (ref 3.5–5.2)
RBC # BLD AUTO: 3.56 MILL/MM3 (ref 4.2–5.4)
SEGMENTED NEUTROPHILS ABSOLUTE COUNT: 6.5 THOU/MM3 (ref 1.8–7.7)
SODIUM SERPL-SCNC: 133 MEQ/L (ref 135–145)
WBC # BLD AUTO: 9.1 THOU/MM3 (ref 4.8–10.8)

## 2024-03-25 PROCEDURE — 97110 THERAPEUTIC EXERCISES: CPT

## 2024-03-25 PROCEDURE — 97535 SELF CARE MNGMENT TRAINING: CPT

## 2024-03-25 PROCEDURE — 36415 COLL VENOUS BLD VENIPUNCTURE: CPT

## 2024-03-25 PROCEDURE — 85025 COMPLETE CBC W/AUTO DIFF WBC: CPT

## 2024-03-25 PROCEDURE — 6370000000 HC RX 637 (ALT 250 FOR IP): Performed by: REGISTERED NURSE

## 2024-03-25 PROCEDURE — 97530 THERAPEUTIC ACTIVITIES: CPT

## 2024-03-25 PROCEDURE — 99232 SBSQ HOSP IP/OBS MODERATE 35: CPT | Performed by: STUDENT IN AN ORGANIZED HEALTH CARE EDUCATION/TRAINING PROGRAM

## 2024-03-25 PROCEDURE — 6370000000 HC RX 637 (ALT 250 FOR IP): Performed by: STUDENT IN AN ORGANIZED HEALTH CARE EDUCATION/TRAINING PROGRAM

## 2024-03-25 PROCEDURE — 80048 BASIC METABOLIC PNL TOTAL CA: CPT

## 2024-03-25 PROCEDURE — 97116 GAIT TRAINING THERAPY: CPT

## 2024-03-25 PROCEDURE — 97112 NEUROMUSCULAR REEDUCATION: CPT

## 2024-03-25 PROCEDURE — 93971 EXTREMITY STUDY: CPT

## 2024-03-25 PROCEDURE — 1180000000 HC REHAB R&B

## 2024-03-25 RX ADMIN — ACETAMINOPHEN 1000 MG: 500 TABLET ORAL at 08:15

## 2024-03-25 RX ADMIN — ACETAMINOPHEN 1000 MG: 500 TABLET ORAL at 21:09

## 2024-03-25 RX ADMIN — Medication 1 TABLET: at 08:09

## 2024-03-25 RX ADMIN — DICLOFENAC SODIUM 4 G: 10 GEL TOPICAL at 08:10

## 2024-03-25 RX ADMIN — METOPROLOL TARTRATE 25 MG: 25 TABLET, FILM COATED ORAL at 08:09

## 2024-03-25 RX ADMIN — DOCUSATE SODIUM 100 MG: 100 CAPSULE, LIQUID FILLED ORAL at 21:09

## 2024-03-25 RX ADMIN — Medication 400 MG: at 08:09

## 2024-03-25 RX ADMIN — APIXABAN 5 MG: 5 TABLET, FILM COATED ORAL at 08:10

## 2024-03-25 RX ADMIN — METOPROLOL TARTRATE 25 MG: 25 TABLET, FILM COATED ORAL at 21:10

## 2024-03-25 RX ADMIN — APIXABAN 5 MG: 5 TABLET, FILM COATED ORAL at 21:10

## 2024-03-25 RX ADMIN — Medication 3 MG: at 21:10

## 2024-03-25 RX ADMIN — AMIODARONE HYDROCHLORIDE 200 MG: 200 TABLET ORAL at 21:10

## 2024-03-25 RX ADMIN — AMIODARONE HYDROCHLORIDE 200 MG: 200 TABLET ORAL at 08:09

## 2024-03-25 ASSESSMENT — PAIN SCALES - GENERAL
PAINLEVEL_OUTOF10: 2
PAINLEVEL_OUTOF10: 0
PAINLEVEL_OUTOF10: 1
PAINLEVEL_OUTOF10: 2
PAINLEVEL_OUTOF10: 0

## 2024-03-25 ASSESSMENT — PAIN DESCRIPTION - LOCATION
LOCATION: KNEE

## 2024-03-25 ASSESSMENT — PAIN - FUNCTIONAL ASSESSMENT: PAIN_FUNCTIONAL_ASSESSMENT: PREVENTS OR INTERFERES SOME ACTIVE ACTIVITIES AND ADLS

## 2024-03-25 ASSESSMENT — PAIN DESCRIPTION - FREQUENCY: FREQUENCY: INTERMITTENT

## 2024-03-25 ASSESSMENT — PAIN SCALES - WONG BAKER: WONGBAKER_NUMERICALRESPONSE: HURTS A LITTLE BIT

## 2024-03-25 ASSESSMENT — PAIN DESCRIPTION - ORIENTATION
ORIENTATION: RIGHT
ORIENTATION: LEFT
ORIENTATION: LEFT

## 2024-03-25 ASSESSMENT — PAIN DESCRIPTION - PAIN TYPE: TYPE: SURGICAL PAIN

## 2024-03-25 ASSESSMENT — PAIN DESCRIPTION - DESCRIPTORS
DESCRIPTORS: ACHING

## 2024-03-25 NOTE — PLAN OF CARE
(3)   8. How much time will you have to provide personal care for the patient when he/she goes home? I will have some time to provide personal care for the patient. (3)    9. Do you have any health problems (for example difficulty bending or stooping, back or joint problems, heart issues, memory, depression, anxiety or other health challenges)? I have a few health problems. (3)    10. Do you think your physical or mental health problems will affect your ability to provide care for the patient? I do not think my physical or mental health problems will affect my ability to provide care. (4)   11. Do you have family and/or friends who are capable of providing help with the patient's personal care (such as bathing, using the toilet, dressing and getting in and out of bed)? I have some family and/or friends who are capable of providing help with the patient's personal care. (3)   12. Do you think these family and/or friends will be available to help with the patient's personal care when needed? I think these family and/or friends will sometimes be available to help when needed. (3)    13. Do you have other roles and responsibilities other than providing care for the patient (for example: work, volunteer work, childcare, pet care, meal preparation, laundry, home maintenance and yard work)? I have some other roles and responsibilities other than providing care for the patient. (2)   14. How will your roles and responsibilities impact your availability to provide care for the patient? My other roles and responsibilities will have some impact on my availability to provide care for the patient. (2)   15. Do you have other people (for example: co-workers, your [employer], a club or social group) who will be able to help you with your other responsibilities (for example: work, volunteer work, childcare, pet care, meal preparation, laundry, home maintenance and yard work)? I have a few other people who will be able to help with my  other responsibilities. (2)    16. How much experience do you have helping someone else with daily activities like shopping, errands, taking to appointments, medications, banking, etc.? I have at least one month but less than a year experience helping someone else with daily activities. (3)    17. How willing are you to help the patient with daily activities such as shopping, errands, taking to appointments, medications, banking, etc.? I am willing to help the patient with some daily activities such as shopping, errands, taking to appointments, medications, etc. (3)   18. How concerned are you about your ability to continue providing care for the patient next year? I am a little concerned about my ability to continue providing care for the patient for the next year. (3)   19. Is there enough money available to pay for things not paid by insurance, Social Security income, Workers compensation, In Home Support Services, or other benefits (for example medications, someone to help with personal care, medical equipment, shower chair, co-pays)? We have enough money to pay for things not covered by insurance or other benefits. (4)   20. Will there be any accessibility problems for the patient getting around in the house or using the toilet or shower (for example, the width of doorways, stairs, ramp access) in the home where he/she will be living? There will be a few accessibility problems for the patient in the home. (3)   21. Will you need to make any changes to the home (e.g. ramp, widen doors) to make it accessible? A few changes need to be made to the home to make it accessible. (3)    22. Is there enough money available to pay for the necessary changes to the home to make it accessible? We have enough money to pay for the necessary changes to the home to make it accessible. (4)   23. Will the patient have accessible transportation (e.g. care that he/she can get in and out of, someone to drive, [employer], etc.) that

## 2024-03-25 NOTE — PROGRESS NOTES
Physical Medicine & Rehabilitation   Progress Note    Chief Complaint:  Debility 2/2 TKA and new onset a-fib       Subjective:  Patient seen and examined. JONNAO.  Patient reports that she had a good weekend.  She reports some spasms in her left lower extremity yesterday evening.  These have resolved and she was able to sleep overnight.  She denies any chest pain or shortness of breath.  She denies any significant changes to bowel or bladder.  We discussed patient's scheduled for Ortho follow-up for 3/28.  I will reach out to Ortho today regarding this.  Patient continues to tolerate and progress well with therapies.      Rehabilitation:   PT 03/23/2024:  Bed Mobility:  Supine to Sit: Stand By Assistance, X 1, with head of bed flat  Sit to Supine: Stand By Assistance, X 1, with head of bed flat, without rail      Transfers:  Sit to Stand:Stand By Assistance, X 1  Stand to Sit:Stand By Assistance, X 1     Ambulation:  Stand By Assistance, Contact Guard Assistance, X 1  Distance: 100'x1, 10'x2, and short functional distances   Surface: Level Tile  Device: Rolling Walker  Gait Deviations:  Forward Flexed Posture, Slow Cammie, Decreased Step Length Bilaterally, Decreased Weight Shift Bilaterally, Decreased Gait Speed, Decreased Heel Strike on Left, Decreased Foot Clearance Left, and Increased reliance on walker  *Pt demo increased carryover from AM session to PM for improved step height and foot clearance of L LE with longer distances however demo decreased carryover with short gait often demos decreased step length and eversion of L foot     Balance:  *Pt completed reciprocal and nonreciprocal hurdles in parallel bars with B UE support for increased gait mechanics with increased step height and wt shift to L LE. Pt required therapist to place foot on L side of hurdles due to patient demo circumduction of L LE due to decreased L knee ROM   *pt completed cone weaving step overs and retrieval with long handled reacher  ankle dorsiflexion; moving bilateral upper extremities against gravity  Tone:  normal  Muscle bulk: within normal limits  No calf tenderness  Gait: Not assessed     Heart: Well-perfused extremities, RRR no M/R/G noted  Lungs: Breathing comfortably on room air without any increased work of breathing, CTAB  Abdomen:  non-distended  Skin: warm and dry, no rash or erythema on exposed skin  Edema left lower extremity      Diagnostics:   Recent Results (from the past 24 hour(s))   CBC with Auto Differential    Collection Time: 03/25/24  6:04 AM   Result Value Ref Range    WBC 9.1 4.8 - 10.8 thou/mm3    RBC 3.56 (L) 4.20 - 5.40 mill/mm3    Hemoglobin 10.9 (L) 12.0 - 16.0 gm/dl    Hematocrit 33.8 (L) 37.0 - 47.0 %    MCV 94.9 81.0 - 99.0 fL    MCH 30.6 26.0 - 33.0 pg    MCHC 32.2 32.2 - 35.5 gm/dl    RDW-CV 12.1 11.5 - 14.5 %    RDW-SD 42.8 35.0 - 45.0 fL    Platelets 423 (H) 130 - 400 thou/mm3    MPV 8.2 (L) 9.4 - 12.4 fL    Seg Neutrophils 71.6 %    Lymphocytes 16.2 %    Monocytes 8.4 %    Eosinophils 2.9 %    Basophils 0.6 %    Immature Granulocytes 0.3 %    Segs Absolute 6.5 1.8 - 7.7 thou/mm3    Lymphocytes Absolute 1.5 1.0 - 4.8 thou/mm3    Monocytes Absolute 0.8 0.4 - 1.3 thou/mm3    Eosinophils Absolute 0.3 0.0 - 0.4 thou/mm3    Basophils Absolute 0.1 0.0 - 0.1 thou/mm3    Immature Grans (Abs) 0.03 0.00 - 0.07 thou/mm3    nRBC 0 /100 wbc   Basic Metabolic Panel    Collection Time: 03/25/24  6:04 AM   Result Value Ref Range    Sodium 133 (L) 135 - 145 meq/L    Potassium 4.4 3.5 - 5.2 meq/L    Chloride 95 (L) 98 - 111 meq/L    CO2 27 23 - 33 meq/L    Glucose 94 70 - 108 mg/dL    BUN 17 7 - 22 mg/dL    Creatinine 0.6 0.4 - 1.2 mg/dL    Calcium 8.9 8.5 - 10.5 mg/dL   Anion Gap    Collection Time: 03/25/24  6:04 AM   Result Value Ref Range    Anion Gap 11.0 8.0 - 16.0 meq/L   Glomerular Filtration Rate, Estimated    Collection Time: 03/25/24  6:04 AM   Result Value Ref Range    Est, Glom Filt Rate >60 >60 ml/min/1.73m2

## 2024-03-25 NOTE — PROGRESS NOTES
[facility]  INPATIENT PHYSICAL THERAPY  [facility] - 8K-15/015-A  Daily Note    Time In: 0900  Time Out: 1030  Timed Code Treatment Minutes: 90 Minutes  Minutes: 90          Date: 3/25/2024  Patient Name: Kirstin Estrada,  Gender:  female        MRN: [id number]  : 1945  (78 y.o.)     Referring Practitioner: Beth Crespo DO  Diagnosis: History of Total knee arthroplasty, left  Additional Pertinent Hx: Kirstin Estrada is a 78 y.o. female with PMH significant for hypertension, hyperlipidemia, and hyponatremia who was admitted to [facility] on 3/11/2024.  Patient initially presented to [facility] ED on 3/11/2024 from [facility] for evaluation of new onset A-fib with RVR and hypotension during procedure.  Per report, patient underwent a left total knee replacement by Dr. Salazar at [facility] on 3/11/2024.  Patient reportedly tolerated the procedure well, however, after the patient procedure she was noted to have new onset of A-fib with RVR along with hypotension.  Patient was given beta-blocker and IV fluids and transferred to [facility] for further evaluation management.  On arrival to ED, patient's heart rate noted to be in the 130s.   On admission Ortho and cardiology were consulted.  Per Ortho, patient is WBAT to left lower extremity.   Cardiology evaluated patient.  Patient noted to have an elevated BNP which was thought to likely be related to A-fib.  Recommending a 30-day event monitor to determine burden and need for chronic OAC.     During acute care stay, patient was in and out of A-fib with RVR.  On multiple occasions, plan was to pursue cardioversion, however, patient will then return to normal sinus rhythm.  Patient was started on Eliquis 5 mg twice daily.  Additionally, beta-blocker was increased.  Patient was transitioned from IV amiodarone to amiodarone 200 mg daily.  Patient will need close outpatient follow-up with cardiology.  Pt with rapid response on evening of  with use of RW and Supervision in order to assist with safety with transfers in home.  Short Term Goal 3: Patient to ambulate >/=30 feet with use of RW and SBA in order to assist with home mobility.  Short Term Goal 4: Patient to ascend/descend 1 steps with B handrails and CGA in order to assist with home entry.  Short Term Goal 5: Patient to demonstrated increased L knee flexion ROM to >/=90 degrees in order to assist with increased ease with transfers and mobility.  Long Term Goals  Time Frame for Long Term Goals : 2 weeks from IPR evaluation  Long Term Goal 1: Patient to perform supine<>sit with Mod I in order to assist with getting into and out of bed.  Long Term Goal 2: Patient to perform sit<>stand transfers with use of RW and Mod I in order to assist with safety with transfers in home.  Long Term Goal 3: Patient to ambulate >/=100 feet with use of RW and Mod I in order to assist with home and community mobility.  Long Term Goal 4: Patient to perform car transfer with Supervision in order to assist with getting to and from appointments.  Long Term Goal 5: Patient to score >/=19/28 on the Tinetti in order to reduce risk for falls.  Additional Goals?: Yes  Long term goal 6: Patient to demonstrated increased L knee flexion ROM to >/=100 degrees in order to assist with functional mobility.    Following session, patient left in safe position with all fall risk precautions in place.

## 2024-03-25 NOTE — PLAN OF CARE
Problem: Discharge Planning  Goal: Discharge to home or other facility with appropriate resources  3/25/2024 1404 by Coty Iqbal LISW  Note: SW received a call from patient's son, Nura, on this date regarding discharge planning. Nura wanted an update on patient's progress and discharge planning. SW updated Nura on patient's progress and plans for discharge on Friday, 3/29. SW educated Nura on patient's recommendations for outpatient PT at discharge. Nura reported understanding. SW and Nura discussed family training, but is unable to schedule due to Nura's work schedule. HEP's from PT and OT will be available if needed.     SW asked to meet with patient on this date regarding discharge planning. Patient previously reported wanting to use [facility] in [employer], but she is concerned regarding transportation. SW educated patient on her options including home health services until she can drive again. Post-acute (PAC) provider list was provided to patient. Patient was informed of their freedom to choose PAC provider. Discussed and offered to show the patient the relevant PAC Providers quality and resource use measures on Medicare Compare web site via computer based on patient's goals of care and treatment preferences. Questions regarding selection process were answered. Patient to discuss options with her son to determine best plan. Patient asked for OT to talk with her son regarding modifications in the home.    SW will follow and maintain involvement in discharge planning.

## 2024-03-25 NOTE — PLAN OF CARE
Problem: Discharge Planning  Goal: Discharge to home or other facility with appropriate resources  3/25/2024 1609 by Sole Martin RN  Outcome: Progressing  Flowsheets (Taken 3/25/2024 1609)  Discharge to home or other facility with appropriate resources:   Identify barriers to discharge with patient and caregiver   Identify discharge learning needs (meds, wound care, etc)     Problem: Safety - Adult  Goal: Free from fall injury  Outcome: Progressing  Flowsheets (Taken 3/25/2024 1609)  Free From Fall Injury: Instruct family/caregiver on patient safety     Problem: Pain  Goal: Verbalizes/displays adequate comfort level or baseline comfort level  Outcome: Progressing  Flowsheets (Taken 3/25/2024 1609)  Verbalizes/displays adequate comfort level or baseline comfort level:   Assess pain using appropriate pain scale   Administer analgesics based on type and severity of pain and evaluate response

## 2024-03-25 NOTE — PROGRESS NOTES
[facility]  Occupational Therapy  Daily Note  Time:   Time In: 1100  Time Out: 1230  Timed Code Treatment Minutes: 90 Minutes  Minutes: 90          Date: 3/25/2024  Patient Name: Kirstin Estrada,   Gender: female      Room: [facility]15/015-A  MRN: [id number]  : 1945  (78 y.o.)  Referring Practitioner: Dr KATIE Crespo  Diagnosis: History of total knee arthroplasty  Additional Pertinent Hx: Kirstin Estrada is a 78 y.o. female with PMH significant for hypertension, hyperlipidemia, and hyponatremia who was admitted to [facility] on 3/11/2024.  Patient initially presented to [facility] ED on 3/11/2024 from [facility] for evaluation of new onset A-fib with RVR and hypotension during procedure.  Per report, patient underwent a left total knee replacement by Dr. Salazar at [facility] on 3/11/2024.  Patient reportedly tolerated the procedure well, however, after the patient procedure she was noted to have new onset of A-fib with RVR along with hypotension.  Patient was given beta-blocker and IV fluids and transferred to [facility] for further evaluation management.  On arrival to ED, patient's heart rate noted to be in the 130s.   On admission Ortho and cardiology were consulted.  Per Ortho, patient is WBAT to left lower extremity.   Cardiology evaluated patient.  Patient noted to have an elevated BNP which was thought to likely be related to A-fib.  Recommending a 30-day event monitor to determine burden and need for chronic OAC.     During acute care stay, patient was in and out of A-fib with RVR.  On multiple occasions, plan was to pursue cardioversion, however, patient will then return to normal sinus rhythm.  Patient was started on Eliquis 5 mg twice daily.  Additionally, beta-blocker was increased.  Patient was transitioned from IV amiodarone to amiodarone 200 mg daily.  Patient will need close outpatient follow-up with cardiology.  Pt with rapid response on evening of 3/19 and  Precautions, Reviewed Prior Education, Home Safety, Importance of Increasing Activity, and Assistive Device Safety    Goals  Short Term Goals  Time Frame for Short Term Goals: 1 week  Short Term Goal 1: Pt will complete LB dressing and footwear mgt using LHAE prn to improve indep with donning her shoes.  Short Term Goal 2: Pt will tolerate x8 min standing with 1-2 UE release at SBA to improve dynamic balance and activity toelrance requried fro sinkside I/ADLs.  Short Term Goal 3: Pt will complete shower transfers in simulated home set up with SBA to improve indep with accessing her bathroom at home.  Short Term Goal 4: Pt will retrieve x5 items from various surface heights at SBA to improve safety, balance, and toelrance required for IADLs and ADL prep/clean up.  Long Term Goals  Time Frame for Long Term Goals : 2 weeks from OT evaluation  Long Term Goal 1: Pt will compelte BADL routine with modified independence using AE prn to improve indep with self care at home alone.  Long Term Goal 2: Pt will complete light cooking tasks with modified independence with use of RW to improve indep preparing lunch for self at home.  Long Term Goal 3: Pt will use RW to complete light IADL tasks with mod I and no vcs for modifications and AE safety to improve indep at home alone.  Long Term Goal 4: Pt will complete BUE strengthening HEP while following handout with mod I to improve UB strength and endurance for home mgt tasks.    Following session, patient left in safe position with all fall risk precautions in place.

## 2024-03-26 PROCEDURE — 6370000000 HC RX 637 (ALT 250 FOR IP): Performed by: REGISTERED NURSE

## 2024-03-26 PROCEDURE — 97110 THERAPEUTIC EXERCISES: CPT

## 2024-03-26 PROCEDURE — 97116 GAIT TRAINING THERAPY: CPT

## 2024-03-26 PROCEDURE — 99232 SBSQ HOSP IP/OBS MODERATE 35: CPT | Performed by: STUDENT IN AN ORGANIZED HEALTH CARE EDUCATION/TRAINING PROGRAM

## 2024-03-26 PROCEDURE — 6370000000 HC RX 637 (ALT 250 FOR IP): Performed by: STUDENT IN AN ORGANIZED HEALTH CARE EDUCATION/TRAINING PROGRAM

## 2024-03-26 PROCEDURE — 97530 THERAPEUTIC ACTIVITIES: CPT

## 2024-03-26 PROCEDURE — 97535 SELF CARE MNGMENT TRAINING: CPT

## 2024-03-26 PROCEDURE — 1180000000 HC REHAB R&B

## 2024-03-26 PROCEDURE — 97112 NEUROMUSCULAR REEDUCATION: CPT

## 2024-03-26 RX ADMIN — APIXABAN 5 MG: 5 TABLET, FILM COATED ORAL at 19:23

## 2024-03-26 RX ADMIN — ACETAMINOPHEN 1000 MG: 500 TABLET ORAL at 17:48

## 2024-03-26 RX ADMIN — METOPROLOL TARTRATE 25 MG: 25 TABLET, FILM COATED ORAL at 09:59

## 2024-03-26 RX ADMIN — Medication 400 MG: at 09:59

## 2024-03-26 RX ADMIN — Medication 1 TABLET: at 09:59

## 2024-03-26 RX ADMIN — APIXABAN 5 MG: 5 TABLET, FILM COATED ORAL at 09:59

## 2024-03-26 RX ADMIN — METOPROLOL TARTRATE 25 MG: 25 TABLET, FILM COATED ORAL at 19:23

## 2024-03-26 RX ADMIN — AMIODARONE HYDROCHLORIDE 200 MG: 200 TABLET ORAL at 10:00

## 2024-03-26 RX ADMIN — DICLOFENAC SODIUM 4 G: 10 GEL TOPICAL at 19:23

## 2024-03-26 RX ADMIN — AMIODARONE HYDROCHLORIDE 200 MG: 200 TABLET ORAL at 19:22

## 2024-03-26 RX ADMIN — DICLOFENAC SODIUM 4 G: 10 GEL TOPICAL at 11:20

## 2024-03-26 ASSESSMENT — PAIN SCALES - GENERAL
PAINLEVEL_OUTOF10: 0
PAINLEVEL_OUTOF10: 3
PAINLEVEL_OUTOF10: 1
PAINLEVEL_OUTOF10: 0

## 2024-03-26 ASSESSMENT — PAIN DESCRIPTION - ORIENTATION: ORIENTATION: LEFT

## 2024-03-26 ASSESSMENT — PAIN - FUNCTIONAL ASSESSMENT: PAIN_FUNCTIONAL_ASSESSMENT: ACTIVITIES ARE NOT PREVENTED

## 2024-03-26 ASSESSMENT — PAIN DESCRIPTION - DESCRIPTORS: DESCRIPTORS: ACHING

## 2024-03-26 ASSESSMENT — PAIN DESCRIPTION - LOCATION: LOCATION: KNEE

## 2024-03-26 NOTE — PROGRESS NOTES
Patient: Kirstin Estrada  Unit/Bed: 8K-15/015-A  YOB: 1945  MRN: [id number] Acct: [id number]   Admitting Diagnosis: History of total knee arthroplasty, left [Z96.652]  Admit Date:  3/19/2024  Hospital Day: 6    Assessment:     Principal Problem:    History of total knee arthroplasty, left  Resolved Problems:    * No resolved hospital problems. *      Plan:     Check a doppler of the left leg as it is more edematous and warm than the right.        Subjective:     Patient has no complaint of CP or SOB..   Medication side effects: none    Scheduled Meds:   [START ON 3/27/2024] amiodarone  200 mg Oral Daily    docusate sodium  100 mg Oral BID    diclofenac sodium  4 g Topical BID    amiodarone  200 mg Oral BID    metoprolol tartrate  25 mg Oral BID    multivitamin  1 tablet Oral Daily    apixaban  5 mg Oral BID    magnesium oxide  400 mg Oral Daily     Continuous Infusions:   sodium chloride       PRN Meds:sodium chloride flush, sodium chloride, polyethylene glycol, potassium chloride **OR** potassium alternative oral replacement **OR** potassium chloride, magnesium sulfate, melatonin, traMADol **OR** traMADol, acetaminophen, ondansetron    Review of Systems  Pertinent items are noted in HPI.    Objective:     Patient Vitals for the past 8 hrs:   BP Temp Temp src Pulse Resp SpO2   03/25/24 2105 131/69 98.1 °F (36.7 °C) Oral 100 20 100 %     I/O last 3 completed shifts:  In: 2700 [P.O.:2700]  Out: -   No intake/output data recorded.    /69   Pulse 100   Temp 98.1 °F (36.7 °C) (Oral)   Resp 20   Ht 1.6 m (5' 3\")   Wt 63.3 kg (139 lb 8.8 oz)   SpO2 100%   BMI 24.72 kg/m²     General appearance: alert, appears stated age, and cooperative  Head: Normocephalic, without obvious abnormality, atraumatic  Lungs: clear to auscultation bilaterally  Heart: regular rate and rhythm, S1, S2 normal, no murmur, click, rub or gallop  Abdomen: soft, non-tender; bowel sounds normal; no masses,  no

## 2024-03-26 NOTE — PROGRESS NOTES
Patient sitting up in chair for much of shift. Incision healing well with sutures - no drainage noted. Had one episode of diarrhea today so wanted to stop Mg and Sajan, which have loose stools as side effect. Dr. Crespo aware. Up with walker. Event monitor on.

## 2024-03-26 NOTE — PLAN OF CARE
Problem: Discharge Planning  Goal: Discharge to home or other facility with appropriate resources  3/26/2024 1725 by Patricia Aguilar, RN  Outcome: Progressing  Flowsheets (Taken 3/26/2024 1725)  Discharge to home or other facility with appropriate resources: Identify barriers to discharge with patient and caregiver  Note: Plan for discharge on 3/29/24.      Problem: Safety - Adult  Goal: Free from fall injury  Outcome: Progressing  Flowsheets (Taken 3/26/2024 1725)  Free From Fall Injury: Instruct family/caregiver on patient safety  Note: No falls this shift.      Problem: ABCDS Injury Assessment  Goal: Absence of physical injury  Outcome: Progressing  Flowsheets (Taken 3/26/2024 0001 by Ashish Ni, RN)  Absence of Physical Injury: Implement safety measures based on patient assessment  Note: Patient aware of safety precautions.

## 2024-03-26 NOTE — PLAN OF CARE
Nursing notified me that patient is requesting that magnesium be placed on hold due to loose stools.  Looking back in chart, it appears that she was started on magnesium supplementation on 3/14/2024.  I assume that this was started in order to optimize electrolytes given her recent history of repeated episodes of A-fib with RVR.  She has reportedly had a couple episodes of loose stools over the past couple of days.  I did temporarily place magnesium on hold, however, I ordered a magnesium level for tomorrow, 3/27.  We can follow-up and discuss further with patient based on those results.      Cardiology recommendations in acute care are to keep K>4 and Mag >2.  Recommend following up on labs in the morning and restarting after discussion with patient if mag <2    Electronically signed by Beth Crespo DO on 3/26/2024 at 4:53 PM

## 2024-03-26 NOTE — PROGRESS NOTES
[facility]  INPATIENT PHYSICAL THERAPY  DAILY NOTE  [facility] - 8K-15/015-A    Time In: 0945  Time Out: 1115  Timed Code Treatment Minutes: 90 Minutes  Minutes: 90          Date: 3/26/2024  Patient Name: Kirstin Estrada,  Gender:  female        MRN: [id number]  : 1945  (78 y.o.)     Referring Practitioner: Beth Crespo DO  Diagnosis: History of Total knee arthroplasty, left  Additional Pertinent Hx: Kirstin Estrada is a 78 y.o. female with PMH significant for hypertension, hyperlipidemia, and hyponatremia who was admitted to [facility] on 3/11/2024.  Patient initially presented to [facility] ED on 3/11/2024 from [facility] for evaluation of new onset A-fib with RVR and hypotension during procedure.  Per report, patient underwent a left total knee replacement by Dr. Salazar at [facility] on 3/11/2024.  Patient reportedly tolerated the procedure well, however, after the patient procedure she was noted to have new onset of A-fib with RVR along with hypotension.  Patient was given beta-blocker and IV fluids and transferred to [facility] for further evaluation management.  On arrival to ED, patient's heart rate noted to be in the 130s.   On admission Ortho and cardiology were consulted.  Per Ortho, patient is WBAT to left lower extremity.   Cardiology evaluated patient.  Patient noted to have an elevated BNP which was thought to likely be related to A-fib.  Recommending a 30-day event monitor to determine burden and need for chronic OAC.     During acute care stay, patient was in and out of A-fib with RVR.  On multiple occasions, plan was to pursue cardioversion, however, patient will then return to normal sinus rhythm.  Patient was started on Eliquis 5 mg twice daily.  Additionally, beta-blocker was increased.  Patient was transitioned from IV amiodarone to amiodarone 200 mg daily.  Patient will need close outpatient follow-up with cardiology.  Pt with rapid response on evening of  Walker    Balance:  Static Sitting Balance:  Modified Independent  Dynamic Sitting Balance: Modified Independent  Static Standing Balance: Supervision  Dynamic Standing Balance: Contact Guard Assistance, on AirEx  - Patient completing dynamic standing reaching task with patient completing reaching for rings and tossing towards target. Patient unsteady throughout activity however no LOB noted.    Exercise:  Patient was guided in 1 set(s) 10-15 reps of exercise to both lower extremities.  Ankle pumps, Glut sets, Quad sets, Heelslides, Seated marches, Seated hamstring curls, Seated heel/toe raises, Long arc quads, Seated isometric hip adduction, and Seated abduction/adduction.  Exercises were completed for increased independence with functional mobility.  -NuStep workload 1 x 10 min, moving seat fwd to promote L knee flexion. Patient able to achieve 83 degrees of L knee flexion.  -Patient completing Quad set with ankles on bolster, L knee extension lagging 5 degrees  - L knee flexion on mat table with overpressure measured at 93 degrees.    Functional Outcome Measures: Completed     Modified Isai Scale:  Not Applicable    ASSESSMENT:  Assessment: Patient progressing toward established goals.  Activity Tolerance:  Patient tolerance of  treatment: good.      Equipment Recommendations:Equipment Needed: Yes (Continue to assess pending progress (Patient has borrowed standard walker will likely need RW))  Discharge Recommendations: Continue to assess pending progress and Patient would benefit from continued PT at discharge  Plan: Current Treatment Recommendations: Strengthening, Home exercise program, Functional mobility training, Stair training, Gait training, Therapeutic activities, Safety education & training, Transfer training, Patient/Caregiver education & training, Equipment evaluation, education, & procurement, Balance training, Endurance training, Neuromuscular re-education  General Plan:  (5x/wk 90

## 2024-03-26 NOTE — PLAN OF CARE
Problem: Discharge Planning  Goal: Discharge to home or other facility with appropriate resources  3/26/2024 0001 by Ashish Ni RN  Outcome: Progressing  Flowsheets (Taken 3/25/2024 2105)  Discharge to home or other facility with appropriate resources: Identify barriers to discharge with patient and caregiver  3/25/2024 1609 by Sole Martin RN  Outcome: Progressing  Flowsheets (Taken 3/25/2024 1609)  Discharge to home or other facility with appropriate resources:   Identify barriers to discharge with patient and caregiver   Identify discharge learning needs (meds, wound care, etc)     Problem: Safety - Adult  Goal: Free from fall injury  3/26/2024 0001 by Ashish Ni RN  Outcome: Progressing  Flowsheets (Taken 3/26/2024 0001)  Free From Fall Injury: Instruct family/caregiver on patient safety  3/25/2024 1609 by Sole Martin RN  Outcome: Progressing  Flowsheets (Taken 3/25/2024 1609)  Free From Fall Injury: Instruct family/caregiver on patient safety     Problem: ABCDS Injury Assessment  Goal: Absence of physical injury  Outcome: Progressing  Flowsheets (Taken 3/26/2024 0001)  Absence of Physical Injury: Implement safety measures based on patient assessment     Problem: Pain  Goal: Verbalizes/displays adequate comfort level or baseline comfort level  3/26/2024 0001 by Ashish Ni RN  Outcome: Progressing  Flowsheets (Taken 3/26/2024 0001)  Verbalizes/displays adequate comfort level or baseline comfort level:   Encourage patient to monitor pain and request assistance   Assess pain using appropriate pain scale  3/25/2024 1609 by Sole Martin RN  Outcome: Progressing  Flowsheets (Taken 3/25/2024 1609)  Verbalizes/displays adequate comfort level or baseline comfort level:   Assess pain using appropriate pain scale   Administer analgesics based on type and severity of pain and evaluate response     Problem: Skin/Tissue Integrity  Goal: Absence of new skin breakdown  Description: 1.  Monitor for  areas of redness and/or skin breakdown  2.  Assess vascular access sites hourly  3.  Every 4-6 hours minimum:  Change oxygen saturation probe site  4.  Every 4-6 hours:  If on nasal continuous positive airway pressure, respiratory therapy assess nares and determine need for appliance change or resting period.  Outcome: Progressing     Problem: Cardiovascular - Adult  Goal: Maintains optimal cardiac output and hemodynamic stability  Outcome: Progressing  Flowsheets (Taken 3/25/2024 2105)  Maintains optimal cardiac output and hemodynamic stability: Monitor blood pressure and heart rate     Problem: Musculoskeletal - Adult  Goal: Return mobility to safest level of function  Outcome: Progressing  Flowsheets (Taken 3/25/2024 2105)  Return Mobility to Safest Level of Function:   Assess patient stability and activity tolerance for standing, transferring and ambulating with or without assistive devices   Assist with transfers and ambulation using safe patient handling equipment as needed     Problem: Infection - Adult  Goal: Absence of infection during hospitalization  Outcome: Progressing  Flowsheets (Taken 3/25/2024 2105)  Absence of infection during hospitalization:   Assess and monitor for signs and symptoms of infection   Monitor lab/diagnostic results     Problem: Metabolic/Fluid and Electrolytes - Adult  Goal: Electrolytes maintained within normal limits  Outcome: Progressing  Flowsheets (Taken 3/25/2024 2105)  Electrolytes maintained within normal limits:   Monitor labs and assess patient for signs and symptoms of electrolyte imbalances   Administer electrolyte replacement as ordered

## 2024-03-26 NOTE — PROGRESS NOTES
[facility]  Occupational Therapy  Daily Note  Time:   Time In: 1330  Time Out: 1500  Timed Code Treatment Minutes: 90 Minutes  Minutes: 90          Date: 3/26/2024  Patient Name: Kirstin Estrada,   Gender: female      Room: [facility]15/015-A  MRN: [id number]  : 1945  (78 y.o.)  Referring Practitioner: Dr KATIE Crespo  Diagnosis: History of total knee arthroplasty  Additional Pertinent Hx: Kirstin Estrada is a 78 y.o. female with PMH significant for hypertension, hyperlipidemia, and hyponatremia who was admitted to [facility] on 3/11/2024.  Patient initially presented to [facility] ED on 3/11/2024 from [facility] for evaluation of new onset A-fib with RVR and hypotension during procedure.  Per report, patient underwent a left total knee replacement by Dr. Salazar at [facility] on 3/11/2024.  Patient reportedly tolerated the procedure well, however, after the patient procedure she was noted to have new onset of A-fib with RVR along with hypotension.  Patient was given beta-blocker and IV fluids and transferred to [facility] for further evaluation management.  On arrival to ED, patient's heart rate noted to be in the 130s.   On admission Ortho and cardiology were consulted.  Per Ortho, patient is WBAT to left lower extremity.   Cardiology evaluated patient.  Patient noted to have an elevated BNP which was thought to likely be related to A-fib.  Recommending a 30-day event monitor to determine burden and need for chronic OAC.     During acute care stay, patient was in and out of A-fib with RVR.  On multiple occasions, plan was to pursue cardioversion, however, patient will then return to normal sinus rhythm.  Patient was started on Eliquis 5 mg twice daily.  Additionally, beta-blocker was increased.  Patient was transitioned from IV amiodarone to amiodarone 200 mg daily.  Patient will need close outpatient follow-up with cardiology.  Pt with rapid response on evening of 3/19 and  home.  Long Term Goal 3: Pt will use RW to complete light IADL tasks with mod I and no vcs for modifications and AE safety to improve indep at home alone.  Long Term Goal 4: Pt will complete BUE strengthening HEP while following handout with mod I to improve UB strength and endurance for home mgt tasks.    Following session, patient left in safe position with all fall risk precautions in place.

## 2024-03-26 NOTE — PROGRESS NOTES
Physical Medicine & Rehabilitation   Progress Note    Chief Complaint:  Debility 2/2 TKA and new onset a-fib       Subjective:  Patient seen and examined. QUINCY.  Discussed with ortho PA who will reschedule follow up to 4/2/2024 at 0900. Patient reports some ongoing swelling in LLE. Doppler ordered by Dr. Gottlieb and reportedly negative for DVT.  Patient reports that pain is overall improving.  She is required last as needed tramadol.  We discussed goal of modified independence within room prior to discharge home.  Son is working on plans for installing grab bars and is hoping to speak with occupational therapy regarding these recommendations.  No reports of chest pain or shortness of breath.  No reports of any significant changes to bowel or bladder.      Rehabilitation:   PT 03/25/2024:  Bed Mobility:  Supine to Sit: Supervision  Sit to Supine: Supervision   *Patient performed with HOB flat and no railings      Transfers:  Sit to Stand:Supervision, Stand By Assistance  Stand to Sit:Supervision, Stand By Assistance  *Patient instructed in sit<>stand transfers from various surfaces including: recliner, wheelchair and standard chair without arm rests in rehab apartment with use of RW.      Car:Stand By Assistance  *Patient able to perform with proper technique      Ambulation:  Stand By Assistance  Distance: 180 feet, 190 feet, 20 feet x 2 and various short distances in rehab gym  Surface: Level Tile, Carpet, and Ramp  Device: Rolling Walker  Gait Deviations:  Slow Cammie, Decreased Step Length on Left, Decreased Weight Shift Left, Decreased Gait Speed, Decreased Foot Clearance Left, Decreased Terminal Knee Extension, and Increased reliance on walker  *Patient required cueing for increased L knee flexion for toe clearance.      Stairs:  Platform: 4\" platform X 2 using Parallel Bars and Stand By Assistance. 6\" platform X 2 using Parallel Bars and Stand By Assistance.  *Patient required education on proper  items from floor this date with 1 UE supported on RW to bend down to gather item. Pt required Supervision with task and no LOB noted.       BALANCE:  Sitting Balance:  Independent.   Standing Balance: Supervision. Pt tolerated > 3 min standing with no LOB noted during grooming task.    TRANSFERS:  Sit to Stand:  Supervision.    Stand to Sit: Supervision.       FUNCTIONAL MOBILITY:  Assistive Device: Rolling Walker  Assist Level:  Supervision.   Distance: To and from bathroom, up to 12 ft in therapy apartment, throughout grocery store      ADDITIONAL ACTIVITIES:  Pt completed IADL grocery store task of gathering x15 items from grocery store, putting them on counter and in car to increase activity tolerance, safety awareness in order to improve Indep with IADL tasks. Pt tolerated task > 10 min before requiring x1 seated rest break after gathering all of the items on list. Pt demo'ed good safety awareness and utilized cart as AD. Pt required Supervision throughout task and min vc for placement of groceries to avoid breaking eggs/ bread.      Patient completed BUE strengthening exercises with skilled education on HEP: completed x10 reps x1 set with a 2# dowel arnie in all joints and all planes in order to improve UE strength and activity tolerance required for BADL routine and toilet / shower transfers. Patient tolerated well, requiring x2 rest breaks. Patient also required min cues for technique.       Review of Systems:  CONSTITUTIONAL:  negative for  fevers and chills; positive for decreased appetite  EYES:  negative for  visual disturbance and irritation  HEENT:  negative for  hearing loss and voice change  RESPIRATORY:  negative for  dyspnea and wheezing  CARDIOVASCULAR:  negative for  chest pain or palpitations  GASTROINTESTINAL:  negative for nausea, vomiting, and diarrhea  GENITOURINARY:  negative for frequency and dysuria  SKIN:  negative for rash  MUSCULOSKELETAL:  positive for  arthralgias, pain, joint

## 2024-03-26 NOTE — CARE COORDINATION
Kirstin Estrada was evaluated today and a DME order was entered for a wheeled walker because she requires this to successfully complete daily living tasks of eating, bathing, toileting, personal cares, ambulating, grooming, hygiene, dressing upper body, dressing lower body, and meal preparation.  A wheeled walker is necessary due to the patient's unsteady gait, upper body weakness, and inability to  an ambulation device; and she can ambulate only by pushing a walker instead of a lesser assistive device such as a cane, crutch, or standard walker.  The need for this equipment was discussed with the patient and she understands and is in agreement.     Rony Granado PT, DPT

## 2024-03-26 NOTE — PROGRESS NOTES
[facility]  Recreational Therapy  Daily Note  [facility]    Time Spent with Patient: 0 minutes    Date:  3/26/2024       Patient Name: Kirstin Estrada      MRN: [id number]      YOB: 1945 (78 y.o.)       Gender: female  Diagnosis: History of total knee arthroplasty  Referring Practitioner: Dr KATIE Crespo    RESTRICTIONS/PRECAUTIONS:  Restrictions/Precautions: Weight Bearing, Fall Risk     Hearing: Within functional limits    PAIN: 0    SUBJECTIVE:  my appetite isn't good at all    OBJECTIVE:  Pt enjoyed listening to the  briefly while working with PT in the gym-pt is not interested in any leisure materials for in room use due to getting so many messages on her phone that she has to reply to-pt not interested in eating lunch with peers due to her appetite not very good yet-pt pleasant and content in room          Patient Education  New Education Provided: Importance of Leisure,     Electronically signed by: Elis Gonzalez, CTRS  Date: 3/26/2024

## 2024-03-26 NOTE — PROGRESS NOTES
Comprehensive Nutrition Assessment    Type and Reason for Visit:  Reassess (po/supplement)    Nutrition Recommendations/Plan:   Continue MVI.  Stopped Sajan, patient concerned may be contributing to loose stools.  ? If Mag-oxide is in part contributing. She is also having RN hold colace for now.  Will provide yogurt TID for probiotic and extra protein benefit.  Continue current diet.  Encouraged her to have family/friends bring in foods as desired to assist in improving oral intake.       Malnutrition Assessment:  Malnutrition Status:  No malnutrition (03/20/24 1553)    Context:  Acute Illness     Findings of the 6 clinical characteristics of malnutrition:  Energy Intake:  Mild decrease in energy intake (Comment)since admit  Weight Loss:  No significant weight loss     Body Fat Loss:  No significant body fat loss     Muscle Mass Loss:  No significant muscle mass loss    Fluid Accumulation:  No significant fluid accumulation     Strength:  Not Performed    Nutrition Assessment:     Pt. remains nutritionally compromised AEB variable oral intake d/t reports of food not sounding or tasting good since surgery.  At risk for further nutrition compromise r/t increased nutrient needs to support post-op incisional wound healing, s/p 3/11/24 left TKA, new onset afib,  and underlying medical condition (hx: HTN, chronic hyponatremia).       Nutrition Related Findings:    Pt. Report/Treatments/Miscellaneous: Patient seen, reports nothing sounds or tastes good since surgery. Reports her family has offered to even bring in foods and she has declined due to not even sounding good.  Tried Sajan but started having loose stools in the last 24 hours and she was concerned this might be cause.  Discussed she is on mag-ox which she wasn't prior to admit.  She had RN hold her colace until stools slow down.   GI Status:  5 stools in the last 24 hours  Pertinent Labs: 3/25/24: Sodium 133  Pertinent Meds: amiodarone, eliquis, colace,  Outcomes: Biochemical Data, GI Status, Fluid Status or Edema, Nutrition Focused Physical Findings, Skin, Weight    Discharge Planning:    Continue current diet     Allison C Schwab, RD, LD  Contact: [phone]

## 2024-03-26 NOTE — PROGRESS NOTES
Patient: Kirstin Estrada  Unit/Bed: 8K-15/015-A  YOB: 1945  MRN: [id number] Acct: [id number]   Admitting Diagnosis: History of total knee arthroplasty, left [Z96.652]  Admit Date:  3/19/2024  Hospital Day: 7    Assessment:     Principal Problem:    History of total knee arthroplasty, left  Resolved Problems:    * No resolved hospital problems. *      Plan:     Continue to follow        Subjective:     Patient has no complaint of CP or SOB.   Medication side effects: none    Scheduled Meds:   [START ON 3/27/2024] amiodarone  200 mg Oral Daily    docusate sodium  100 mg Oral BID    diclofenac sodium  4 g Topical BID    amiodarone  200 mg Oral BID    metoprolol tartrate  25 mg Oral BID    multivitamin  1 tablet Oral Daily    apixaban  5 mg Oral BID    [Held by provider] magnesium oxide  400 mg Oral Daily     Continuous Infusions:   sodium chloride       PRN Meds:sodium chloride flush, sodium chloride, polyethylene glycol, potassium chloride **OR** potassium alternative oral replacement **OR** potassium chloride, magnesium sulfate, melatonin, traMADol **OR** traMADol, acetaminophen, ondansetron    Review of Systems  Pertinent items are noted in HPI.    Objective:     No data found.  I/O last 3 completed shifts:  In: 3080 [P.O.:3080]  Out: -   No intake/output data recorded.    /64   Pulse 99   Temp 97.9 °F (36.6 °C) (Oral)   Resp 16   Ht 1.6 m (5' 3\")   Wt 61.6 kg (135 lb 12.9 oz)   SpO2 95%   BMI 24.06 kg/m²     General appearance: alert, appears stated age, and cooperative  Head: Normocephalic, without obvious abnormality, atraumatic  Lungs: clear to auscultation bilaterally  Heart: regular rate and rhythm, S1, S2 normal, no murmur, click, rub or gallop  Abdomen: soft, non-tender; bowel sounds normal; no masses,  no organomegaly  Extremities: extremities normal, atraumatic, no cyanosis or edema  Skin: Skin color, texture, turgor normal. No rashes or lesions  Neurologic:

## 2024-03-26 NOTE — PROGRESS NOTES
Pt has rested in bed through out the night.  She has had no complaints of pain or discomfort. She did go and have a doppler completed on her right leg due to increase swelling.  Nothing acute was reported.  This morning after using the restroom her 30 day monitor kept saying poor skin contact.  Replace the adhesive and reset the monitor and it is now connected well.

## 2024-03-26 NOTE — PLAN OF CARE
Problem: Discharge Planning  Goal: Discharge to home or other facility with appropriate resources  Note: SW met with patient on this date regarding discharge planning. Patient had questions regarding possible transition to [facility] of additional therapy. SW educated patient on the progress and Medicare benefits. SW informed patient of recommendation at discharge and provided additional education on home health vs outpatient therapy. FERNY looked into available outpatient evaluation at [facility] in [place] per her request. SW will follow and maintain involvement in discharge planning.

## 2024-03-26 NOTE — PROGRESS NOTES
Physical Medicine & Rehabilitation   Progress Note    Chief Complaint:  Debility 2/2 TKA and new onset a-fib       Subjective:  Patient seen today, up in chair. Patient feels things are going well. Discussed her progress thus far and the gains she has made. Patient asking about 24/7 assistance at discharge and whether that is needed. Explained with how well she is dong at this time, it is not likely needed. There maybe some things she would feel more comfortable doing when her family is present however, and for her to monitor the next couple days what that might be (bathing, dressing, cooking). Patient understanding, in agreement with plan for home with OP PT Friday. Patient would like [facility] at [facility]. Patient asking about driving, discussed awaiting ortho follow up and PT OP evals. Patient understanding. Denies any other questions or concerns.       Rehabilitation:   PT 03/26/2024:  Bed Mobility:  Supine to Sit: Supervision  Sit to Supine: Supervision   Transfers:  Sit to Stand: Supervision  Stand to Sit:Supervision  Car Transfer: drivers side, supervision  Ambulation:  Stand By Assistance  Distance: 125'x2, 200'x1, 225'x1  Surface: Level Tile  Device:Rolling Walker  Gait Deviations:  Slow Cammie, Decreased Step Length on Right, Decreased Weight Shift Left, Decreased Gait Speed, Increased Reliance on Rolling Walker, and decreased L knee flexion throughout swing phase.  Stairs:  Supervision  Number of Steps: 1 platform step with RW  Height: 6\" step with Rolling Walker  Balance:  Static Sitting Balance:  Modified Independent  Dynamic Sitting Balance: Modified Independent  Static Standing Balance: Supervision  Dynamic Standing Balance: Contact Guard Assistance, on AirEx  - Patient completing dynamic standing reaching task with patient completing reaching for rings and tossing towards target. Patient unsteady throughout activity however no LOB noted.  Exercise:  Patient was guided in 1 set(s) 10-15 reps of exercise  CTAB  Abdomen:  non-distended  Skin: warm and dry, no rash or erythema on exposed skin  Edema left lower extremity      Diagnostics:   No results found for this or any previous visit (from the past 24 hour(s)).      Impression:  Left TKA  New onset A-fib with RVR  Essential hypertension  Hyponatremia  Elevated liver enzymes  Impaired mobility     Plan:  Continue inpatient rehabilitation program involving at least 3 hours per day, 5 days per week of intensive rehabilitation.  Rehabilitation services will include PT and OT/RT in order to improve functional status prior to discharge.  Family education and training will be completed.  Equipment evaluations and recommendations will be completed as appropriate.   Rehabilitation nursing will be involved for bowel, bladder, skin, and pain management.  Nursing will also provide education and training to patient and family.    Dr. Gottlieb consulted for medical management   Left TKA: On 3/11/2024 by Dr. Salazar. Per ortho, follow-up rescheduled to 4/2/2024 at 0900  Doppler of left lower extremity was obtained 3/25 and reportedly negative for any DVT  New onset A-fib: Continue metoprolol 25 mg BID, amiodarone 200 mg BID for total of one week folled by 200 mg daily, Eliquis 5 mg twice daily.  Patient with a cardiac event monitor placed prior to discharge from acute care.  Patient will need follow-up with cardiology as an outpatient.  History of hypertension: On bisoprolol-HCTZ at home.  This was discontinued in acute care due to initiation of metoprolol-XL.  Will monitor BP.  Hyponatremia:  Per acute care DC summary, thought to be related to use of HCTZ at home.  HCTZ was discontinued.    Transaminitis: In acute care, resolved on admission labs  Prophylaxis:  DVT: Eliquis.    Pain: As needed Tylenol and as needed tramadol.  We discussed that patient had recently transition from Norco to tramadol. Both were ordered in acute care-  Radom d/c'd.   Nutrition:  Consultation to

## 2024-03-27 LAB — MAGNESIUM SERPL-MCNC: 1.9 MG/DL (ref 1.6–2.4)

## 2024-03-27 PROCEDURE — 36415 COLL VENOUS BLD VENIPUNCTURE: CPT

## 2024-03-27 PROCEDURE — 97530 THERAPEUTIC ACTIVITIES: CPT

## 2024-03-27 PROCEDURE — 1180000000 HC REHAB R&B

## 2024-03-27 PROCEDURE — 99232 SBSQ HOSP IP/OBS MODERATE 35: CPT | Performed by: NURSE PRACTITIONER

## 2024-03-27 PROCEDURE — 6370000000 HC RX 637 (ALT 250 FOR IP): Performed by: REGISTERED NURSE

## 2024-03-27 PROCEDURE — 97116 GAIT TRAINING THERAPY: CPT

## 2024-03-27 PROCEDURE — 6370000000 HC RX 637 (ALT 250 FOR IP): Performed by: STUDENT IN AN ORGANIZED HEALTH CARE EDUCATION/TRAINING PROGRAM

## 2024-03-27 PROCEDURE — 97535 SELF CARE MNGMENT TRAINING: CPT

## 2024-03-27 PROCEDURE — 83735 ASSAY OF MAGNESIUM: CPT

## 2024-03-27 PROCEDURE — 97110 THERAPEUTIC EXERCISES: CPT

## 2024-03-27 RX ADMIN — METOPROLOL TARTRATE 25 MG: 25 TABLET, FILM COATED ORAL at 21:27

## 2024-03-27 RX ADMIN — Medication 3 MG: at 21:27

## 2024-03-27 RX ADMIN — Medication 1 TABLET: at 08:56

## 2024-03-27 RX ADMIN — AMIODARONE HYDROCHLORIDE 200 MG: 200 TABLET ORAL at 08:56

## 2024-03-27 RX ADMIN — APIXABAN 5 MG: 5 TABLET, FILM COATED ORAL at 08:56

## 2024-03-27 RX ADMIN — METOPROLOL TARTRATE 25 MG: 25 TABLET, FILM COATED ORAL at 08:56

## 2024-03-27 RX ADMIN — APIXABAN 5 MG: 5 TABLET, FILM COATED ORAL at 21:27

## 2024-03-27 RX ADMIN — ACETAMINOPHEN 1000 MG: 500 TABLET ORAL at 08:57

## 2024-03-27 ASSESSMENT — PAIN - FUNCTIONAL ASSESSMENT: PAIN_FUNCTIONAL_ASSESSMENT: ACTIVITIES ARE NOT PREVENTED

## 2024-03-27 ASSESSMENT — PAIN SCALES - WONG BAKER: WONGBAKER_NUMERICALRESPONSE: NO HURT

## 2024-03-27 ASSESSMENT — PAIN DESCRIPTION - DESCRIPTORS: DESCRIPTORS: ACHING

## 2024-03-27 ASSESSMENT — PAIN DESCRIPTION - ORIENTATION: ORIENTATION: LEFT

## 2024-03-27 ASSESSMENT — PAIN DESCRIPTION - PAIN TYPE: TYPE: ACUTE PAIN

## 2024-03-27 ASSESSMENT — PAIN SCALES - GENERAL
PAINLEVEL_OUTOF10: 3
PAINLEVEL_OUTOF10: 3

## 2024-03-27 ASSESSMENT — PAIN DESCRIPTION - FREQUENCY: FREQUENCY: INTERMITTENT

## 2024-03-27 ASSESSMENT — PAIN DESCRIPTION - LOCATION: LOCATION: KNEE

## 2024-03-27 NOTE — PROGRESS NOTES
to purchase for home.  No further questions at this time.  .  IADL:   Meal Preparation: Patient completed IADL homemaking task this date of preparing familiar task of oatmeal in the microwave - task was graded to challenge walker safety and activity tolerance.  Patient was able to retrieve all items throughout kitchen with jennifer i and no VCs for safety during the retrieval and transportation of items. Patient required mod I throughout task with a standing endurance of 11 minutes.   Community Re-Integration: Patient ambulated from [facility], through [facility] lobby and over to [employer] shop where OT facilitated IADL community re-integration task of going to hospital gift shop that was facilitated to challenge: navigating RW in tight spaces. Education provided on turning RW sideways to side step through tight space. Pt required no cues for walker safety. Pt completed task with mod I, demo'ing good tolerance and exhibiting min fatigue.     Education provided on safety strategies during community navigation, including monitoring RW around rugs and fall prevention.   Community re-entry: OT facilitated community task of attending [employer] services. Pt reports she typically goes to [facility] in [facility] and plans on a friend taking her. Discussed through parking and navigating ramp to enter [employer]. OT facilitated community task of navigating [facility] chapel. Pt folded and unfolded her walker with mod I and was able to enter and exit [facility] pew with mod I.   BALANCE:  Sitting Balance:  Independent.    Standing Balance: Modified Independent. Tolerated standing x 11 minutes during discussions       Review of Systems:  CONSTITUTIONAL:  negative for  fevers and chills; positive for decreased appetite  EYES:  negative for  visual disturbance and irritation  HEENT:  negative for  hearing loss and voice change  RESPIRATORY:  negative for  dyspnea and wheezing  CARDIOVASCULAR:  negative for  chest pain or palpitations  GASTROINTESTINAL:  negative  for nausea, vomiting, and diarrhea  GENITOURINARY:  negative for frequency and dysuria  SKIN:  negative for rash  MUSCULOSKELETAL:  positive for  arthralgias, pain, joint swelling, decreased range of motion, and bone pain  NEUROLOGICAL:  positive for gait problems and weakness  BEHAVIOR/PSYCH:  negative  System review otherwise negative    Objective:  BP (!) 129/58   Pulse 98   Temp 98.1 °F (36.7 °C) (Oral)   Resp 16   Ht 1.6 m (5' 3\")   Wt 60.9 kg (134 lb 4.2 oz)   SpO2 100%   BMI 23.78 kg/m²   Patient is awake and alert  Orientation: oriented within conversation  Mood: within normal limits  Affect: calm  General appearance: Patient is well nourished, well developed, well groomed and in no acute distress     Memory: appropriate within conversation  Attention/Concentration: normal  Language:  normal     Cranial Nerves:  cranial nerves II-XII are grossly intact  ROM:  abnormal -left lower extremity  Tone:  normal  Muscle bulk: within normal limits  No calf tenderness  Gait: Not assessed    Lungs: Breathing comfortably on room air without any increased work of breathing, CTAB  Abdomen:  non-distended  Skin: warm and dry, no rash or erythema on exposed skin  Edema left lower extremity      Diagnostics:   Recent Results (from the past 24 hour(s))   Magnesium    Collection Time: 03/27/24  6:23 AM   Result Value Ref Range    Magnesium 1.9 1.6 - 2.4 mg/dL         Impression:  Left TKA  New onset A-fib with RVR  Essential hypertension  Hyponatremia  Elevated liver enzymes  Impaired mobility     Plan:  Continue inpatient rehabilitation program involving at least 3 hours per day, 5 days per week of intensive rehabilitation.  Rehabilitation services will include PT and OT/RT in order to improve functional status prior to discharge.  Family education and training will be completed.  Equipment evaluations and recommendations will be completed as appropriate.   Rehabilitation nursing will be involved for bowel, bladder,

## 2024-03-27 NOTE — CARE COORDINATION
Released to be independent in room per therapy.  Discharging 3/29. Follow ups scheduled. No concerns expressed this shift. Stated she had loose stools yesterday but that is has resolved d/t Sajan and Magnesium being discontinued.

## 2024-03-27 NOTE — PROGRESS NOTES
[facility]  Recreational Therapy  Daily Note  [facility]    Time Spent with Patient: 10 minutes    Date:  3/27/2024       Patient Name: Kirstin Estrada      MRN: [id number]      YOB: 1945 (78 y.o.)       Gender: female  Diagnosis: History of total knee arthroplasty  Referring Practitioner: Dr KATIE Crespo    RESTRICTIONS/PRECAUTIONS:  Restrictions/Precautions: Weight Bearing, Fall Risk     Hearing: Within functional limits    PAIN: 0    SUBJECTIVE:  Yes    OBJECTIVE:  Pt would like to get her hair washed and styled by our beautician tomorrow -affect bright and social-appreciative          Patient Education  New Education Provided: Importance of Leisure,     Electronically signed by: Elis Gonzalez, CTRS  Date: 3/27/2024

## 2024-03-27 NOTE — PLAN OF CARE
Problem: Discharge Planning  Goal: Discharge to home or other facility with appropriate resources  3/26/2024 2331 by Evy Gross RN  Outcome: Progressing  Flowsheets (Taken 3/26/2024 2331)  Discharge to home or other facility with appropriate resources: Identify barriers to discharge with patient and caregiver     Problem: Safety - Adult  Goal: Free from fall injury  3/26/2024 2331 by Evy Gross RN  Outcome: Progressing  Flowsheets (Taken 3/26/2024 2331)  Free From Fall Injury: Instruct family/caregiver on patient safety  Note: Patient has remained free of physical injury during this shift. Safe environment provided, call light within reach, and hourly rounding completed.      Problem: ABCDS Injury Assessment  Goal: Absence of physical injury  3/26/2024 2331 by Evy Gross RN  Outcome: Progressing  Flowsheets (Taken 3/26/2024 2331)  Absence of Physical Injury: Implement safety measures based on patient assessment  Note: Patient has remained free of physical injury during this shift. Safe environment provided, call light within reach, and hourly rounding completed.      Problem: Pain  Goal: Verbalizes/displays adequate comfort level or baseline comfort level  Outcome: Progressing  Flowsheets (Taken 3/26/2024 2331)  Verbalizes/displays adequate comfort level or baseline comfort level: Encourage patient to monitor pain and request assistance   Care plan reviewed with patient.  Patient  verbalize understanding of the plan of care and contribute to goal setting.

## 2024-03-27 NOTE — PROGRESS NOTES
[facility]  INPATIENT PHYSICAL THERAPY  PROGRESS NOTE  [facility] - 8K-15/015-A    Time In: 1330  Time Out: 1500  Timed Code Treatment Minutes: 90 Minutes  Minutes: 90          Date: 3/27/2024  Patient Name: Kirstin Estrada,  Gender:  female        MRN: [id number]  : 1945  (78 y.o.)     Referring Practitioner: Beth Crespo DO  Diagnosis: History of Total knee arthroplasty, left  Additional Pertinent Hx: Kirstin Estrada is a 78 y.o. female with PMH significant for hypertension, hyperlipidemia, and hyponatremia who was admitted to [facility] on 3/11/2024.  Patient initially presented to [facility] ED on 3/11/2024 from [facility] for evaluation of new onset A-fib with RVR and hypotension during procedure.  Per report, patient underwent a left total knee replacement by Dr. Salazar at [facility] on 3/11/2024.  Patient reportedly tolerated the procedure well, however, after the patient procedure she was noted to have new onset of A-fib with RVR along with hypotension.  Patient was given beta-blocker and IV fluids and transferred to [facility] for further evaluation management.  On arrival to ED, patient's heart rate noted to be in the 130s.   On admission Ortho and cardiology were consulted.  Per Ortho, patient is WBAT to left lower extremity.   Cardiology evaluated patient.  Patient noted to have an elevated BNP which was thought to likely be related to A-fib.  Recommending a 30-day event monitor to determine burden and need for chronic OAC.     During acute care stay, patient was in and out of A-fib with RVR.  On multiple occasions, plan was to pursue cardioversion, however, patient will then return to normal sinus rhythm.  Patient was started on Eliquis 5 mg twice daily.  Additionally, beta-blocker was increased.  Patient was transitioned from IV amiodarone to amiodarone 200 mg daily.  Patient will need close outpatient follow-up with cardiology.  Pt with rapid response on evening  reps  Height: 4\" step with Bilateral Handrails    Balance:  Static Sitting Balance:  Supervision  Dynamic Sitting Balance: Supervision  Static Standing Balance: Supervision  Dynamic Standing Balance: Supervision    Exercise:  Patient was guided in 1 set(s) 10 reps of exercise to left lower extremities.  Ankle pumps, Glut sets, Quad sets, Heelslides, Short arc quads, Hip abduction/adduction, and Straight leg raises.  Exercises were completed for increased independence with functional mobility.  NuStep workload 1 x 10 min increasing seat up to 7 to promote left knee flexion. Left knee flexion at 90 degrees for AAROM without assist from PTA. L knee flexion with PTA assist at 95 degrees.    Functional Outcome Measures: Completed  Balance Score: 11  Gait Score: 6  Tinetti Total Score: 17  Modified Marne Scale:  Not Applicable    ASSESSMENT:  Assessment: Patient progressing toward established goals.    PT Assessment: Patient overall is making progress with skilled PT treatment and has met 4/5 STG's and 0/6 LTG's. Patient has progressed to Mod I for bed mobility, Mod I for sit<>stand transfers and Supervision for car transfers. Patient requires Mod I to supervision for ambulation up to 300 feet with use of RW with decreased L knee flexion and increased reliance on BUE support on RW. Patient is able to ascend/descend steps with B handrail and CGA. Patient improved her Tinetti balance test from 12/28 to 17/28 indicating she is high risk for falls however her balance is improving. Patient has improved her L knee flexion AAROM to 90 degrees. Patient overall continues to demonstrate decreased strength in LLE and decreased L knee ROM resulting in difficulty with functional mobility. Patient overall can contiue to benefit from skilled PT treatment in order to assist with BLE strengthening, gait training, transfer training, core strengthening and dynamic balance for increased functional mobility.   Activity Tolerance:  Patient

## 2024-03-27 NOTE — PROGRESS NOTES
[facility]  Inpatient Rehabilitation  Occupational Therapy  Progress Note  Time:  Time In: 1100  Time Out: 1230  Timed Code Treatment Minutes: 90 Minutes  Minutes: 90          Date: 3/27/2024  Patient Name: Kirstin Estrada,   Gender: female      Room: [facility]15/015-A  MRN: [id number]  : 1945  (78 y.o.)  Referring Practitioner: Dr KATIE Crespo  Diagnosis: History of total knee arthroplasty  Additional Pertinent Hx: Kirstin Estrada is a 78 y.o. female with PMH significant for hypertension, hyperlipidemia, and hyponatremia who was admitted to [facility] on 3/11/2024.  Patient initially presented to [facility] ED on 3/11/2024 from [facility] for evaluation of new onset A-fib with RVR and hypotension during procedure.  Per report, patient underwent a left total knee replacement by Dr. Salazar at [facility] on 3/11/2024.  Patient reportedly tolerated the procedure well, however, after the patient procedure she was noted to have new onset of A-fib with RVR along with hypotension.  Patient was given beta-blocker and IV fluids and transferred to [facility] for further evaluation management.  On arrival to ED, patient's heart rate noted to be in the 130s.   On admission Ortho and cardiology were consulted.  Per Ortho, patient is WBAT to left lower extremity.   Cardiology evaluated patient.  Patient noted to have an elevated BNP which was thought to likely be related to A-fib.  Recommending a 30-day event monitor to determine burden and need for chronic OAC.     During acute care stay, patient was in and out of A-fib with RVR.  On multiple occasions, plan was to pursue cardioversion, however, patient will then return to normal sinus rhythm.  Patient was started on Eliquis 5 mg twice daily.  Additionally, beta-blocker was increased.  Patient was transitioned from IV amiodarone to amiodarone 200 mg daily.  Patient will need close outpatient follow-up with cardiology.  Pt with rapid response on evening of 3/19 and found to be in

## 2024-03-27 NOTE — PROGRESS NOTES
[facility]  INPATIENT REHABILITATION  TEAM CONFERENCE NOTE    Conference Date: 3/28/2024  Admit Date:  3/19/2024  2:41 PM  Patient Name: Kirstin Estrada    MRN: [id number]    : 1945  (78 y.o.)  Rehabilitation Admitting Diagnosis:  History of total knee arthroplasty, left [Z96.652]  Referring Practitioner: Beth Crespo DO      CASE MANAGEMENT  Current issues/needs regarding patient and family discharge status: Patient continues to be motivated and progressing with therapy. Patient plans to be discharged on Friday, 3/29 with outpatient PT. Patient is considering home health services as well. SW will follow and maintain involvement in discharge planning.    PHYSICAL THERAPY  Patient overall is making progress with skilled PT treatment and has met 4/5 STG's and 0/6 LTG's. Patient has progressed to Mod I for bed mobility, Mod I for sit<>stand transfers and Supervision for car transfers. Patient requires Mod I to supervision for ambulation up to 300 feet with use of RW with decreased L knee flexion and increased reliance on BUE support on RW. Patient is able to ascend/descend steps with B handrail and CGA. Patient improved her Tinetti balance test from  to  indicating she is high risk for falls however her balance is improving. Patient has improved her L knee flexion AAROM to 90 degrees. Patient overall continues to demonstrate decreased strength in LLE and decreased L knee ROM resulting in difficulty with functional mobility. Patient overall can contiue to benefit from skilled PT treatment in order to assist with BLE strengthening, gait training, transfer training, core strengthening and dynamic balance for increased functional mobility.  Equipment Needed: Yes (Ordered RW)    SPEECH THERAPY       OCCUPATIONAL THERAPY  Kirstin Estrada is a 78 y.o.female that presents with above new performance deficits secondary to L TKA and new onset Atrial fibrillation. Pt has progressed to emerging

## 2024-03-27 NOTE — PLAN OF CARE
Problem: Discharge Planning  Goal: Discharge to home or other facility with appropriate resources  3/27/2024 1025 by Nubia Walker RN  Outcome: Progressing  Flowsheets (Taken 3/27/2024 1025)  Discharge to home or other facility with appropriate resources:   Identify barriers to discharge with patient and caregiver   Identify discharge learning needs (meds, wound care, etc)     Problem: Safety - Adult  Goal: Free from fall injury  3/27/2024 1025 by Nubia Walker RN  Outcome: Progressing  Flowsheets (Taken 3/27/2024 1025)  Free From Fall Injury:   Instruct family/caregiver on patient safety   Based on caregiver fall risk screen, instruct family/caregiver to ask for assistance with transferring infant if caregiver noted to have fall risk factors       Problem: Pain  Goal: Verbalizes/displays adequate comfort level or baseline comfort level  3/27/2024 1025 by Nubia Walker RN  Outcome: Progressing  Flowsheets (Taken 3/27/2024 1025)  Verbalizes/displays adequate comfort level or baseline comfort level:   Encourage patient to monitor pain and request assistance   Assess pain using appropriate pain scale         Problem: Cardiovascular - Adult  Goal: Maintains optimal cardiac output and hemodynamic stability  3/27/2024 1025 by Nubia Walker RN  Outcome: Progressing  Flowsheets (Taken 3/27/2024 1025)  Maintains optimal cardiac output and hemodynamic stability: Monitor blood pressure and heart rate

## 2024-03-27 NOTE — PROGRESS NOTES
[facility]  Recreational Therapy  Daily Note  [facility]    Time Spent with Patient: 0 minutes    Date:  3/27/2024       Patient Name: Kirstin Estrada      MRN: [id number]      YOB: 1945 (78 y.o.)       Gender: female  Diagnosis: History of total knee arthroplasty  Referring Practitioner: Dr KATIE Crespo    Patient enjoyed spending time with our pet therapy dogs. Pt took a brief break from PT in the gym to pet our pet therapy dog May and to talk with Saleem owner about a trip they went on together years ago-affect bright and social     Electronically signed by: Elis Gonzalez, CTRS  Date: 3/27/2024

## 2024-03-27 NOTE — PROGRESS NOTES
Patient: Kirstin Estrada  Unit/Bed: 8K-15/015-A  YOB: 1945  MRN: [id number] Acct: [id number]   Admitting Diagnosis: History of total knee arthroplasty, left [Z96.652]  Admit Date:  3/19/2024  Hospital Day: 8    Assessment:     Principal Problem:    History of total knee arthroplasty, left  Resolved Problems:    * No resolved hospital problems. *      Plan:     We discussed the rhythm looking well recently and the magnesium being acceptable.        Subjective:     Patient has no complaint of CP or SOB..   Medication side effects: none    Scheduled Meds:   amiodarone  200 mg Oral Daily    docusate sodium  100 mg Oral BID    diclofenac sodium  4 g Topical BID    metoprolol tartrate  25 mg Oral BID    multivitamin  1 tablet Oral Daily    apixaban  5 mg Oral BID    [Held by provider] magnesium oxide  400 mg Oral Daily     Continuous Infusions:   sodium chloride       PRN Meds:sodium chloride flush, sodium chloride, polyethylene glycol, potassium chloride **OR** potassium alternative oral replacement **OR** potassium chloride, magnesium sulfate, melatonin, traMADol **OR** traMADol, acetaminophen, ondansetron    Review of Systems  Pertinent items are noted in HPI.    Objective:     Patient Vitals for the past 8 hrs:   BP Temp Temp src Pulse Resp SpO2 Weight   03/27/24 0853 (!) 129/58 98.1 °F (36.7 °C) Oral 98 16 100 % --   03/27/24 0514 -- -- -- -- -- -- 60.9 kg (134 lb 4.2 oz)     I/O last 3 completed shifts:  In: 1640 [P.O.:1640]  Out: -   I/O this shift:  In: 840 [P.O.:840]  Out: -     BP (!) 129/58   Pulse 98   Temp 98.1 °F (36.7 °C) (Oral)   Resp 16   Ht 1.6 m (5' 3\")   Wt 60.9 kg (134 lb 4.2 oz)   SpO2 100%   BMI 23.78 kg/m²     General appearance: alert, appears stated age, and cooperative  Head: Normocephalic, without obvious abnormality, atraumatic  Lungs: clear to auscultation bilaterally  Heart: regular rate and rhythm, S1, S2 normal, no murmur, click, rub or gallop  Abdomen: soft,

## 2024-03-28 LAB
EKG ATRIAL RATE: 86 BPM
EKG P AXIS: 54 DEGREES
EKG P-R INTERVAL: 174 MS
EKG Q-T INTERVAL: 390 MS
EKG QRS DURATION: 74 MS
EKG QTC CALCULATION (BAZETT): 466 MS
EKG R AXIS: 87 DEGREES
EKG T AXIS: 68 DEGREES
EKG VENTRICULAR RATE: 86 BPM

## 2024-03-28 PROCEDURE — 6370000000 HC RX 637 (ALT 250 FOR IP): Performed by: REGISTERED NURSE

## 2024-03-28 PROCEDURE — 6370000000 HC RX 637 (ALT 250 FOR IP): Performed by: STUDENT IN AN ORGANIZED HEALTH CARE EDUCATION/TRAINING PROGRAM

## 2024-03-28 PROCEDURE — 97530 THERAPEUTIC ACTIVITIES: CPT

## 2024-03-28 PROCEDURE — 97535 SELF CARE MNGMENT TRAINING: CPT

## 2024-03-28 PROCEDURE — 97116 GAIT TRAINING THERAPY: CPT

## 2024-03-28 PROCEDURE — 1180000000 HC REHAB R&B

## 2024-03-28 PROCEDURE — 99231 SBSQ HOSP IP/OBS SF/LOW 25: CPT | Performed by: NURSE PRACTITIONER

## 2024-03-28 PROCEDURE — 97110 THERAPEUTIC EXERCISES: CPT

## 2024-03-28 RX ORDER — AMIODARONE HYDROCHLORIDE 200 MG/1
200 TABLET ORAL DAILY
Qty: 30 TABLET | Refills: 2 | Status: SHIPPED | OUTPATIENT
Start: 2024-03-29

## 2024-03-28 RX ORDER — LANOLIN ALCOHOL/MO/W.PET/CERES
3 CREAM (GRAM) TOPICAL NIGHTLY PRN
COMMUNITY
Start: 2024-03-28

## 2024-03-28 RX ORDER — ACETAMINOPHEN 500 MG
1000 TABLET ORAL EVERY 6 HOURS PRN
Qty: 120 TABLET | Refills: 3 | COMMUNITY
Start: 2024-03-28

## 2024-03-28 RX ADMIN — METOPROLOL TARTRATE 25 MG: 25 TABLET, FILM COATED ORAL at 19:46

## 2024-03-28 RX ADMIN — METOPROLOL TARTRATE 25 MG: 25 TABLET, FILM COATED ORAL at 09:09

## 2024-03-28 RX ADMIN — APIXABAN 5 MG: 5 TABLET, FILM COATED ORAL at 09:09

## 2024-03-28 RX ADMIN — ACETAMINOPHEN 1000 MG: 500 TABLET ORAL at 16:58

## 2024-03-28 RX ADMIN — ACETAMINOPHEN 1000 MG: 500 TABLET ORAL at 09:15

## 2024-03-28 RX ADMIN — DOCUSATE SODIUM 100 MG: 100 CAPSULE, LIQUID FILLED ORAL at 19:46

## 2024-03-28 RX ADMIN — DICLOFENAC SODIUM 4 G: 10 GEL TOPICAL at 19:46

## 2024-03-28 RX ADMIN — Medication 1 TABLET: at 09:09

## 2024-03-28 RX ADMIN — AMIODARONE HYDROCHLORIDE 200 MG: 200 TABLET ORAL at 09:09

## 2024-03-28 RX ADMIN — APIXABAN 5 MG: 5 TABLET, FILM COATED ORAL at 19:46

## 2024-03-28 ASSESSMENT — PAIN SCALES - WONG BAKER
WONGBAKER_NUMERICALRESPONSE: NO HURT
WONGBAKER_NUMERICALRESPONSE: NO HURT

## 2024-03-28 ASSESSMENT — PAIN SCALES - GENERAL: PAINLEVEL_OUTOF10: 6

## 2024-03-28 NOTE — PLAN OF CARE
medications, banking, etc.? I am willing to help the patient with a lot of daily activities such as shopping, errands, taking to appointments, medications, etc. (4)   18. How concerned are you about your ability to continue providing care for the patient next year? I am a little concerned about my ability to continue providing care for the patient for the next year. (3)   19. Is there enough money available to pay for things not paid by insurance, Social Security income, Workers compensation, In Home Support Services, or other benefits (for example medications, someone to help with personal care, medical equipment, shower chair, co-pays)? We have enough money to pay for things not covered by insurance or other benefits. (4)   20. Will there be any accessibility problems for the patient getting around in the house or using the toilet or shower (for example, the width of doorways, stairs, ramp access) in the home where he/she will be living? There will not be any accessibility problems for the patient in the home. (4)   21. Will you need to make any changes to the home (e.g. ramp, widen doors) to make it accessible? No changes need to be made to make the home accessible. (4)   22. Is there enough money available to pay for the necessary changes to the home to make it accessible? We have enough money to pay for the necessary changes to the home to make it accessible. (4)   23. Will the patient have accessible transportation (e.g. care that he/she can get in and out of, someone to drive, Paratransit, etc.) that he/she can use to go places (e.g. the doctor, grocery store)? I am very certain that the patient will have accessible transportation that he/she can use to go places. (4)   24. Thinking over the past year, how much conflict have you had in your relationship with the patient? We do not have conflict between us. (4)   25. How mentally prepared are you to be a caregiver? I am very mentally prepared to be a

## 2024-03-28 NOTE — DISCHARGE SUMMARY
Cardiology follow up and medications were adjusted. Plan to keep patient overnight and monitor HR. If HR stable through the night, planning discharge for 3/30/24.     Appropriate stroke prophylaxis was maintained throughout rehabilitation stay with Eliquis.      Appropriate DVT prophylaxis options were continued throughout rehabilitation stay with Eliquis.    Dr Liriano followed during the IPR stay for medical management    Patient was discharged Home in Stable condition.    Consults:   Family Medicine, cardiology    Significant Diagnostics:   CBC:   Lab Results   Component Value Date/Time    WBC 9.1 03/25/2024 06:04 AM    RBC 3.56 03/25/2024 06:04 AM    RBC 4.35 11/21/2018 09:08 AM    HGB 10.9 03/25/2024 06:04 AM    HCT 33.8 03/25/2024 06:04 AM    MCV 94.9 03/25/2024 06:04 AM    MCH 30.6 03/25/2024 06:04 AM    MCHC 32.2 03/25/2024 06:04 AM    RDW 11.8 11/21/2018 09:08 AM     03/25/2024 06:04 AM    MPV 8.2 03/25/2024 06:04 AM     BMP:    Lab Results   Component Value Date/Time     03/29/2024 09:34 AM    K 5.2 03/29/2024 09:34 AM    K 3.8 03/20/2024 12:12 AM    CL 98 03/29/2024 09:34 AM    CO2 26 03/29/2024 09:34 AM    BUN 12 03/29/2024 09:34 AM    LABALBU 3.1 03/20/2024 12:12 AM    CREATININE 0.7 03/29/2024 09:34 AM    CALCIUM 9.0 03/29/2024 09:34 AM    LABGLOM 88 03/29/2024 09:34 AM    GLUCOSE 96 03/29/2024 09:34 AM    GLUCOSE 110 11/21/2018 09:08 AM     Hepatic Function Panel:    Lab Results   Component Value Date/Time    ALKPHOS 62 03/20/2024 12:12 AM    ALT 23 03/20/2024 12:12 AM    AST 26 03/20/2024 12:12 AM    PROT 5.8 03/20/2024 12:12 AM    BILITOT 0.5 03/20/2024 12:12 AM    BILIDIR <0.2 03/19/2024 08:36 AM    LABALBU 3.1 03/20/2024 12:12 AM     Albumin:    Lab Results   Component Value Date/Time    LABALBU 3.1 03/20/2024 12:12 AM     Calcium:    Lab Results   Component Value Date/Time    CALCIUM 9.0 03/29/2024 09:34 AM     Magnesium:    Lab Results   Component Value Date/Time    MG 1.9  VITAMIN DAILY PO) Take 1 tablet by mouth daily              Controlled substances monitoring: not applicable.     35 minutes spent preparing the patient for discharge    Maryan Wilkes, APRN - CNP

## 2024-03-28 NOTE — PLAN OF CARE
Problem: Discharge Planning  Goal: Discharge to home or other facility with appropriate resources  3/28/2024 1757 by Clotilde Bragg RN  Outcome: Progressing     Problem: Safety - Adult  Goal: Free from fall injury  Outcome: Progressing     Problem: ABCDS Injury Assessment  Goal: Absence of physical injury  Outcome: Progressing     Problem: Pain  Goal: Verbalizes/displays adequate comfort level or baseline comfort level  Outcome: Progressing     Problem: Skin/Tissue Integrity  Goal: Absence of new skin breakdown  Description: 1.  Monitor for areas of redness and/or skin breakdown  2.  Assess vascular access sites hourly  3.  Every 4-6 hours minimum:  Change oxygen saturation probe site  4.  Every 4-6 hours:  If on nasal continuous positive airway pressure, respiratory therapy assess nares and determine need for appliance change or resting period.  Outcome: Progressing     Problem: Skin/Tissue Integrity  Goal: Absence of new skin breakdown  Description: 1.  Monitor for areas of redness and/or skin breakdown  2.  Assess vascular access sites hourly  3.  Every 4-6 hours minimum:  Change oxygen saturation probe site  4.  Every 4-6 hours:  If on nasal continuous positive airway pressure, respiratory therapy assess nares and determine need for appliance change or resting period.  Outcome: Progressing     Problem: Cardiovascular - Adult  Goal: Maintains optimal cardiac output and hemodynamic stability  Outcome: Progressing     Problem: Cardiovascular - Adult  Goal: Absence of cardiac dysrhythmias or at baseline  Outcome: Progressing     Problem: Musculoskeletal - Adult  Goal: Return mobility to safest level of function  Outcome: Progressing

## 2024-03-28 NOTE — DISCHARGE INSTR - COC
Continuity of Care Form    Patient Name: Kirstin Estrada   :  1945  MRN:  [id number]    Admit date:  3/19/2024  Discharge date:  ***    Code Status Order: Full Code   Advance Directives:     Admitting Physician:  Beth Crespo DO  PCP: Evelio Hennessy MD    Discharging Nurse: ***  Discharging Hospital Unit/Room#: 8K-15/015-A  Discharging Unit Phone Number: ***    Emergency Contact:   Extended Emergency Contact Information  Primary Emergency Contact: Nura Estrada  Address: [address] [place] [address] [facility]  Mobile Phone: [phone]  Relation: Child  Secondary Emergency Contact: EvetteYassine  Address: [address] [place] [address]  Mobile Phone: [phone]  Relation: Child    Past Surgical History:  Past Surgical History:   Procedure Laterality Date    HYSTERECTOMY (CERVIX STATUS UNKNOWN)      OVARY REMOVAL         Immunization History:   Immunization History   Administered Date(s) Administered    TDaP, ADACEL (age 10y-64y), BOOSTRIX (age 10y+), IM, 0.5mL 2017       Active Problems:  Patient Active Problem List   Diagnosis Code    New onset atrial fibrillation (HCC) I48.91    History of essential hypertension Z86.79    S/P TKR (total knee replacement), left Z96.652    Chronic hyponatremia E87.1    Elevated liver enzymes R74.8    Leukocytosis D72.829    Atrial fibrillation with rapid ventricular response (HCC) I48.91    S/P total knee arthroplasty, left Z96.652    History of total knee arthroplasty, left Z96.652       Isolation/Infection:   Isolation            No Isolation          Patient Infection Status       None to display            Nurse Assessment:  Last Vital Signs: /62   Pulse 96   Temp 98.3 °F (36.8 °C) (Oral)   Resp 16   Ht 1.6 m (5' 3\")   Wt 61.3 kg (135 lb 2.3 oz)   SpO2 100%   BMI 23.94 kg/m²     Last documented pain score (0-10 scale): Pain Level: 6  Last Weight:   Wt Readings from Last 1 Encounters:    03/28/24 61.3 kg (135 lb 2.3 oz)     Mental Status:  {IP PT MENTAL STATUS:20030}    IV Access:  { VINICIO IV ACCESS:120257728}    Nursing Mobility/ADLs:  Walking   {CHP DME ADLs:172830872}  Transfer  {CHP DME ADLs:872073359}  Bathing  {CHP DME ADLs:000935792}  Dressing  {CHP DME ADLs:335739880}  Toileting  {CHP DME ADLs:721156062}  Feeding  {CHP DME ADLs:683151027}  Med Admin  {CHP DME ADLs:270347809}  Med Delivery   { VINICIO MED Delivery:616209683}    Wound Care Documentation and Therapy:  Incision 03/14/24 Knee Left;Anterior (Active)   Wound Image   03/24/24 0953   Dressing Status Clean;Dry;Intact 03/28/24 0915   Dressing Change Due 03/27/24 03/27/24 0900   Incision Cleansed Betadine/povidone iodine 03/28/24 0915   Dressing/Treatment ABD pad;Tape/Soft cloth adhesive tape 03/28/24 0915   Incision Length (cm) 15.5 03/20/24 1727   Incision Width (cm) 1.5 cm 03/20/24 1727   Closure [place] 03/28/24 0915   Margins Approximated 03/28/24 0915   Incision Assessment Dry 03/25/24 0852   Drainage Amount None (dry) 03/28/24 0915   Drainage Description Serosanguinous 03/25/24 0852   Odor None 03/28/24 0915   Dianne-incision Assessment Intact 03/28/24 0915   Number of days: 14        Elimination:  Continence:   Bowel: {YES / NO:19727}  Bladder: {YES / NO:19727}  Urinary Catheter: {Urinary Catheter:159387375}   Colostomy/Ileostomy/Ileal Conduit: {YES / NO:19727}       Date of Last BM: ***    Intake/Output Summary (Last 24 hours) at 3/28/2024 1518  Last data filed at 3/28/2024 1347  Gross per 24 hour   Intake 680 ml   Output --   Net 680 ml     I/O last 3 completed shifts:  In: 1240 [P.O.:1240]  Out: -     Safety Concerns:     { VINICIO Safety Concerns:466167045}    Impairments/Disabilities:      { VINICIO Impairments/Disabilities:172918009}    Nutrition Therapy:  Current Nutrition Therapy:   { VINICIO Diet List:592525041}    Routes of Feeding: {CH DME Other Feedings:523088419}  Liquids: {Slp liquid thickness:94196}  Daily Fluid

## 2024-03-28 NOTE — PROGRESS NOTES
[facility]  Recreational Therapy  Daily Note  [facility]    Time Spent with Patient: 25 minutes    Date:  3/28/2024       Patient Name: Kirstin Estrada      MRN: [id number]      YOB: 1945 (78 y.o.)       Gender: female  Diagnosis: History of total knee arthroplasty  Referring Practitioner: Dr KATIE Crespo    RESTRICTIONS/PRECAUTIONS:  Restrictions/Precautions: Weight Bearing, Fall Risk     Hearing: Within functional limits    PAIN: 0    SUBJECTIVE:  this felt so good    OBJECTIVE:  Pt enjoyed getting her hair washed and styled by our beautician-affect bright and social-looking forward to going home tomorrow -appreciative          Patient Education  New Education Provided: Importance of Leisure,     Electronically signed by: Elis Gonzalez, CTRS  Date: 3/28/2024

## 2024-03-28 NOTE — PLAN OF CARE
Problem: Discharge Planning  Goal: Discharge to home or other facility with appropriate resources  3/27/2024 2313 by Mt Gutierres LPN  Outcome: Progressing  Flowsheets (Taken 3/27/2024 2313)  Discharge to home or other facility with appropriate resources:   Identify barriers to discharge with patient and caregiver   Identify discharge learning needs (meds, wound care, etc)     Problem: Safety - Adult  Goal: Free from fall injury  3/27/2024 2313 by Mt Gutierres LPN  Outcome: Progressing  Flowsheets (Taken 3/27/2024 2313)  Free From Fall Injury: Instruct family/caregiver on patient safety     Problem: Pain  Goal: Verbalizes/displays adequate comfort level or baseline comfort level  3/27/2024 2313 by Mt Gutierres LPN  Outcome: Progressing  Flowsheets (Taken 3/27/2024 2313)  Verbalizes/displays adequate comfort level or baseline comfort level:   Encourage patient to monitor pain and request assistance   Assess pain using appropriate pain scale   Administer analgesics based on type and severity of pain and evaluate response   Implement non-pharmacological measures as appropriate and evaluate response     Problem: Skin/Tissue Integrity  Goal: Absence of new skin breakdown  Description: 1.  Monitor for areas of redness and/or skin breakdown  2.  Assess vascular access sites hourly  3.  Every 4-6 hours minimum:  Change oxygen saturation probe site  4.  Every 4-6 hours:  If on nasal continuous positive airway pressure, respiratory therapy assess nares and determine need for appliance change or resting period.  Outcome: Progressing     Problem: Cardiovascular - Adult  Goal: Absence of cardiac dysrhythmias or at baseline  3/27/2024 2313 by [place] Gutierres LPN  Outcome: Progressing  Flowsheets (Taken 3/27/2024 2313)  Absence of cardiac dysrhythmias or at baseline: Monitor cardiac rate and rhythm     Problem: Musculoskeletal - Adult  Goal: Return mobility to safest level of function  Outcome:

## 2024-03-28 NOTE — PROGRESS NOTES
[facility]  INPATIENT PHYSICAL THERAPY  [facility] - 8K-15/015-A  Daily Note    Time In: 1330  Time Out: 1500  Timed Code Treatment Minutes: 90 Minutes  Minutes: 90          Date: 3/28/2024  Patient Name: Kirstin Estrada,  Gender:  female        MRN: [id number]  : 1945  (78 y.o.)     Referring Practitioner: Beth Crespo DO  Diagnosis: History of Total knee arthroplasty, left  Additional Pertinent Hx: Kirstin Estrada is a 78 y.o. female with PMH significant for hypertension, hyperlipidemia, and hyponatremia who was admitted to [facility] on 3/11/2024.  Patient initially presented to [facility] ED on 3/11/2024 from [facility] for evaluation of new onset A-fib with RVR and hypotension during procedure.  Per report, patient underwent a left total knee replacement by Dr. Salazar at [facility] on 3/11/2024.  Patient reportedly tolerated the procedure well, however, after the patient procedure she was noted to have new onset of A-fib with RVR along with hypotension.  Patient was given beta-blocker and IV fluids and transferred to [facility] for further evaluation management.  On arrival to ED, patient's heart rate noted to be in the 130s.   On admission Ortho and cardiology were consulted.  Per Ortho, patient is WBAT to left lower extremity.   Cardiology evaluated patient.  Patient noted to have an elevated BNP which was thought to likely be related to A-fib.  Recommending a 30-day event monitor to determine burden and need for chronic OAC.     During acute care stay, patient was in and out of A-fib with RVR.  On multiple occasions, plan was to pursue cardioversion, however, patient will then return to normal sinus rhythm.  Patient was started on Eliquis 5 mg twice daily.  Additionally, beta-blocker was increased.  Patient was transitioned from IV amiodarone to amiodarone 200 mg daily.  Patient will need close outpatient follow-up with cardiology.  Pt with rapid response on evening of  independence with functional mobility.  *Only Left Lower Extremity completed supine exercises     Functional Outcome Measures:   Completed.     Left Knee Flexion AROM: 93 degrees (supine)   Left Knee Flexion AAROM with strap: 97 degrees (supine)   Left Knee Extension ROM: lacking 5 degrees (supine)     Modified [place] Scale:  Not Applicable     ASSESSMENT:  Assessment: Patient progressing toward established goals.  Activity Tolerance:  Patient tolerance of  treatment: good.     Equipment Recommendations:Equipment Needed: Yes (Ordered RW)  Discharge Recommendations: Continue to assess pending progress, Therapy recommended at discharge Home with Outpatient PT    PLAN: Current Treatment Recommendations: Strengthening, Home exercise program, Functional mobility training, Stair training, Gait training, Therapeutic activities, Safety education & training, Transfer training, Patient/Caregiver education & training, Equipment evaluation, education, & procurement, Balance training, Endurance training, Neuromuscular re-education  General Plan:  (5x/wk 90 min)    Patient Education  Patient Education: Plan of Care, Functional Mobility, Reviewed Prior Education, Health Promotion and Wellness Education, Safety, Verbal Exercise Instruction, Bed Mobility, Equipment Education, Transfers, Gait, Stairs, Use of Gait Belt, Car Transfers, Home Safety Education, Activity Pacing, Postural Awareness, Rehab TKA outcomes,  - Patient Verbalized Understanding    Goals:  Patient Goals : None stated  Short Term Goals  Time Frame for Short Term Goals: 1 week  Short Term Goal 1: Patient to perform supine<>sit with SBA in order to assist with getting into and out of bed.  Short Term Goal 2: Patient to perform sit<>stand transfers with use of RW and Supervision in order to assist with safety with transfers in home.  Short Term Goal 3: Patient to ambulate >/=30 feet with use of RW and SBA in order to assist with home mobility.  Short Term Goal 4:

## 2024-03-28 NOTE — PLAN OF CARE
Problem: Discharge Planning  Goal: Discharge to home or other facility with appropriate resources  3/28/2024 1405 by Coty Iqbal LISW  Note:   Preparedness Assessment for the Transition Home (PATH-25) Instrument   (Annabelle Kyle & Aisha Daly, 2018)    1. How much do you understand about the patient's expected recovery over the next 6 months? I have some understanding about the patient's expected recovery over the next 6 months. (3)   2. How much do you understand about how the patient's injury/illness will affect your lives over the next 6 months? I understand some about how the injury/illness will affect our lives over the next 6 months. (3)    3. How much do you understand about what you need to do to get things ready before the patient goes home? I understand a lot about what I need to do to get ready before the patient goes home. (4)   4. How much do you understand about what assistance the patient will need with personal care (such as bathing, using the toilet, dressing, and moving around) when he/she goes home? I understand some about what assistance the patient will need with personal care when he/she goes home. (3)    5. How much experience have you had providing physical help with personal care (such as bathing, using the toilet, dressing and moving around) for someone who has an injury/illness or other disability? I have less than one-month experience providing physical help with personal care for someone who has an injury/illness or other disability. (2)    6. How prepared are you to provide the patient assistance with personal care (such as bathing, using the toilet, dressing and moving around) when he/she goes home? I am somewhat prepared to provide the patient assistance with personal care when he/she goes home. (3)   7. How willing are you to provide personal care (such as bathing, using the toilet, dressing and moving around) for the patient when he/she goes home? I am willing to  some other people who will be able to help with my other responsibilities. (3)    16. How much experience do you have helping someone else with daily activities like shopping, errands, taking to appointments, medications, banking, etc.? I have at least one month but less than a year experience helping someone else with daily activities. (3)    17. How willing are you to help the patient with daily activities such as shopping, errands, taking to appointments, medications, banking, etc.? I am willing to help the patient with a lot of daily activities such as shopping, errands, taking to appointments, medications, etc. (4)   18. How concerned are you about your ability to continue providing care for the patient next year? I am a little concerned about my ability to continue providing care for the patient for the next year. (3)   19. Is there enough money available to pay for things not paid by insurance, Social Security income, Workers compensation, In Home Support Services, or other benefits (for example medications, someone to help with personal care, medical equipment, shower chair, co-pays)? We have enough money to pay for things not covered by insurance or other benefits. (4)   20. Will there be any accessibility problems for the patient getting around in the house or using the toilet or shower (for example, the width of doorways, stairs, ramp access) in the home where he/she will be living? There will not be any accessibility problems for the patient in the home. (4)   21. Will you need to make any changes to the home (e.g. ramp, widen doors) to make it accessible? No changes need to be made to make the home accessible. (4)   22. Is there enough money available to pay for the necessary changes to the home to make it accessible? We have enough money to pay for the necessary changes to the home to make it accessible. (4)   23. Will the patient have accessible transportation (e.g. care that he/she can get in and out

## 2024-03-28 NOTE — PROGRESS NOTES
[facility]  Diagnosis List for Inpatient Rehab facility (IRF) - Patient Assessment Instrument (BALA)    Patient Name: Kirstin Estrada        MRN: [id number]    : 1945  (78 y.o.)  Gender: female     Primary impairment requiring rehabilitation: 8.61 Status Post Unilateral Knee Replacement     Etiologic Diagnosis that led to the condition: Left knee osteoarthritis    Comorbid conditions affecting rehabilitation:  Left TKA  New onset A-fib with RVR  Essential hypertension  Hyponatremia  Elevated liver enzymes  Impaired mobilit    YRIS VELASCO MD

## 2024-03-28 NOTE — PROGRESS NOTES
[facility]  Occupational Therapy  Daily Note  Time:   Time In: 0930  Time Out: 1100  Timed Code Treatment Minutes: 90 Minutes  Minutes: 90          Date: 3/28/2024  Patient Name: Kirstin Estrada,   Gender: female      Room: [facility]15/015-A  MRN: [id number]  : 1945  (78 y.o.)  Referring Practitioner: Dr KATIE Crespo  Diagnosis: History of total knee arthroplasty  Additional Pertinent Hx: Kirstin Estrada is a 78 y.o. female with PMH significant for hypertension, hyperlipidemia, and hyponatremia who was admitted to [facility] on 3/11/2024.  Patient initially presented to [facility] ED on 3/11/2024 from [facility] for evaluation of new onset A-fib with RVR and hypotension during procedure.  Per report, patient underwent a left total knee replacement by Dr. Salazar at [facility] on 3/11/2024.  Patient reportedly tolerated the procedure well, however, after the patient procedure she was noted to have new onset of A-fib with RVR along with hypotension.  Patient was given beta-blocker and IV fluids and transferred to [facility] for further evaluation management.  On arrival to ED, patient's heart rate noted to be in the 130s.   On admission Ortho and cardiology were consulted.  Per Ortho, patient is WBAT to left lower extremity.   Cardiology evaluated patient.  Patient noted to have an elevated BNP which was thought to likely be related to A-fib.  Recommending a 30-day event monitor to determine burden and need for chronic OAC.     During acute care stay, patient was in and out of A-fib with RVR.  On multiple occasions, plan was to pursue cardioversion, however, patient will then return to normal sinus rhythm.  Patient was started on Eliquis 5 mg twice daily.  Additionally, beta-blocker was increased.  Patient was transitioned from IV amiodarone to amiodarone 200 mg daily.  Patient will need close outpatient follow-up with cardiology.  Pt with rapid response on evening of 3/19 and  completed IADL task of putting groceries away within apartment. Pt provided with groceries in bags placed on table. Pt required to retrieve groceries and put away in cupboards at various heights. Pt demoed good safety awareness, retrieving multiple bags and transporting to location in which she was able to put away. Pt demoing good use of ECT throughout with good balance. Pt able to reach outside of [place] with good balance. Pt completed with Mod I and increased time.   Community Re-Integration: Pt completed community re-entry task of folding walker, placing into backseat of car, and getting in  seat as she will when she is about to go out and run errands after she returns to driving. Pt educated on safety and technique when completing short mobility from backseat to front. Pt demoing good balance and safety awareness throughout. Completed task with Mod I.     BALANCE:  Sitting Balance:  Independent.    Standing Balance: Modified Independent.      BED MOBILITY:  Not Tested    TRANSFERS:  Sit to Stand:  Modified Independent.    Stand to Sit: Modified Independent.      FUNCTIONAL MOBILITY:  Assistive Device: Rolling Walker  Assist Level:  Modified Independent.   Distance: To and from bathroom and within apartment  Fair pace, no LOB, able to ascend/descend 1 step with no difficulty.      ADDITIONAL ACTIVITIES:  Patient completed BUE strengthening exercises with skilled education on HEP: completed x15 reps x1 set with a mod resistance band in all joints and all planes in order to improve UE strength and activity tolerance required for BADL routine and toilet / shower transfers. Patient tolerated well, requiring min rest breaks. Patient also required no cues for technique.          Modified Titus Scale:  Not Applicable    ASSESSMENT:     Activity Tolerance:  Patient tolerance of  treatment: Good treatment tolerance       Discharge Recommendations: Home with assist as needed and OP PT  Equipment Recommendations:

## 2024-03-28 NOTE — PLAN OF CARE
Problem: Discharge Planning  Goal: Discharge to home or other facility with appropriate resources  3/28/2024 1209 by Coty Iqbal LISW  Note: Team conference held Thursday, 3/28. Recommendations of the team were explained to the patient by Dr Dempsey and FERNY. Team is recommending that patient continue on acute inpatient rehab for PT and OT, with expected discharge date of Friday, 3/29. Following discharge, team is recommending Outpatient PT. Patient prefers [facility] in [place], [place] Care plan reviewed with patient. Patient verbalized understanding of the plan of care and contributed to goal setting.     FERNY spoke with Max at [facility]. Referral made and appointment scheduled for Tuesday, 4/9 at 8:30 AM.    SW to follow and maintain involvement in discharge planning.

## 2024-03-28 NOTE — PROGRESS NOTES
Follow up visit for prayer and encouragement    03/28/24 1532   Encounter Summary   Service Provided For: Patient   [facility]   Support System Children   Last Encounter  03/28/24   Complexity of Encounter Low   Crisis   Type Follow up   Assessment/Intervention/Outcome   Assessment Hopeful   Intervention Empowerment

## 2024-03-28 NOTE — PLAN OF CARE
Problem: Discharge Planning  Goal: Discharge to home or other facility with appropriate resources  3/28/2024 1519 by Coty Iqbal LISW  Note: Transportation:   Has transportation kept you from medical appointments, meetings, work, or from getting things needed for daily living? (Check all that apply)  No.      Health Literacy:   How often do you need to have someone help you when you read instructions, pamphlets, or other written material from your doctor or pharmacy?  0. - Never    Social Isolation:  How often do you feel lonely or isolated from those around you?  0. Never      Patient Mood Interview (PHQ-2 to 9) (from [employer]©)   Say to Patient: \"Over the last 2 weeks, have you been bothered by any of the following problems?\"   If symptom is present, enter 1 (yes) in column 1 (Symptom Presence)  If yes in column 1, then ask the patient: “About how often have you been bothered by this?”  Read and show the patient a card with the symptom frequency choices.    Indicate response in column 2, Symptom Frequency.   Symptom Presence  No (enter 0 in column 2)   Yes (enter 0-3 in column 2)  9.    No response (leave column 2 blank) Symptom Frequency  Never or 1 day  2-6 days (several days)  7-11 days (half or more of the days)  12-14 days (nearly every day    Symptom Presence Symptom Frequency   Little interest or pleasure in doing things 0. No 0. Never or 1 day   Feeling down, depressed, or hopeless 1. Yes 1. 2-6 days (several days)

## 2024-03-28 NOTE — PROGRESS NOTES
[facility]  Recreational Therapy  Discharge Note  Inpatient Rehabilitation Unit         Date:  3/28/2024       Patient Name: Kirstin Estrada      MRN: [id number]       YOB: 1945 (78 y.o.)       Gender: female  Diagnosis: History of total knee arthroplasty  Referring Practitioner: Dr KATIE Crespo    Patient discharged from Recreational Therapy at this time.  See recreational therapy notes for details.    Electronically signed by: Elis Gonzalez, CTRS  Date: 3/28/2024

## 2024-03-29 LAB
ANION GAP SERPL CALC-SCNC: 10 MEQ/L (ref 8–16)
BUN SERPL-MCNC: 12 MG/DL (ref 7–22)
CALCIUM SERPL-MCNC: 9 MG/DL (ref 8.5–10.5)
CHLORIDE SERPL-SCNC: 98 MEQ/L (ref 98–111)
CO2 SERPL-SCNC: 26 MEQ/L (ref 23–33)
CREAT SERPL-MCNC: 0.7 MG/DL (ref 0.4–1.2)
EKG ATRIAL RATE: 84 BPM
EKG P AXIS: 83 DEGREES
EKG P-R INTERVAL: 140 MS
EKG Q-T INTERVAL: 300 MS
EKG Q-T INTERVAL: 370 MS
EKG QRS DURATION: 66 MS
EKG QRS DURATION: 70 MS
EKG QTC CALCULATION (BAZETT): 437 MS
EKG QTC CALCULATION (BAZETT): 451 MS
EKG R AXIS: 60 DEGREES
EKG R AXIS: 78 DEGREES
EKG T AXIS: 69 DEGREES
EKG T AXIS: 8 DEGREES
EKG VENTRICULAR RATE: 136 BPM
EKG VENTRICULAR RATE: 84 BPM
GFR SERPL CREATININE-BSD FRML MDRD: 88 ML/MIN/1.73M2
GLUCOSE SERPL-MCNC: 96 MG/DL (ref 70–108)
MAGNESIUM SERPL-MCNC: 1.9 MG/DL (ref 1.6–2.4)
POTASSIUM SERPL-SCNC: 5.2 MEQ/L (ref 3.5–5.2)
SODIUM SERPL-SCNC: 134 MEQ/L (ref 135–145)

## 2024-03-29 PROCEDURE — 97530 THERAPEUTIC ACTIVITIES: CPT

## 2024-03-29 PROCEDURE — 83735 ASSAY OF MAGNESIUM: CPT

## 2024-03-29 PROCEDURE — 6370000000 HC RX 637 (ALT 250 FOR IP): Performed by: STUDENT IN AN ORGANIZED HEALTH CARE EDUCATION/TRAINING PROGRAM

## 2024-03-29 PROCEDURE — 97116 GAIT TRAINING THERAPY: CPT

## 2024-03-29 PROCEDURE — 93010 ELECTROCARDIOGRAM REPORT: CPT | Performed by: INTERNAL MEDICINE

## 2024-03-29 PROCEDURE — 6370000000 HC RX 637 (ALT 250 FOR IP): Performed by: REGISTERED NURSE

## 2024-03-29 PROCEDURE — 36415 COLL VENOUS BLD VENIPUNCTURE: CPT

## 2024-03-29 PROCEDURE — 80048 BASIC METABOLIC PNL TOTAL CA: CPT

## 2024-03-29 PROCEDURE — 99232 SBSQ HOSP IP/OBS MODERATE 35: CPT | Performed by: PHYSICIAN ASSISTANT

## 2024-03-29 PROCEDURE — 99239 HOSP IP/OBS DSCHRG MGMT >30: CPT | Performed by: NURSE PRACTITIONER

## 2024-03-29 PROCEDURE — 6370000000 HC RX 637 (ALT 250 FOR IP): Performed by: PHYSICIAN ASSISTANT

## 2024-03-29 PROCEDURE — 1180000000 HC REHAB R&B

## 2024-03-29 PROCEDURE — 93005 ELECTROCARDIOGRAM TRACING: CPT | Performed by: NURSE PRACTITIONER

## 2024-03-29 PROCEDURE — 93005 ELECTROCARDIOGRAM TRACING: CPT | Performed by: PHYSICAL MEDICINE & REHABILITATION

## 2024-03-29 PROCEDURE — 97110 THERAPEUTIC EXERCISES: CPT

## 2024-03-29 RX ORDER — METOPROLOL TARTRATE 50 MG/1
50 TABLET, FILM COATED ORAL 2 TIMES DAILY
Status: DISCONTINUED | OUTPATIENT
Start: 2024-03-29 | End: 2024-03-30 | Stop reason: HOSPADM

## 2024-03-29 RX ORDER — METOPROLOL TARTRATE 50 MG/1
50 TABLET, FILM COATED ORAL 2 TIMES DAILY
Qty: 60 TABLET | Refills: 3 | Status: SHIPPED | OUTPATIENT
Start: 2024-03-29

## 2024-03-29 RX ADMIN — METOPROLOL TARTRATE 25 MG: 25 TABLET, FILM COATED ORAL at 08:34

## 2024-03-29 RX ADMIN — ACETAMINOPHEN 1000 MG: 500 TABLET ORAL at 20:38

## 2024-03-29 RX ADMIN — APIXABAN 5 MG: 5 TABLET, FILM COATED ORAL at 20:37

## 2024-03-29 RX ADMIN — Medication 1 TABLET: at 08:34

## 2024-03-29 RX ADMIN — METOPROLOL TARTRATE 50 MG: 50 TABLET, FILM COATED ORAL at 20:37

## 2024-03-29 RX ADMIN — APIXABAN 5 MG: 5 TABLET, FILM COATED ORAL at 08:34

## 2024-03-29 RX ADMIN — METOPROLOL TARTRATE 25 MG: 25 TABLET, FILM COATED ORAL at 13:48

## 2024-03-29 RX ADMIN — DICLOFENAC SODIUM 4 G: 10 GEL TOPICAL at 20:37

## 2024-03-29 RX ADMIN — AMIODARONE HYDROCHLORIDE 200 MG: 200 TABLET ORAL at 08:34

## 2024-03-29 ASSESSMENT — PAIN DESCRIPTION - LOCATION: LOCATION: BACK

## 2024-03-29 ASSESSMENT — PAIN SCALES - WONG BAKER
WONGBAKER_NUMERICALRESPONSE: NO HURT

## 2024-03-29 ASSESSMENT — PAIN SCALES - GENERAL
PAINLEVEL_OUTOF10: 4
PAINLEVEL_OUTOF10: 4

## 2024-03-29 ASSESSMENT — PAIN DESCRIPTION - DESCRIPTORS: DESCRIPTORS: ACHING

## 2024-03-29 NOTE — PROGRESS NOTES
When nurse entered room to assess patient this am iván stated she has been in a-fib all night. This nurse obtained vitals which can be seen in flow sheet however pulse rate fluctuated from  apically. Iván denies chest pain, naused, or diaphoreses.

## 2024-03-29 NOTE — PLAN OF CARE
Problem: Discharge Planning  Goal: Discharge to home or other facility with appropriate resources  3/29/2024 1401 by Carin Suresh RN  Outcome: Progressing  Flowsheets (Taken 3/29/2024 1401)  Discharge to home or other facility with appropriate resources:   Identify barriers to discharge with patient and caregiver   Arrange for needed discharge resources and transportation as appropriate   Identify discharge learning needs (meds, wound care, etc)   Refer to discharge planning if patient needs post-hospital services based on physician order or complex needs related to functional status, cognitive ability or social support system  Note: Patients discharge was canceled for today and planned for tomorrow if stays in [facility].  3/29/2024 0846 by Coty Iqbal LISW  Note: Patient to be discharged on Friday, 3/29 to home. Patient will be under the supervision of family. Family education was not provided. Patient will be receiving services through [employer] [facility] [employer]. SW provided information to [place] on 3/28 via fax.       Problem: Safety - Adult  Goal: Free from fall injury  Outcome: Progressing  Flowsheets (Taken 3/29/2024 1401)  Free From Fall Injury:   Instruct family/caregiver on patient safety   Based on caregiver fall risk screen, instruct family/caregiver to ask for assistance with transferring infant if caregiver noted to have fall risk factors  Note: Patient remains free from falls at this time.     Problem: ABCDS Injury Assessment  Goal: Absence of physical injury  Outcome: Progressing  Flowsheets (Taken 3/29/2024 1401)  Absence of Physical Injury: Implement safety measures based on patient assessment     Problem: Pain  Goal: Verbalizes/displays adequate comfort level or baseline comfort level  Outcome: Progressing  Flowsheets (Taken 3/29/2024 1401)  Verbalizes/displays adequate comfort level or baseline comfort level:   Encourage patient to monitor pain and request assistance   Assess pain using appropriate

## 2024-03-29 NOTE — PROGRESS NOTES
[facility]  OCCUPATIONAL THERAPY MISSED TREATMENT NOTE  [facility]  8K-15/015-A      Date: 3/29/2024  Patient Name: Kirstin Estrada        CSN: [id number]   : 1945  (78 y.o.)  Gender: female   Referring Practitioner: Dr KATIE Crespo  Diagnosis: History of total knee arthroplasty         REASON FOR MISSED TREATMENT: Upon first attempt to see pt this date at 7AM, pt tachycardic and reporting she was in & out of A-Fib all night and was not feeling well. Per discussion with pt, OT held awaiting for input from MD & NP on any changes in POC. Upon 2nd attempt, pt on med hold, awaiting to see cardiology. Pt off of med hold this afternoon; however, working with PT. Total Missed OT this date: -90min. NP aware of missed treatment.

## 2024-03-29 NOTE — PROGRESS NOTES
Patient: Kirstin Estrada  Unit/Bed: 8K-15/015-A  YOB: 1945  MRN: [id number] Acct: [id number]   Admitting Diagnosis: History of total knee arthroplasty, left [Z96.652]  Admit Date:  3/19/2024  Hospital Day: 9    Assessment:     Principal Problem:    History of total knee arthroplasty, left  Resolved Problems:    * No resolved hospital problems. *      Plan:     Medically stable for discharge tomorrow         Subjective:     Patient has no complaint of CP or SOB..   Medication side effects: none    Scheduled Meds:   amiodarone  200 mg Oral Daily    docusate sodium  100 mg Oral BID    diclofenac sodium  4 g Topical BID    metoprolol tartrate  25 mg Oral BID    multivitamin  1 tablet Oral Daily    apixaban  5 mg Oral BID    [Held by provider] magnesium oxide  400 mg Oral Daily     Continuous Infusions:   sodium chloride       PRN Meds:sodium chloride flush, sodium chloride, polyethylene glycol, potassium chloride **OR** potassium alternative oral replacement **OR** potassium chloride, magnesium sulfate, melatonin, traMADol **OR** traMADol, acetaminophen, ondansetron    Review of Systems  Pertinent items are noted in HPI.    Objective:     Patient Vitals for the past 8 hrs:   BP Temp Temp src Pulse Resp SpO2   03/28/24 1940 (!) 136/55 98.1 °F (36.7 °C) Oral 93 16 99 %     I/O last 3 completed shifts:  In: 1840 [P.O.:1840]  Out: -   No intake/output data recorded.    BP (!) 136/55   Pulse 93   Temp 98.1 °F (36.7 °C) (Oral)   Resp 16   Ht 1.6 m (5' 3\")   Wt 61.3 kg (135 lb 2.3 oz)   SpO2 99%   BMI 23.94 kg/m²     General appearance: alert, appears stated age, and cooperative  Head: Normocephalic, without obvious abnormality, atraumatic  Lungs: clear to auscultation bilaterally  Chest wall: no tenderness  Heart: regular rate and rhythm, S1, S2 normal, no murmur, click, rub or gallop  Abdomen: soft, non-tender; bowel sounds normal; no masses,  no organomegaly  Extremities: extremities normal,

## 2024-03-29 NOTE — PLAN OF CARE
Problem: Discharge Planning  Goal: Discharge to home or other facility with appropriate resources  3/29/2024 0846 by Coty Iqbal LISW  Note: Patient to be discharged on Friday, 3/29 to home. Patient will be under the supervision of family. Family education was not provided. Patient will be receiving services through [employer] [facility] [employer]. SW provided information to [place] on 3/28 via fax.

## 2024-03-29 NOTE — PROGRESS NOTES
Physical Medicine & Rehabilitation   Progress Note    Chief Complaint:  Debility 2/2 TKA and new onset a-fib       Subjective:  Patient seen today, resting in bed. Patient reports that she was in AFib starting around 2330 last evening. Patient feels exhausted and with some nausea. EKG obtained this AM, AFIB rate 130s. BP is stable 122/73. RN notified cardiology. Patient with irregular rhythm mostly 100-120, occasionally down to 80s and up to 150s. Patient denies any chest pain, diaphoresis, or impending feelings. Patient states just exhausted at this time. Therapy on hold discharge on hold. Await input from cardiology.     RN heard back for MISAEL Bermudez, who is off today but will reach out to provider on call. No need for RR as long as BP remains stable.  Continue to follow       Rehabilitation:   PT 03/27/2024:  Bed Mobility:  Supine to Sit: Modified Independent  Sit to Supine: Modified Independent   Scooting: Modified Independent  Transfers:  Sit to Stand: Supervision  Stand to Sit:Supervision  Car:Supervision  Ambulation:  Supervision  Distance: 300'x2, 150x1, multiple distances throughout rehab gym  Surface: Level Tile  Device:Rolling Walker  Gait Deviations:  Forward Flexed Posture, Slow Cammie, Decreased Step Length on Right, Decreased Weight Shift Left, Decreased Gait Speed, Decreased Heel Strike Bilaterally, Wide Base of Support, and Increased Reliance on Rolling Walker  Stairs:  Contact Guard Assistance  Number of Steps: 1 x 5 reps  Height: 4\" step with Bilateral Handrails  Balance:  Static Sitting Balance:  Supervision  Dynamic Sitting Balance: Supervision  Static Standing Balance: Supervision  Dynamic Standing Balance: Supervision  Exercise:  Patient was guided in 1 set(s) 10 reps of exercise to left lower extremities.  Ankle pumps, Glut sets, Quad sets, Heelslides, Short arc quads, Hip abduction/adduction, and Straight leg raises.  Exercises were completed for increased independence with functional  infection/retention  Bowel: As needed M[employer]   and case management consultations for coordination of care and discharge planning. Team conference held Thursday with anticipated DC 03/29/2024 with outpatient PT. [facility] prefers [facility]      DISCHARGE ON HOLD AT THIS TIME        Missed Therapy Time:  90 minutes of therapy missed 3/29/24 due to medical hold    Maryan Wilkes, APRN - CNP

## 2024-03-29 NOTE — CARE COORDINATION
Patient called out at this time stating that she is back in afib. Patient stated that she pushed the button on her heart monitor as instructed.  Denied any chest pain or SOB. /75  at this time.

## 2024-03-29 NOTE — PROGRESS NOTES
[facility]  INPATIENT PHYSICAL THERAPY  DAILY NOTE  [facility] - 8K-15/015-A    Time In: 1330  Time Out: 1500  Timed Code Treatment Minutes: 90 Minutes  Minutes: 90          Date: 3/29/2024  Patient Name: Kirstin Estrada,  Gender:  female        MRN: [id number]  : 1945  (78 y.o.)     Referring Practitioner: Beth Crespo DO  Diagnosis: History of Total knee arthroplasty, left  Additional Pertinent Hx: Kirstin Estrada is a 78 y.o. female with PMH significant for hypertension, hyperlipidemia, and hyponatremia who was admitted to [facility] on 3/11/2024.  Patient initially presented to [facility] ED on 3/11/2024 from [facility] for evaluation of new onset A-fib with RVR and hypotension during procedure.  Per report, patient underwent a left total knee replacement by Dr. Salazar at [facility] on 3/11/2024.  Patient reportedly tolerated the procedure well, however, after the patient procedure she was noted to have new onset of A-fib with RVR along with hypotension.  Patient was given beta-blocker and IV fluids and transferred to [facility] for further evaluation management.  On arrival to ED, patient's heart rate noted to be in the 130s.   On admission Ortho and cardiology were consulted.  Per Ortho, patient is WBAT to left lower extremity.   Cardiology evaluated patient.  Patient noted to have an elevated BNP which was thought to likely be related to A-fib.  Recommending a 30-day event monitor to determine burden and need for chronic OAC.     During acute care stay, patient was in and out of A-fib with RVR.  On multiple occasions, plan was to pursue cardioversion, however, patient will then return to normal sinus rhythm.  Patient was started on Eliquis 5 mg twice daily.  Additionally, beta-blocker was increased.  Patient was transitioned from IV amiodarone to amiodarone 200 mg daily.  Patient will need close outpatient follow-up with cardiology.  Pt with rapid response on evening of  3/19 and found to be in Afib with RVR . Medications adjusted and patient approved to continue rehab.     Prior Level of Function:  Lives With: Alone  Type of Home: House  Home Layout: One level, Work area in basement, Able to Live on Main level with bedroom/bathroom  Home Access: Stairs to enter without rails  Entrance Stairs - Number of Steps: 1  Home Equipment: Cane, Walker, standard, Reacher, Sock aid   Bathroom Shower/Tub: Walk-in shower  Bathroom Toilet: Standard  Bathroom Accessibility: Walker accessible    Receives Help From: Family  ADL Assistance: Independent  Homemaking Assistance: Independent  Ambulation Assistance: Independent  Transfer Assistance: Independent  Active : Yes  Additional Comments: Pt fully I prior to admission; son is close by to assist and daughter lives near [place].    Restrictions/Precautions:  Restrictions/Precautions: Weight Bearing, Fall Risk  Left Lower Extremity Weight Bearing: Weight Bearing As Tolerated  Position Activity Restriction  Other position/activity restrictions: Monitor HR     SUBJECTIVE: Pt in recliner upon arrival, and agrees to therapy, RN approved session, Pt A&O, Pt pleasant and cooperative- had been on medical hold this am due to going into afib-- per RN pt has converted back to sinus rhythm and is not discharging today. Pt denies cardiac symptoms during session    PAIN: 3/10: knee, RN gave Tylenol during session    Vitals: Nurse checked vitals prior to session  Vitals:    03/29/24 1341   BP: (!) 127/53   Pulse: 95   Resp:    Temp:    SpO2: 99%     OBJECTIVE:  Bed Mobility:  Supine to Sit: Modified Independent  Sit to Supine: Modified Independent   Scooting: Modified Independent    Transfers:  Sit to Stand: Modified Independent  Stand to Sit:Modified Independent  Car:Modified Independent    Ambulation:  Modified Independent  Distance: 15ft, [id number], 200ft, 250ft  Surface: Level Tile  Device:Rolling Walker  Gait Deviations:  Slow Cammie, Decreased Step

## 2024-03-29 NOTE — PROGRESS NOTES
Patient: Kirstin Estrada  Unit/Bed: 8K-15/015-A  YOB: 1945  MRN: [id number] Acct: [id number]   Admitting Diagnosis: History of total knee arthroplasty, left [Z96.652]  Admit Date:  3/19/2024  Hospital Day: 10    Assessment:     Principal Problem:    History of total knee arthroplasty, left  Resolved Problems:    * No resolved hospital problems. *      Plan:     We discussed the events of overnight and how cardiology has come by.         Subjective:     Patient has no complaint of CP or SOB but feels fatigued from not sleeping well last night..   Medication side effects: none    Scheduled Meds:   metoprolol tartrate  50 mg Oral BID    amiodarone  200 mg Oral Daily    docusate sodium  100 mg Oral BID    diclofenac sodium  4 g Topical BID    multivitamin  1 tablet Oral Daily    apixaban  5 mg Oral BID    [Held by provider] magnesium oxide  400 mg Oral Daily     Continuous Infusions:   sodium chloride       PRN Meds:sodium chloride flush, sodium chloride, polyethylene glycol, potassium chloride **OR** potassium alternative oral replacement **OR** potassium chloride, magnesium sulfate, melatonin, traMADol **OR** traMADol, acetaminophen, ondansetron    Review of Systems  Pertinent items are noted in HPI.    Objective:     Patient Vitals for the past 8 hrs:   BP Temp src Pulse Resp SpO2   03/29/24 1641 124/63 -- 80 19 100 %   03/29/24 1408 -- Oral -- -- --   03/29/24 1341 (!) 127/53 -- 95 -- 99 %   03/29/24 1241 -- -- 84 -- --   03/29/24 0958 113/64 -- 92 -- 100 %   03/29/24 0905 115/66 -- (!) 115 18 --   03/29/24 0853 121/73 -- (!) 105 -- --     I/O last 3 completed shifts:  In: 940 [P.O.:940]  Out: -   I/O this shift:  In: 200 [P.O.:200]  Out: -     /63   Pulse 80   Temp 97.9 °F (36.6 °C)   Resp 19   Ht 1.6 m (5' 3\")   Wt 60.9 kg (134 lb 4.2 oz)   SpO2 100%   BMI 23.78 kg/m²     General appearance: alert, appears stated age, and cooperative  Head: Normocephalic, without obvious abnormality,

## 2024-03-29 NOTE — DISCHARGE INSTR - DIET

## 2024-03-29 NOTE — PROGRESS NOTES
10 am Patient heart rate regular and confirmed with an EKG. Radha PARK notified.     Cardiology also seen patient today and adjusted daily Lopressor dose and a 1 time 25 mg of lopressor given.    Patient has remained stable and no s/s of a-fib RVR since converting at 10 am.   Patient staying over night to have heart rate monitored.

## 2024-03-29 NOTE — PROGRESS NOTES
Cardiology Progress Note      Patient:  Kirstin Estrada  YOB: 1945  MRN: [id number]   Acct: [id number]  Admit Date:  3/19/2024  Primary Cardiologist:  none    Called back to see pt for afib rvr    Subjective (Events in last 24 hours): pt awake and alert.  NAD. No cp or sob. No lightheadedness or syncope. Some leg edema post knee replacement.  No orthopnea  Pt noticed she went back to afib rvr last night.  She was given her morning meds.  HR as high as 140, now back to 80s.  She is just tired from not sleeping well last night      Objective:   /64   Pulse 84   Temp 97.9 °F (36.6 °C)   Resp 18   Ht 1.6 m (5' 3\")   Wt 60.9 kg (134 lb 4.2 oz)   SpO2 100%   BMI 23.78 kg/m²        TELEMETRY: no tele on 8k    Physical Exam:  General Appearance: alert and oriented to person, place and time, in no acute distress  Cardiovascular: normal rate, regular rhythm, normal S1 and S2, no murmurs, rubs, clicks, or gallops, distal pulses intact, no carotid bruits, no JVD  Pulmonary/Chest: clear to auscultation bilaterally- no wheezes, rales or rhonchi, normal air movement, no respiratory distress  Abdomen: soft, non-tender, non-distended, normal bowel sounds, no masses Extremities: no cyanosis, clubbing or edema, pulse   Skin: warm and dry, no rash or erythema  Head: normocephalic and atraumatic  Eyes: pupils equal, round, and reactive to light  Neck: supple and non-tender without mass, no thyromegaly   Musculoskeletal: normal range of motion, no joint swelling, deformity or tenderness  Neurological: alert, oriented, normal speech, no focal findings or movement disorder noted    Medications:    amiodarone  200 mg Oral Daily    docusate sodium  100 mg Oral BID    diclofenac sodium  4 g Topical BID    metoprolol tartrate  25 mg Oral BID    multivitamin  1 tablet Oral Daily    apixaban  5 mg Oral BID    [Held by provider] magnesium oxide  400 mg Oral Daily      sodium chloride       sodium chloride flush, 5-40

## 2024-03-30 VITALS
WEIGHT: 135.36 LBS | HEART RATE: 95 BPM | OXYGEN SATURATION: 97 % | BODY MASS INDEX: 23.98 KG/M2 | DIASTOLIC BLOOD PRESSURE: 60 MMHG | TEMPERATURE: 98.1 F | SYSTOLIC BLOOD PRESSURE: 103 MMHG | HEIGHT: 63 IN | RESPIRATION RATE: 18 BRPM

## 2024-03-30 PROCEDURE — 6370000000 HC RX 637 (ALT 250 FOR IP): Performed by: REGISTERED NURSE

## 2024-03-30 PROCEDURE — 97530 THERAPEUTIC ACTIVITIES: CPT

## 2024-03-30 PROCEDURE — 6370000000 HC RX 637 (ALT 250 FOR IP): Performed by: STUDENT IN AN ORGANIZED HEALTH CARE EDUCATION/TRAINING PROGRAM

## 2024-03-30 PROCEDURE — 6370000000 HC RX 637 (ALT 250 FOR IP): Performed by: PHYSICIAN ASSISTANT

## 2024-03-30 PROCEDURE — 97110 THERAPEUTIC EXERCISES: CPT

## 2024-03-30 PROCEDURE — 97116 GAIT TRAINING THERAPY: CPT

## 2024-03-30 RX ADMIN — AMIODARONE HYDROCHLORIDE 200 MG: 200 TABLET ORAL at 08:59

## 2024-03-30 RX ADMIN — Medication 1 TABLET: at 08:59

## 2024-03-30 RX ADMIN — DICLOFENAC SODIUM 4 G: 10 GEL TOPICAL at 08:59

## 2024-03-30 RX ADMIN — APIXABAN 5 MG: 5 TABLET, FILM COATED ORAL at 08:59

## 2024-03-30 RX ADMIN — METOPROLOL TARTRATE 50 MG: 50 TABLET, FILM COATED ORAL at 08:59

## 2024-03-30 RX ADMIN — ACETAMINOPHEN 1000 MG: 500 TABLET ORAL at 09:00

## 2024-03-30 NOTE — PROGRESS NOTES
[facility]  Inpatient Rehabilitation  Occupational Therapy  Discharge Note  Time:  Time In: 1130  Time Out: 1200  Timed Code Treatment Minutes: 30 Minutes  Minutes: 30          Date: 3/30/2024  Patient Name: Kirstin Estrada,   Gender: female      Room: [facility]15/015-A  MRN: [id number]  : 1945  (78 y.o.)  Referring Practitioner: Dr KATIE Crespo  Diagnosis: History of total knee arthroplasty  Additional Pertinent Hx: Kirstin Estrada is a 78 y.o. female with PMH significant for hypertension, hyperlipidemia, and hyponatremia who was admitted to [facility] on 3/11/2024.  Patient initially presented to [facility] ED on 3/11/2024 from [facility] for evaluation of new onset A-fib with RVR and hypotension during procedure.  Per report, patient underwent a left total knee replacement by Dr. Salazar at [facility] on 3/11/2024.  Patient reportedly tolerated the procedure well, however, after the patient procedure she was noted to have new onset of A-fib with RVR along with hypotension.  Patient was given beta-blocker and IV fluids and transferred to [facility] for further evaluation management.  On arrival to ED, patient's heart rate noted to be in the 130s.   On admission Ortho and cardiology were consulted.  Per Ortho, patient is WBAT to left lower extremity.   Cardiology evaluated patient.  Patient noted to have an elevated BNP which was thought to likely be related to A-fib.  Recommending a 30-day event monitor to determine burden and need for chronic OAC.     During acute care stay, patient was in and out of A-fib with RVR.  On multiple occasions, plan was to pursue cardioversion, however, patient will then return to normal sinus rhythm.  Patient was started on Eliquis 5 mg twice daily.  Additionally, beta-blocker was increased.  Patient was transitioned from IV amiodarone to amiodarone 200 mg daily.  Patient will need close outpatient follow-up with cardiology.  Pt with rapid response on evening of 3/19 and found to be  Goals  Time Frame for Long Term Goals : 2 weeks from OT evaluation  Long Term Goal 1: Pt will compelte BADL routine with modified independence using AE prn to improve indep with self care at home alone.  GOAL MET   Long Term Goal 2: Pt will complete light cooking tasks with modified independence with use of RW to improve indep preparing lunch for self at home.  GOAL MET   Long Term Goal 3: Pt will use RW to complete light IADL tasks with mod I and no vcs for modifications and AE safety to improve indep at home alone.  GOAL MET   Long Term Goal 4: Pt will complete BUE strengthening HEP while following handout with mod I to improve UB strength and endurance for home mgt tasks.  GOAL MET     Following session, patient left in safe position with all fall risk precautions in place.

## 2024-03-30 NOTE — PLAN OF CARE
Problem: Discharge Planning  Goal: Discharge to home or other facility with appropriate resources  3/30/2024 1219 by Nubia Walker RN  Outcome: Adequate for Discharge     Problem: Safety - Adult  Goal: Free from fall injury  3/30/2024 1219 by Nubia Walker RN  Outcome: Adequate for Discharge     Problem: ABCDS Injury Assessment  Goal: Absence of physical injury  3/30/2024 1219 by Nubia Walker RN  Outcome: Adequate for Discharge     Problem: Pain  Goal: Verbalizes/displays adequate comfort level or baseline comfort level  3/30/2024 1219 by Nubia Walker RN  Outcome: Adequate for Discharge     Problem: Skin/Tissue Integrity  Goal: Absence of new skin breakdown  Description: 1.  Monitor for areas of redness and/or skin breakdown  2.  Assess vascular access sites hourly  3.  Every 4-6 hours minimum:  Change oxygen saturation probe site  4.  Every 4-6 hours:  If on nasal continuous positive airway pressure, respiratory therapy assess nares and determine need for appliance change or resting period.  3/30/2024 1219 by Nubia Walker RN  Outcome: Adequate for Discharge     Problem: Cardiovascular - Adult  Goal: Maintains optimal cardiac output and hemodynamic stability  3/30/2024 1219 by Nubia Walker RN  Outcome: Adequate for Discharge     Problem: Musculoskeletal - Adult  Goal: Return mobility to safest level of function  3/30/2024 1219 by Nubia Walker RN  Outcome: Adequate for Discharge     Problem: Infection - Adult  Goal: Absence of infection at discharge  Outcome: Adequate for Discharge     Problem: Infection - Adult  Goal: Absence of infection during hospitalization  3/30/2024 1219 by Nubia Walker RN  Outcome: Adequate for Discharge     Problem: Infection - Adult  Goal: Absence of fever/infection during anticipated neutropenic period  Outcome: Adequate for Discharge     Problem: Metabolic/Fluid and Electrolytes - Adult  Goal: Electrolytes maintained within normal

## 2024-03-30 NOTE — PROGRESS NOTES
Patient discharged in stable condition as per order of attending physician to Home per staff at Time: 1300 to car.    AVS provided by RN at time of discharge, which includes all necessary medical information pertaining to the patients current course of illness, treatment, medications, post-discharge goals of care, and treatment preferences.     Patient/ family verbalize understanding of discharge plan and are in agreement with goal/plan/treatment preferences.     Belongings including Glasses sent with patient.      Home medications sent home with patient yes    Availability of \"My Chart\" offered to patient as a tool for updated health record.  Steps for activation discussed with patient as mentioned on AVS.

## 2024-03-30 NOTE — PROGRESS NOTES
[facility]  INPATIENT PHYSICAL THERAPY  DISCHARGE NOTE  [facility] - 8K-15/015-A    Time In: 0930  Time Out: 1000  Timed Code Treatment Minutes: 30 Minutes  Minutes: 30          Date: 3/30/2024  Patient Name: Kirstin Estrada,  Gender:  female        MRN: [id number]  : 1945  (78 y.o.)     Referring Practitioner: Beth Crespo DO  Diagnosis: History of Total knee arthroplasty, left  Additional Pertinent Hx: Kirstin Estrada is a 78 y.o. female with PMH significant for hypertension, hyperlipidemia, and hyponatremia who was admitted to [facility] on 3/11/2024.  Patient initially presented to [facility] ED on 3/11/2024 from [facility] for evaluation of new onset A-fib with RVR and hypotension during procedure.  Per report, patient underwent a left total knee replacement by Dr. Salazar at [facility] on 3/11/2024.  Patient reportedly tolerated the procedure well, however, after the patient procedure she was noted to have new onset of A-fib with RVR along with hypotension.  Patient was given beta-blocker and IV fluids and transferred to [facility] for further evaluation management.  On arrival to ED, patient's heart rate noted to be in the 130s.   On admission Ortho and cardiology were consulted.  Per Ortho, patient is WBAT to left lower extremity.   Cardiology evaluated patient.  Patient noted to have an elevated BNP which was thought to likely be related to A-fib.  Recommending a 30-day event monitor to determine burden and need for chronic OAC.     During acute care stay, patient was in and out of A-fib with RVR.  On multiple occasions, plan was to pursue cardioversion, however, patient will then return to normal sinus rhythm.  Patient was started on Eliquis 5 mg twice daily.  Additionally, beta-blocker was increased.  Patient was transitioned from IV amiodarone to amiodarone 200 mg daily.  Patient will need close outpatient follow-up with cardiology.  Pt with rapid response on  Goals  Time Frame for Short Term Goals: 1 week  Short Term Goal 1: Patient to perform supine<>sit with SBA in order to assist with getting into and out of bed.- GOAL MET  Short Term Goal 2: Patient to perform sit<>stand transfers with use of RW and Supervision in order to assist with safety with transfers in home.- GOAL MET  Short Term Goal 3: Patient to ambulate >/=30 feet with use of RW and SBA in order to assist with home mobility.-  GOAL MET  Short Term Goal 4: Patient to ascend/descend 1 steps with B handrails and CGA in order to assist with home entry.- GOAL MET  Short Term Goal 5: Patient to demonstrated increased L knee flexion ROM to >/=90 degrees in order to assist with increased ease with transfers and mobility.- GOAL MET  Long Term Goals  Time Frame for Long Term Goals : 2 weeks from IPR evaluation  Long Term Goal 1: Patient to perform supine<>sit with Mod I in order to assist with getting into and out of bed.- GOAL MET  Long Term Goal 2: Patient to perform sit<>stand transfers with use of RW and Mod I in order to assist with safety with transfers in home.-GOAL MET  Long Term Goal 3: Patient to ambulate >/=100 feet with use of RW and Mod I in order to assist with home and community mobility.- GOAL MET  Long Term Goal 4: Patient to perform car transfer with Supervision in order to assist with getting to and from appointments.- GOAL MET  Long Term Goal 5: Patient to score >/=19/28 on the Tinetti in order to reduce risk for falls.- GOAL NOT MET  Additional Goals?: Yes  Long term goal 6: Patient to demonstrated increased L knee flexion ROM to >/=100 degrees in order to assist with functional mobility.- GOAL NOT MET    Following session, patient left in safe position with all fall risk precautions in place.

## 2024-03-30 NOTE — PLAN OF CARE
Problem: Discharge Planning  Goal: Discharge to home or other facility with appropriate resources  3/30/2024 0020 by Ashish Ni RN  Outcome: Progressing  Flowsheets (Taken 3/29/2024 2028)  Discharge to home or other facility with appropriate resources: Identify barriers to discharge with patient and caregiver  3/29/2024 1401 by Carin Suresh RN  Outcome: Progressing  Flowsheets (Taken 3/29/2024 1401)  Discharge to home or other facility with appropriate resources:   Identify barriers to discharge with patient and caregiver   Arrange for needed discharge resources and transportation as appropriate   Identify discharge learning needs (meds, wound care, etc)   Refer to discharge planning if patient needs post-hospital services based on physician order or complex needs related to functional status, cognitive ability or social support system  Note: Patients discharge was canceled for today and planned for tomorrow if stays in NSR.     Problem: Safety - Adult  Goal: Free from fall injury  3/30/2024 0020 by Ashish Ni RN  Outcome: Progressing  Flowsheets (Taken 3/30/2024 0020)  Free From Fall Injury: Instruct family/caregiver on patient safety  3/29/2024 1401 by Carin Suresh RN  Outcome: Progressing  Flowsheets (Taken 3/29/2024 1401)  Free From Fall Injury:   Instruct family/caregiver on patient safety   Based on caregiver fall risk screen, instruct family/caregiver to ask for assistance with transferring infant if caregiver noted to have fall risk factors  Note: Patient remains free from falls at this time.     Problem: ABCDS Injury Assessment  Goal: Absence of physical injury  3/30/2024 0020 by Ashish Ni RN  Outcome: Progressing  Flowsheets (Taken 3/30/2024 0020)  Absence of Physical Injury: Implement safety measures based on patient assessment  3/29/2024 1401 by Carin Suresh RN  Outcome: Progressing  Flowsheets (Taken 3/29/2024 1401)  Absence of Physical Injury: Implement safety  2028)  Electrolytes maintained within normal limits:   Monitor labs and assess patient for signs and symptoms of electrolyte imbalances   Administer electrolyte replacement as ordered  3/29/2024 1401 by Carin Suresh RN  Outcome: Progressing  Flowsheets (Taken 3/29/2024 1401)  Electrolytes maintained within normal limits:   Monitor labs and assess patient for signs and symptoms of electrolyte imbalances   Administer electrolyte replacement as ordered   Monitor response to electrolyte replacements, including repeat lab results as appropriate   Instruct patient on fluid and nutrition restrictions as appropriate   Fluid restriction as ordered

## 2024-04-01 ENCOUNTER — TELEPHONE (OUTPATIENT)
Dept: CARDIOLOGY CLINIC | Age: 79
End: 2024-04-01

## 2024-04-01 DIAGNOSIS — I48.91 ATRIAL FIBRILLATION WITH RAPID VENTRICULAR RESPONSE (HCC): ICD-10-CM

## 2024-04-01 DIAGNOSIS — I48.91 NEW ONSET ATRIAL FIBRILLATION (HCC): Primary | ICD-10-CM

## 2024-04-01 NOTE — TELEPHONE ENCOUNTER
Patient evaluated in hospital by Dr. Romeor.   Appt with Dr. Kuhn on 5/29/2024.  Review monitor strips, on Eliqus, Amiodarone and Metoprolol.   LM for patient to call back to see how she is feeling.

## 2024-04-01 NOTE — TELEPHONE ENCOUNTER
Pt returned call she states she thinks alot of her issue was pain meds, which she is off of now, she is asking is there a possibility for a ablation instead of the cardioversion?

## 2024-04-01 NOTE — TELEPHONE ENCOUNTER
We would try cardioversion before ablation to avoid invasive procedure upfront as this may be related to the surgery

## 2024-04-04 LAB
EKG ATRIAL RATE: 84 BPM
EKG P AXIS: 83 DEGREES
EKG P-R INTERVAL: 140 MS
EKG Q-T INTERVAL: 300 MS
EKG Q-T INTERVAL: 370 MS
EKG QRS DURATION: 66 MS
EKG QRS DURATION: 70 MS
EKG QTC CALCULATION (BAZETT): 437 MS
EKG QTC CALCULATION (BAZETT): 451 MS
EKG R AXIS: 60 DEGREES
EKG R AXIS: 78 DEGREES
EKG T AXIS: 69 DEGREES
EKG T AXIS: 8 DEGREES
EKG VENTRICULAR RATE: 136 BPM
EKG VENTRICULAR RATE: 84 BPM

## 2024-04-09 ENCOUNTER — TELEPHONE (OUTPATIENT)
Dept: CARDIOLOGY CLINIC | Age: 79
End: 2024-04-09

## 2024-04-09 NOTE — TELEPHONE ENCOUNTER
In and out of AF since having knee replacement   States she feel terrible when in AF.   States every time they schedule her for CV she converts on her own  Appointment moved up to May 1.

## 2024-04-14 LAB — ECHO BSA: 1.7 M2

## 2024-04-15 ENCOUNTER — TELEPHONE (OUTPATIENT)
Dept: CARDIOLOGY CLINIC | Age: 79
End: 2024-04-15

## 2024-04-15 NOTE — TELEPHONE ENCOUNTER
Spoke to patient, discussed. Pt aware Dr Nick DE SOUZA this week. Offered her an appointment with Lara on Wed. Pt declined. She is scheduled for ROSSI/CVN on Monday and to see Dr Kuhn 4/24. At this point she is going to try to continue with medication. She can easily tell when she is in Afib, and would prefer not to have to come in Monday just for an EKG if she knows she is rhythm. She will call Monday morning to cancel if she knows she is in rhythm. Will plan to keep appointment with Dr Kuhn and see how this week goes.

## 2024-04-15 NOTE — TELEPHONE ENCOUNTER
Message left by patient on nurse line, pt having side effects from Metoprolol and Amiodarone.  Call back to patient, had to  for return call.  Dr Romero is out this week.

## 2024-04-18 NOTE — PRE-PROCEDURE INSTRUCTIONS
Notified of Cardioversion procedure arrival time 0900 on Monday  [place] on [facility]nd [facility]  NPO after midnight  Bring drivers license and insurance information  Wear comfortable clean clothes  Shower morning of and night before with liquid antibacterial soap  Remove jewelry   Bring medications in original bottles - may take am meds with small amount of water.    Do not take any diabetic meds day of procedure.  Made aware of visitors limit to 2 at a time  Follow all instructions given by your physician  Please notify doctor office if you need to cancel or reschedule your procedure   needed at discharge  If you use CPap or BiPap for sleep apnea, please bring machine with you  Leave valuables at home     Pt states she is in NSR and will call Monday morning if she remains in NSR to cancel, per the request of cardiology office.

## 2024-04-19 ENCOUNTER — PREP FOR PROCEDURE (OUTPATIENT)
Dept: CARDIOLOGY | Age: 79
End: 2024-04-19

## 2024-04-19 RX ORDER — SODIUM CHLORIDE 0.9 % (FLUSH) 0.9 %
5-40 SYRINGE (ML) INJECTION PRN
OUTPATIENT
Start: 2024-04-19

## 2024-04-19 RX ORDER — SODIUM CHLORIDE 9 MG/ML
INJECTION, SOLUTION INTRAVENOUS PRN
OUTPATIENT
Start: 2024-04-19

## 2024-04-19 RX ORDER — SODIUM CHLORIDE 0.9 % (FLUSH) 0.9 %
5-40 SYRINGE (ML) INJECTION EVERY 12 HOURS SCHEDULED
OUTPATIENT
Start: 2024-04-19

## 2024-04-21 LAB — ECHO BSA: 1.7 M2

## 2024-04-22 ENCOUNTER — HOSPITAL ENCOUNTER (OUTPATIENT)
Age: 79
Discharge: HOME | End: 2024-04-22
Attending: INTERNAL MEDICINE

## 2024-04-23 NOTE — PATIENT INSTRUCTIONS
You may receive a survey regarding the care you received during your visit.  Your input is valuable to us.  We encourage you to complete and return your survey.  We hope you will choose us in the future for your healthcare needs.    Thank you for choosing [facility]!    Your Medical Assistant Today:  Nubia HERNÁNDEZ      Your RN Today:  Viridiana HERNÁNDEZ  Your Provider for Today: Dr. Kuhn  Ph. [phone]     Have a Great Day!          Please call the office if you would like to move forward with the ablation.

## 2024-04-24 ENCOUNTER — TELEPHONE (OUTPATIENT)
Dept: CARDIOLOGY CLINIC | Age: 79
End: 2024-04-24

## 2024-04-24 ENCOUNTER — OFFICE VISIT (OUTPATIENT)
Dept: CARDIOLOGY CLINIC | Age: 79
End: 2024-04-24
Payer: MEDICARE

## 2024-04-24 VITALS
OXYGEN SATURATION: 97 % | BODY MASS INDEX: 24.63 KG/M2 | HEART RATE: 79 BPM | SYSTOLIC BLOOD PRESSURE: 153 MMHG | HEIGHT: 63 IN | WEIGHT: 139 LBS | DIASTOLIC BLOOD PRESSURE: 82 MMHG

## 2024-04-24 DIAGNOSIS — I48.91 NEW ONSET ATRIAL FIBRILLATION (HCC): Primary | ICD-10-CM

## 2024-04-24 PROCEDURE — G8427 DOCREV CUR MEDS BY ELIG CLIN: HCPCS | Performed by: INTERNAL MEDICINE

## 2024-04-24 PROCEDURE — G8420 CALC BMI NORM PARAMETERS: HCPCS | Performed by: INTERNAL MEDICINE

## 2024-04-24 PROCEDURE — 93000 ELECTROCARDIOGRAM COMPLETE: CPT | Performed by: INTERNAL MEDICINE

## 2024-04-24 PROCEDURE — 1111F DSCHRG MED/CURRENT MED MERGE: CPT | Performed by: INTERNAL MEDICINE

## 2024-04-24 PROCEDURE — 1090F PRES/ABSN URINE INCON ASSESS: CPT | Performed by: INTERNAL MEDICINE

## 2024-04-24 PROCEDURE — G8400 PT W/DXA NO RESULTS DOC: HCPCS | Performed by: INTERNAL MEDICINE

## 2024-04-24 PROCEDURE — 1036F TOBACCO NON-USER: CPT | Performed by: INTERNAL MEDICINE

## 2024-04-24 PROCEDURE — 1123F ACP DISCUSS/DSCN MKR DOCD: CPT | Performed by: INTERNAL MEDICINE

## 2024-04-24 PROCEDURE — 99204 OFFICE O/P NEW MOD 45 MIN: CPT | Performed by: INTERNAL MEDICINE

## 2024-04-24 RX ORDER — AMIODARONE HYDROCHLORIDE 200 MG/1
200 TABLET ORAL DAILY
Qty: 30 TABLET | Refills: 2 | Status: SHIPPED | OUTPATIENT
Start: 2024-04-24 | End: 2024-04-24 | Stop reason: ALTCHOICE

## 2024-04-24 RX ORDER — FUROSEMIDE 20 MG/1
20 TABLET ORAL DAILY
Qty: 5 TABLET | Refills: 0 | Status: SHIPPED | OUTPATIENT
Start: 2024-04-24

## 2024-04-24 NOTE — TELEPHONE ENCOUNTER
Procedure: afib ablation   Date: 05.21.24  Arrival Time: 5am  Meds to Hold: Eliquis the evening prior, metoprolol 3 days prior.   Do you agree?        Instructions verbalized to the patient.  Instructions given to the patient.

## 2024-04-24 NOTE — PROGRESS NOTES
mouth 2 times daily 60 tablet 2    Cholecalciferol (VITAMIN D) 50 MCG (2000 UT) CAPS capsule Take 2,000 Units by mouth daily      Cranberry 250 MG TABS Take 500 mg by mouth daily      Multiple Vitamin (MULTI VITAMIN DAILY PO) Take 1 tablet by mouth daily      melatonin 3 MG TABS tablet Take 1 tablet by mouth nightly as needed (SLEEP) (Patient not taking: Reported on 4/24/2024)      diclofenac sodium (VOLTAREN) 1 % GEL Apply 4 g topically 4 times daily as needed for Pain (Patient not taking: Reported on 4/24/2024)       No current facility-administered medications for this visit.       Physical Examination:  Vitals:    04/24/24 1103   BP: (!) 153/82   Pulse: 79   SpO2: 97%      Body mass index is 24.62 kg/m².   GENERAL: Alert and oriented. No distress.  EYES: No pallor or icterus.  ENT: No cyanosis.  VESSELS: No jugular venous distension or carotid bruits.  HEART: Normal S1/S2. No murmur, rub or gallop.  LUNGS: Clear to auscultation.  ABDOMEN: Soft and non-tender.   EXTREMITIES: 2+ bilateral lower extremity edema. Feet are warm. Mild swelling left knee.  NEUROLOGICAL: Grossly normal.     Laboratory And Diagnostic Data  I have personally reviewed and interpreted the results of the following diagnostic testing    Lab Results   Component Value Date    WBC 9.1 03/25/2024    HGB 10.9 (L) 03/25/2024    HCT 33.8 (L) 03/25/2024     (H) 03/25/2024    CHOL 154 03/12/2024    TRIG 54 03/12/2024    HDL 53 03/12/2024    ALT 23 03/20/2024    AST 26 03/20/2024     (L) 03/29/2024    K 5.2 03/29/2024    CL 98 03/29/2024    CREATININE 0.7 03/29/2024    BUN 12 03/29/2024    CO2 26 03/29/2024    TSH 3.620 03/11/2024    INR 0.95 03/12/2024    LABA1C 5.5 03/04/2022         Echocardiogram 3/12/2024: LVEF 55 to 60%, LVWT 0.7 cm, LAD/LARISA 2.7 cm.  Mild MR.  RVSP 37.  ECG 3/12/2024: AF with RVR. ECH 4/24/24: Sinus rhythm   Coronary angiogram NA  Stress test NA  Event monitor 2/3/2024: Paroxysmal atrial flutter and atrial

## 2024-04-25 PROBLEM — I48.91 AFIB (HCC): Status: ACTIVE | Noted: 2024-04-24

## 2024-04-29 RX ORDER — FUROSEMIDE 20 MG/1
20 TABLET ORAL DAILY
Qty: 5 TABLET | Refills: 0 | Status: SHIPPED | OUTPATIENT
Start: 2024-04-29

## 2024-04-29 NOTE — TELEPHONE ENCOUNTER
Pt called today saying she still has swelling in her legs    Dr Kuhn gave her lasix for 5 days and stopped her amiodarone on 4/24, but pt thinks she may need more? Or something different?  Please advise

## 2024-05-02 ENCOUNTER — OFFICE VISIT (OUTPATIENT)
Dept: CARDIOLOGY CLINIC | Age: 79
End: 2024-05-02
Payer: MEDICARE

## 2024-05-02 VITALS
HEART RATE: 73 BPM | HEIGHT: 63 IN | WEIGHT: 131.8 LBS | SYSTOLIC BLOOD PRESSURE: 126 MMHG | BODY MASS INDEX: 23.35 KG/M2 | DIASTOLIC BLOOD PRESSURE: 71 MMHG

## 2024-05-02 DIAGNOSIS — I48.91 NEW ONSET ATRIAL FIBRILLATION (HCC): Primary | ICD-10-CM

## 2024-05-02 DIAGNOSIS — I10 ESSENTIAL HYPERTENSION: ICD-10-CM

## 2024-05-02 DIAGNOSIS — I48.91 ATRIAL FIBRILLATION WITH RAPID VENTRICULAR RESPONSE (HCC): ICD-10-CM

## 2024-05-02 PROCEDURE — 3078F DIAST BP <80 MM HG: CPT | Performed by: STUDENT IN AN ORGANIZED HEALTH CARE EDUCATION/TRAINING PROGRAM

## 2024-05-02 PROCEDURE — G8420 CALC BMI NORM PARAMETERS: HCPCS | Performed by: STUDENT IN AN ORGANIZED HEALTH CARE EDUCATION/TRAINING PROGRAM

## 2024-05-02 PROCEDURE — 3074F SYST BP LT 130 MM HG: CPT | Performed by: STUDENT IN AN ORGANIZED HEALTH CARE EDUCATION/TRAINING PROGRAM

## 2024-05-02 PROCEDURE — G8400 PT W/DXA NO RESULTS DOC: HCPCS | Performed by: STUDENT IN AN ORGANIZED HEALTH CARE EDUCATION/TRAINING PROGRAM

## 2024-05-02 PROCEDURE — G8427 DOCREV CUR MEDS BY ELIG CLIN: HCPCS | Performed by: STUDENT IN AN ORGANIZED HEALTH CARE EDUCATION/TRAINING PROGRAM

## 2024-05-02 PROCEDURE — 1123F ACP DISCUSS/DSCN MKR DOCD: CPT | Performed by: STUDENT IN AN ORGANIZED HEALTH CARE EDUCATION/TRAINING PROGRAM

## 2024-05-02 PROCEDURE — 99214 OFFICE O/P EST MOD 30 MIN: CPT | Performed by: STUDENT IN AN ORGANIZED HEALTH CARE EDUCATION/TRAINING PROGRAM

## 2024-05-02 PROCEDURE — 1090F PRES/ABSN URINE INCON ASSESS: CPT | Performed by: STUDENT IN AN ORGANIZED HEALTH CARE EDUCATION/TRAINING PROGRAM

## 2024-05-02 PROCEDURE — 1036F TOBACCO NON-USER: CPT | Performed by: STUDENT IN AN ORGANIZED HEALTH CARE EDUCATION/TRAINING PROGRAM

## 2024-05-02 NOTE — PROGRESS NOTES
[facility] PROFESSIONAL SERVICES  [facility]   [address]   [address]   [place] [address]   Dept: [phone]   Dept Fax: [phone]   Loc: [phone]      Chief Complaint   Patient presents with    Follow-Up from Hospital    Atrial Fibrillation     Cardiologist:  Dr. Romero/Andrea  77 yo female presents for hfu for recurrent afib with rvr. EF 55-60%, mild MR, HTN, s/p left TKA.     Patient with more sweling in her lower legs/ankles/feet. Thinks from the lopressor. She has tried lasix now from Dr andrea vazquez improvement. No pain or tightness. Does not bother her otherwise. Still with some fatigue. No chest pain. Occ afib with rvr she notices. Yesterday from roughly 4am to 11a. BP has been good.     General:   No fever, no chills, no weight loss, no fatigue  Pulmonary:    No dyspnea, no wheezing  Cardiac:    Denies recent chest pain   GI:     No nausea or vomiting, no abdominal pain  Neuro:     No dizziness or light headedness  Musculoskeletal:  No recent active issues  Extremities:   No edema      Past Medical History:   Diagnosis Date    Atrial fibrillation (HCC)        Allergies   Allergen Reactions    Sulfa Antibiotics Anaphylaxis       Current Outpatient Medications   Medication Sig Dispense Refill    furosemide (LASIX) 20 MG tablet Take 1 tablet by mouth daily 5 tablet 0    metoprolol tartrate (LOPRESSOR) 50 MG tablet Take 1 tablet by mouth 2 times daily 60 tablet 3    acetaminophen (TYLENOL) 500 MG tablet Take 2 tablets by mouth every 6 hours as needed for Pain 120 tablet 3    apixaban (ELIQUIS) 5 MG TABS tablet Take 1 tablet by mouth 2 times daily 60 tablet 2    melatonin 3 MG TABS tablet Take 1 tablet by mouth nightly as needed (SLEEP)      diclofenac sodium (VOLTAREN) 1 % GEL Apply 4 g topically 4 times daily as needed for Pain      Cholecalciferol (VITAMIN D) 50 MCG (2000 UT) CAPS capsule Take 2,000 Units by mouth daily      Cranberry 250 MG TABS Take 500 mg by mouth

## 2024-05-02 NOTE — PROGRESS NOTES
Follow-up.   She states her ankles are swollen and is wondering if it from her Metoprolol.   She states Dr. Kuhn put her on a short term round of Lasix and she has about 3 days left.   Patient's ablation is scheduled with Dr. Kuhn.

## 2024-05-02 NOTE — PATIENT INSTRUCTIONS
Your nurses today was XANDER Herrera  Your provider today was VIVIAN Whiting  Phone number: [phone]      You may receive a survey regarding the care you received during your visit.  Your input is valuable to us.  We encourage you to complete and return your survey.  We hope you will choose us in the future for your healthcare needs.

## 2024-05-20 ENCOUNTER — PREP FOR PROCEDURE (OUTPATIENT)
Dept: CARDIOLOGY | Age: 79
End: 2024-05-20

## 2024-05-20 RX ORDER — SODIUM CHLORIDE 0.9 % (FLUSH) 0.9 %
5-40 SYRINGE (ML) INJECTION PRN
Status: CANCELLED | OUTPATIENT
Start: 2024-05-20

## 2024-05-20 RX ORDER — SODIUM CHLORIDE 9 MG/ML
INJECTION, SOLUTION INTRAVENOUS PRN
Status: CANCELLED | OUTPATIENT
Start: 2024-05-20

## 2024-05-20 RX ORDER — SODIUM CHLORIDE 0.9 % (FLUSH) 0.9 %
5-40 SYRINGE (ML) INJECTION EVERY 12 HOURS SCHEDULED
Status: CANCELLED | OUTPATIENT
Start: 2024-05-20

## 2024-05-20 NOTE — PRE-PROCEDURE INSTRUCTIONS
No answer, left message to be Notified of Cardiac Ablation procedure arrival time 0500 on Tuesday  [place] on 2nd floor [facility]  NPO after midnight  Bring drivers license and insurance information  Wear comfortable clean clothes  Shower morning of and night before with liquid antibacterial soap  Remove jewelry   Bring medications in original bottles - may take am meds with small amount of water.    Do not take any diabetic meds day of procedure.  Made aware of visitors limit to 2 at a time  Follow all instructions given by your physician  Please notify doctor office if you need to cancel or reschedule your procedure   needed at discharge  If you use CPap or BiPap for sleep apnea, please bring machine with you  Leave valuables at home

## 2024-05-21 ENCOUNTER — ANESTHESIA (OUTPATIENT)
Age: 79
End: 2024-05-21
Payer: MEDICARE

## 2024-05-21 ENCOUNTER — HOSPITAL ENCOUNTER (OUTPATIENT)
Age: 79
Setting detail: OUTPATIENT SURGERY
Discharge: HOME | End: 2024-05-21
Attending: INTERNAL MEDICINE | Admitting: INTERNAL MEDICINE
Payer: MEDICARE

## 2024-05-21 ENCOUNTER — ANESTHESIA EVENT (OUTPATIENT)
Age: 79
End: 2024-05-21
Payer: MEDICARE

## 2024-05-21 VITALS
OXYGEN SATURATION: 96 % | RESPIRATION RATE: 16 BRPM | BODY MASS INDEX: 23.16 KG/M2 | DIASTOLIC BLOOD PRESSURE: 76 MMHG | HEIGHT: 63 IN | TEMPERATURE: 97.8 F | SYSTOLIC BLOOD PRESSURE: 132 MMHG | WEIGHT: 130.73 LBS | HEART RATE: 79 BPM

## 2024-05-21 DIAGNOSIS — I48.91 AFIB (HCC): ICD-10-CM

## 2024-05-21 PROBLEM — I48.92 ATRIAL FLUTTER (HCC): Status: ACTIVE | Noted: 2024-05-21

## 2024-05-21 LAB
ABO: NORMAL
ACTIVATED CLOTTING TIME: 128 SECONDS (ref 1–150)
ACTIVATED CLOTTING TIME: 293 SECONDS (ref 1–150)
ACTIVATED CLOTTING TIME: 391 SECONDS (ref 1–150)
ACTIVATED CLOTTING TIME: 440 SECONDS (ref 1–150)
ANION GAP SERPL CALC-SCNC: 9 MEQ/L (ref 8–16)
ANTIBODY SCREEN: NORMAL
APTT PPP: 31.6 SECONDS (ref 22–38)
BUN SERPL-MCNC: 25 MG/DL (ref 7–22)
CALCIUM SERPL-MCNC: 9.3 MG/DL (ref 8.5–10.5)
CHLORIDE SERPL-SCNC: 102 MEQ/L (ref 98–111)
CO2 SERPL-SCNC: 25 MEQ/L (ref 23–33)
CREAT SERPL-MCNC: 0.7 MG/DL (ref 0.4–1.2)
DEPRECATED RDW RBC AUTO: 54.1 FL (ref 35–45)
ECHO BSA: 1.62 M2
EKG ATRIAL RATE: 100 BPM
EKG ATRIAL RATE: 106 BPM
EKG P AXIS: 68 DEGREES
EKG P AXIS: 86 DEGREES
EKG P-R INTERVAL: 144 MS
EKG P-R INTERVAL: 144 MS
EKG Q-T INTERVAL: 332 MS
EKG Q-T INTERVAL: 356 MS
EKG QRS DURATION: 70 MS
EKG QRS DURATION: 72 MS
EKG QTC CALCULATION (BAZETT): 441 MS
EKG QTC CALCULATION (BAZETT): 459 MS
EKG R AXIS: 87 DEGREES
EKG R AXIS: 94 DEGREES
EKG T AXIS: 26 DEGREES
EKG T AXIS: 42 DEGREES
EKG VENTRICULAR RATE: 100 BPM
EKG VENTRICULAR RATE: 106 BPM
ERYTHROCYTE [DISTWIDTH] IN BLOOD BY AUTOMATED COUNT: 15.2 % (ref 11.5–14.5)
GFR SERPL CREATININE-BSD FRML MDRD: 88 ML/MIN/1.73M2
GLUCOSE SERPL-MCNC: 96 MG/DL (ref 70–108)
HCT VFR BLD AUTO: 41.4 % (ref 37–47)
HGB BLD-MCNC: 12.9 GM/DL (ref 12–16)
INR PPP: 0.98 (ref 0.85–1.13)
MCH RBC QN AUTO: 30.1 PG (ref 26–33)
MCHC RBC AUTO-ENTMCNC: 31.2 GM/DL (ref 32.2–35.5)
MCV RBC AUTO: 96.5 FL (ref 81–99)
PLATELET # BLD AUTO: 245 THOU/MM3 (ref 130–400)
PMV BLD AUTO: 8.4 FL (ref 9.4–12.4)
POTASSIUM SERPL-SCNC: 4.9 MEQ/L (ref 3.5–5.2)
RBC # BLD AUTO: 4.29 MILL/MM3 (ref 4.2–5.4)
RH FACTOR: NORMAL
SODIUM SERPL-SCNC: 136 MEQ/L (ref 135–145)
WBC # BLD AUTO: 7.5 THOU/MM3 (ref 4.8–10.8)

## 2024-05-21 PROCEDURE — 7100000010 HC PHASE II RECOVERY - FIRST 15 MIN: Performed by: INTERNAL MEDICINE

## 2024-05-21 PROCEDURE — 6360000002 HC RX W HCPCS: Performed by: NURSE ANESTHETIST, CERTIFIED REGISTERED

## 2024-05-21 PROCEDURE — 93657 TX L/R ATRIAL FIB ADDL: CPT | Performed by: INTERNAL MEDICINE

## 2024-05-21 PROCEDURE — 86900 BLOOD TYPING SEROLOGIC ABO: CPT

## 2024-05-21 PROCEDURE — 2580000003 HC RX 258: Performed by: STUDENT IN AN ORGANIZED HEALTH CARE EDUCATION/TRAINING PROGRAM

## 2024-05-21 PROCEDURE — 3700000000 HC ANESTHESIA ATTENDED CARE: Performed by: INTERNAL MEDICINE

## 2024-05-21 PROCEDURE — 7100000011 HC PHASE II RECOVERY - ADDTL 15 MIN: Performed by: INTERNAL MEDICINE

## 2024-05-21 PROCEDURE — 86850 RBC ANTIBODY SCREEN: CPT

## 2024-05-21 PROCEDURE — 86901 BLOOD TYPING SEROLOGIC RH(D): CPT

## 2024-05-21 PROCEDURE — 36415 COLL VENOUS BLD VENIPUNCTURE: CPT

## 2024-05-21 PROCEDURE — 7100000000 HC PACU RECOVERY - FIRST 15 MIN: Performed by: INTERNAL MEDICINE

## 2024-05-21 PROCEDURE — 85730 THROMBOPLASTIN TIME PARTIAL: CPT

## 2024-05-21 PROCEDURE — 93010 ELECTROCARDIOGRAM REPORT: CPT | Performed by: INTERNAL MEDICINE

## 2024-05-21 PROCEDURE — 93656 COMPRE EP EVAL ABLTJ ATR FIB: CPT | Performed by: INTERNAL MEDICINE

## 2024-05-21 PROCEDURE — 85347 COAGULATION TIME ACTIVATED: CPT

## 2024-05-21 PROCEDURE — 85027 COMPLETE CBC AUTOMATED: CPT

## 2024-05-21 PROCEDURE — 85610 PROTHROMBIN TIME: CPT

## 2024-05-21 PROCEDURE — G0269 OCCLUSIVE DEVICE IN VEIN ART: HCPCS | Performed by: INTERNAL MEDICINE

## 2024-05-21 PROCEDURE — 7100000001 HC PACU RECOVERY - ADDTL 15 MIN: Performed by: INTERNAL MEDICINE

## 2024-05-21 PROCEDURE — 80048 BASIC METABOLIC PNL TOTAL CA: CPT

## 2024-05-21 PROCEDURE — 3700000001 HC ADD 15 MINUTES (ANESTHESIA): Performed by: INTERNAL MEDICINE

## 2024-05-21 PROCEDURE — 93655 ICAR CATH ABLTJ DSCRT ARRHYT: CPT | Performed by: INTERNAL MEDICINE

## 2024-05-21 PROCEDURE — 93005 ELECTROCARDIOGRAM TRACING: CPT | Performed by: INTERNAL MEDICINE

## 2024-05-21 PROCEDURE — 93005 ELECTROCARDIOGRAM TRACING: CPT | Performed by: STUDENT IN AN ORGANIZED HEALTH CARE EDUCATION/TRAINING PROGRAM

## 2024-05-21 PROCEDURE — 2500000003 HC RX 250 WO HCPCS: Performed by: INTERNAL MEDICINE

## 2024-05-21 RX ORDER — ONDANSETRON 2 MG/ML
INJECTION INTRAMUSCULAR; INTRAVENOUS PRN
Status: DISCONTINUED | OUTPATIENT
Start: 2024-05-21 | End: 2024-05-21

## 2024-05-21 RX ORDER — SODIUM CHLORIDE 9 MG/ML
INJECTION, SOLUTION INTRAVENOUS PRN
Status: DISCONTINUED | OUTPATIENT
Start: 2024-05-21 | End: 2024-05-21 | Stop reason: HOSPADM

## 2024-05-21 RX ORDER — NALOXONE HYDROCHLORIDE 0.4 MG/ML
INJECTION, SOLUTION INTRAMUSCULAR; INTRAVENOUS; SUBCUTANEOUS PRN
Status: DISCONTINUED | OUTPATIENT
Start: 2024-05-21 | End: 2024-05-21 | Stop reason: HOSPADM

## 2024-05-21 RX ORDER — FUROSEMIDE 10 MG/ML
INJECTION INTRAMUSCULAR; INTRAVENOUS PRN
Status: DISCONTINUED | OUTPATIENT
Start: 2024-05-21 | End: 2024-05-21 | Stop reason: SDUPTHER

## 2024-05-21 RX ORDER — FENTANYL CITRATE 50 UG/ML
INJECTION, SOLUTION INTRAMUSCULAR; INTRAVENOUS PRN
Status: DISCONTINUED | OUTPATIENT
Start: 2024-05-21 | End: 2024-05-21 | Stop reason: SDUPTHER

## 2024-05-21 RX ORDER — ONDANSETRON 2 MG/ML
4 INJECTION INTRAMUSCULAR; INTRAVENOUS
Status: DISCONTINUED | OUTPATIENT
Start: 2024-05-21 | End: 2024-05-21 | Stop reason: HOSPADM

## 2024-05-21 RX ORDER — DEXAMETHASONE SODIUM PHOSPHATE 10 MG/ML
INJECTION, EMULSION INTRAMUSCULAR; INTRAVENOUS PRN
Status: DISCONTINUED | OUTPATIENT
Start: 2024-05-21 | End: 2024-05-21 | Stop reason: SDUPTHER

## 2024-05-21 RX ORDER — SUCCINYLCHOLINE CHLORIDE 20 MG/ML
INJECTION INTRAMUSCULAR; INTRAVENOUS PRN
Status: DISCONTINUED | OUTPATIENT
Start: 2024-05-21 | End: 2024-05-21 | Stop reason: SDUPTHER

## 2024-05-21 RX ORDER — PANTOPRAZOLE SODIUM 40 MG/1
40 TABLET, DELAYED RELEASE ORAL
Qty: 30 TABLET | Refills: 0 | Status: SHIPPED | OUTPATIENT
Start: 2024-05-21

## 2024-05-21 RX ORDER — ONDANSETRON 2 MG/ML
INJECTION INTRAMUSCULAR; INTRAVENOUS PRN
Status: DISCONTINUED | OUTPATIENT
Start: 2024-05-21 | End: 2024-05-21 | Stop reason: SDUPTHER

## 2024-05-21 RX ORDER — FENTANYL CITRATE 50 UG/ML
50 INJECTION, SOLUTION INTRAMUSCULAR; INTRAVENOUS EVERY 5 MIN PRN
Status: DISCONTINUED | OUTPATIENT
Start: 2024-05-21 | End: 2024-05-21 | Stop reason: HOSPADM

## 2024-05-21 RX ORDER — PROPOFOL 10 MG/ML
INJECTION, EMULSION INTRAVENOUS PRN
Status: DISCONTINUED | OUTPATIENT
Start: 2024-05-21 | End: 2024-05-21 | Stop reason: SDUPTHER

## 2024-05-21 RX ORDER — PHENYLEPHRINE HCL IN 0.9% NACL 1 MG/10 ML
SYRINGE (ML) INTRAVENOUS PRN
Status: DISCONTINUED | OUTPATIENT
Start: 2024-05-21 | End: 2024-05-21 | Stop reason: SDUPTHER

## 2024-05-21 RX ORDER — PROTAMINE SULFATE 10 MG/ML
INJECTION, SOLUTION INTRAVENOUS PRN
Status: DISCONTINUED | OUTPATIENT
Start: 2024-05-21 | End: 2024-05-21 | Stop reason: SDUPTHER

## 2024-05-21 RX ORDER — SODIUM CHLORIDE 0.9 % (FLUSH) 0.9 %
5-40 SYRINGE (ML) INJECTION PRN
Status: DISCONTINUED | OUTPATIENT
Start: 2024-05-21 | End: 2024-05-21 | Stop reason: HOSPADM

## 2024-05-21 RX ORDER — DIPHENHYDRAMINE HYDROCHLORIDE 50 MG/ML
12.5 INJECTION INTRAMUSCULAR; INTRAVENOUS
Status: DISCONTINUED | OUTPATIENT
Start: 2024-05-21 | End: 2024-05-21 | Stop reason: HOSPADM

## 2024-05-21 RX ORDER — SODIUM CHLORIDE 0.9 % (FLUSH) 0.9 %
5-40 SYRINGE (ML) INJECTION EVERY 12 HOURS SCHEDULED
Status: DISCONTINUED | OUTPATIENT
Start: 2024-05-21 | End: 2024-05-21 | Stop reason: HOSPADM

## 2024-05-21 RX ORDER — VASOPRESSIN 20 U/ML
INJECTION PARENTERAL PRN
Status: DISCONTINUED | OUTPATIENT
Start: 2024-05-21 | End: 2024-05-21 | Stop reason: SDUPTHER

## 2024-05-21 RX ORDER — HEPARIN SODIUM 1000 [USP'U]/ML
INJECTION, SOLUTION INTRAVENOUS; SUBCUTANEOUS PRN
Status: DISCONTINUED | OUTPATIENT
Start: 2024-05-21 | End: 2024-05-21 | Stop reason: SDUPTHER

## 2024-05-21 RX ADMIN — FENTANYL CITRATE 50 MCG: 50 INJECTION, SOLUTION INTRAMUSCULAR; INTRAVENOUS at 08:15

## 2024-05-21 RX ADMIN — Medication 200 MCG: at 08:15

## 2024-05-21 RX ADMIN — DEXAMETHASONE SODIUM PHOSPHATE 10 MG: 10 INJECTION, EMULSION INTRAMUSCULAR; INTRAVENOUS at 07:34

## 2024-05-21 RX ADMIN — Medication 100 MG: at 07:29

## 2024-05-21 RX ADMIN — Medication 200 MCG: at 08:24

## 2024-05-21 RX ADMIN — HEPARIN SODIUM 10000 UNITS: 1000 INJECTION INTRAVENOUS; SUBCUTANEOUS at 08:33

## 2024-05-21 RX ADMIN — SODIUM CHLORIDE: 9 INJECTION, SOLUTION INTRAVENOUS at 06:49

## 2024-05-21 RX ADMIN — PROPOFOL 150 MG: 10 INJECTION, EMULSION INTRAVENOUS at 07:29

## 2024-05-21 RX ADMIN — VASOPRESSIN 4 UNITS: 20 INJECTION INTRAVENOUS at 08:39

## 2024-05-21 RX ADMIN — FUROSEMIDE 20 MG: 10 INJECTION, SOLUTION INTRAMUSCULAR; INTRAVENOUS at 10:49

## 2024-05-21 RX ADMIN — HEPARIN SODIUM 5000 UNITS: 1000 INJECTION INTRAVENOUS; SUBCUTANEOUS at 08:27

## 2024-05-21 RX ADMIN — Medication 100 MCG: at 07:54

## 2024-05-21 RX ADMIN — VASOPRESSIN 1 UNITS: 20 INJECTION INTRAVENOUS at 09:18

## 2024-05-21 RX ADMIN — Medication 200 MCG: at 08:03

## 2024-05-21 RX ADMIN — Medication 200 MCG: at 08:27

## 2024-05-21 RX ADMIN — PROTAMINE SULFATE 80 MG: 10 INJECTION, SOLUTION INTRAVENOUS at 10:49

## 2024-05-21 RX ADMIN — ONDANSETRON 4 MG: 2 INJECTION INTRAMUSCULAR; INTRAVENOUS at 10:49

## 2024-05-21 ASSESSMENT — PAIN DESCRIPTION - DESCRIPTORS: DESCRIPTORS: ACHING

## 2024-05-21 ASSESSMENT — PAIN SCALES - GENERAL: PAINLEVEL_OUTOF10: 5

## 2024-05-21 ASSESSMENT — PAIN DESCRIPTION - LOCATION: LOCATION: BACK

## 2024-05-21 NOTE — DISCHARGE INSTRUCTIONS
Activity:     1.  Do your normal activities except:             No heavy lifting more than 10 pounds for 3 days.              No strenuous activity for 7 days.              No driving for 24 hours.        2. You may shower in the morning, but no tub baths for 3 days.      3.  Ask your doctor when you can return to work.     4.  Remove the bandages from the puncture sites the next day if they have not already fallen off.     5. You may cover the site with a regular bandage for another day to keep the site clean and dry.     Diet:      You may resume your previous diet.     Care of Access site (groin and neck)     1. Examine the puncture site daily until it is well-healed.      2. Some bruising is expected and will slowly disappear.      3. Minor pain/soreness is also normal.       Medications:      Follow the schedule of medicines given  by your doctor.     Things to report to your doctor     1.  Fever, swelling, bleeding.     2.  Redness at the puncture site.      3.  Warm or hot sensation at puncture sites.      4.  Purulence or drainage of fluid from the puncture site.      5.  Numbness, tingling or coldness in the affected leg.     Appointments:  Call your doctor at the following number , [phone] to set up an appointment for one month after.    Call your doctor immediately:      1. If you have a fever, drainage from your puncture site, persistent chest discomfort , or uncontrolled heart rate.     2.If you cannot contact your doctor, go to the nearest emergency room.          If bleeding or swelling occurs to neck or groin sites, apply pressure, call 911 or the doctor and maintain pressure over site.              SEDATION/ANALGESIA INFORMATION/HOME GOING ADVICE    SEDATION / ANALGESIA INFORMATION / HOME GOING ADVICE  You have received the sedation/analgesia medication during your visit     Sedation/analgesia is used during short medical procedures under controlled supervision. The medication will produce a

## 2024-05-21 NOTE — PROGRESS NOTES
0457 Pt arrived ambulatory with daughter to 2E 04. Admission in progress.   0652 Dr. Kuhn in to see pt.   0720 Pt to cath lab via bed per cath lab staff.   1201 Pt received from PACU, monitor attached.   1215 Bedside to XANDER Loja

## 2024-05-21 NOTE — FLOWSHEET NOTE
05/21/24 1229   AVS Reviewed   AVS & discharge instructions reviewed with patient and/or representative? Yes   Reviewed instructions with Patient   Level of Understanding Questions answered;Verbalized understanding

## 2024-05-21 NOTE — ANESTHESIA POSTPROCEDURE EVALUATION
Department of Anesthesiology  Postprocedure Note    Patient: Kirstin Estrada  MRN: [id number]  YOB: 1945  Date of evaluation: 5/21/2024    Procedure Summary       Date: 05/21/24 Room / Location: [facility] CATH LAB  / [facility] CARDIAC CATH LAB    Anesthesia Start: 0721 Anesthesia Stop: 1112    Procedure: Ablation A-fib w complete ep study Diagnosis:       Afib (HCC)      (Afib (HCC) [I48.91])    Providers: Steve Kuhn MD Responsible Provider: Shant Burgos DO    Anesthesia Type: general ASA Status: 3            Anesthesia Type: No value filed.    Michael Phase I: Michael Score: 10    Michael Phase II:      Anesthesia Post Evaluation    Patient location during evaluation: PACU  Patient participation: complete - patient participated  Level of consciousness: awake and alert  Airway patency: patent  Nausea & Vomiting: no vomiting and no nausea  Cardiovascular status: hemodynamically stable  Respiratory status: acceptable and room air  Hydration status: stable  Pain management: adequate    No notable events documented.

## 2024-05-21 NOTE — PROCEDURES
[facility]  HEART CENTER (EP)  [address] [address]  Dept: [phone]  CARDIAC ELECTROPHYSIOLOGY: PROCEDURE NOTE  Patients Demographics:  Date:   5/21/2024  Patient name:              Kirstin Estrada  YOB: 1945  Sex:    female   MRN:   [id number]    Cardiac Electrophysiologist:   Steve Kuhn MD, MRCP, FACC, RS    Primary Care Provider:     Evelio Hennessy MD     Cardiologist:  Cheryl Nevarez MD    Procedure Planned:  Atrial fibrillation and atrial flutter catheter ablation.     Indication/s for the Procedure:  Drug refractory symptomatic paroxysmal atrial fibrillation.     Brief Clinical Summary:  79/F with symptomatic PAF and atrial flutter refractory to amiodarone, electively presents for catheter ablation. Held Apixaban last night. Normal LVEF.  Medical history: Paroxysmal atrial fibrillation (3/20/2022), atrial flutter and hypertension,      Procedure/s Performed:  Left and right pulmonary vein isolation in pairs.  Empiric Cavo-tricuspid isthmus ablation.   Electrophysiology study.     Post operative diagnosis:  Atrial fibrillation.  Non-sustained atrial flutter.      Description of the Procedure:  The patient was brought to electrophysiology laboratory in a fasting and non-sedated state.  General anesthesia was administered by anesthesia Service. She was prepped and draped in the usual sterile fashion. Lidocaine, 2% was injected into femoral regions and right side of the neck for local anesthesia. Vascular access was obtained under ultrasound guidance and hemostatic short sheaths placed over the guidewires (9-Canadian and 7-Canadian in the right femoral vein, 11-Canadian in left femoral vein, and 8-Canadian in the right internal jugular vein).  A 3D geometry of right atrium and coronary sinus was created using 3.5 mm irrigated tip contact-sense catheter (D/F STSF). A dual site 7-Canadian catheter (Medical ) was inserted through the right internal

## 2024-05-21 NOTE — ANESTHESIA PRE PROCEDURE
Department of Anesthesiology  Preprocedure Note       Name:  Kirstin Estrada   Age:  79 y.o.  :  1945                                          MRN:  [id number]         Date:  2024      Surgeon: Surgeon(s):  Steve Kuhn MD    Procedure: Procedure(s):  Ablation A-fib w complete ep study    Medications prior to admission:   Prior to Admission medications    Medication Sig Start Date End Date Taking? Authorizing Provider   furosemide (LASIX) 20 MG tablet Take 1 tablet by mouth daily  Patient not taking: Reported on 2024   Steve Kuhn MD   metoprolol tartrate (LOPRESSOR) 50 MG tablet Take 1 tablet by mouth 2 times daily 3/29/24   Maryan Wilkes APRN - CNP   acetaminophen (TYLENOL) 500 MG tablet Take 2 tablets by mouth every 6 hours as needed for Pain 3/28/24   Maryan Wilkes APRN - CNP   apixaban (ELIQUIS) 5 MG TABS tablet Take 1 tablet by mouth 2 times daily 3/28/24   Maryan Wilkes APRYASMEEN - CNP   melatonin 3 MG TABS tablet Take 1 tablet by mouth nightly as needed (SLEEP) 3/28/24   Maryan Wilkes APRN - CNP   diclofenac sodium (VOLTAREN) 1 % GEL Apply 4 g topically 4 times daily as needed for Pain 3/28/24   Maryan Wilkes APRN - CNP   Cholecalciferol (VITAMIN D) 50 MCG (2000 UT) CAPS capsule Take 2,000 Units by mouth daily    Elvin Almonte MD   Cranberry 250 MG TABS Take 500 mg by mouth daily    Elvin Almonte MD   Multiple Vitamin (MULTI VITAMIN DAILY PO) Take 1 tablet by mouth daily    Elvin Almonte MD       Current medications:    Current Facility-Administered Medications   Medication Dose Route Frequency Provider Last Rate Last Admin   • sodium chloride flush 0.9 % injection 5-40 mL  5-40 mL IntraVENous 2 times per day Mortimer, Connor, PA-C       • sodium chloride flush 0.9 % injection 5-40 mL  5-40 mL IntraVENous PRN Mortimer, Connor, PA-C       • 0.9 % sodium chloride infusion   IntraVENous PRN Mortimer, Connor, PA-C 50 mL/hr at

## 2024-05-21 NOTE — H&P
[facility]  HEART CENTER (EP)  [address] [address]  H&P and SEDATION/ANALGESIA     Patient demographics:  Date:   5/21/2024  Patient name: Kirstin Estrada  YOB: 1945  Sex: female   MRN:   [id number]    Reason for admission or planned procedure:  Afib and Aflu ablation.     Code Status: Full Code    Consent:.The risk and benefits of Afib and Aflu catheter ablation including bleeding, vascular complications, pericardial tamponade requiring emergency pericardiocentesis or emergency sternotomy and placement of pericardial drain or emergency sternotomy, phrenic nerve injury, pulmonary vein stenosis, atrio-esophageal fistula,  stroke, MI, death, potential exposure to radiation and recurrent atrial tachy-arrhythmia requiring further ablations and/or initiation of AAD therapy were discussed with the patient. She verbalized understanding of the discussion. Her questions were answered. The patient wishes to proceed.      Brief clinical summary:  79/F with symptomatic PAF and atrial flutter refractory to amiodarone, electively presents for catheter ablation. Held Apixaban last night. Normal LVEF.  Medical history: Paroxysmal atrial fibrillation (3/20/2022), atrial flutter and hypertension,     Past Medical History:  Past Medical History:   Diagnosis Date    Atrial fibrillation (HCC)        Past Surgical History:  Past Surgical History:   Procedure Laterality Date    HYSTERECTOMY (CERVIX STATUS UNKNOWN)  1995    JOINT REPLACEMENT Left 03/2024    Marie    OVARY REMOVAL  1995        Allergies:   Allergies as of 04/25/2024 - Fully Reviewed 04/24/2024   Allergen Reaction Noted    Sulfa antibiotics Anaphylaxis 06/24/2017     Additional information:       Medications     Current Facility-Administered Medications:     sodium chloride flush 0.9 % injection 5-40 mL, 5-40 mL, IntraVENous, 2 times per day, Mortimer, Connor, PA-C    sodium chloride flush 0.9 % injection 5-40 mL,

## 2024-05-21 NOTE — FLOWSHEET NOTE
Iv catheter removed. Telemetry off. Bilateral groin sites and right neck site stable. Pt dressing for discharge to home post cardiac ablation. Questions addressed. Dtr present.

## 2024-05-21 NOTE — FLOWSHEET NOTE
Pt heart rate in 130's after ambulation. Pt encouraged to take home dose of metoprolol. Bilateral groin sites and right neck site stable. Iv fluids stopped. Taking fluids. External female urinary catheter removed prior to ambulation. Bed linens heavy with urine leakage.

## 2024-05-21 NOTE — BRIEF OP NOTE
Brief Postoperative Note    Date:   5/21/2024  Patient name: Kirstin Estrada  YOB: 1945  Sex: female   MRN:   [id number]    PCP: Evelio Hennessy MD     Procedure:AF and Aflu ablation.     Pre-Op Diagnosis: Atrial fibrillation and atrial flutter.     Post-Op Diagnosis: Same    Surgeon: Steve Kuhn MD, MRCP, FACC, FHRS    Assistant: Julia.     Anesthesia/sedation:  Local lidocaine/fentanyl and midazolam as needed.     Estimated Blood Loss (mL): less than 50     Complications: None    Recommendations:  See orders in Epic.  Bed rest for 2 hours.  Watch access site/s for bleeding or swelling.  Hold pressure if bleeding or swelling.  Ambulate 2 hour after suture/sheath removal.  Remove Baez's catheter (if in place) once patient ambulates.  Stop IV fluids once patient starts eating.  Resume home medications as tonight.   Follow up in 4 weeks in EP clinic.           Electronically signed by Steve Kuhn MD, FACC, FHRS on 5/21/2024 at 11:41 AM

## 2024-05-22 ENCOUNTER — TELEPHONE (OUTPATIENT)
Dept: CARDIOLOGY CLINIC | Age: 79
End: 2024-05-22

## 2024-05-22 NOTE — TELEPHONE ENCOUNTER
..POD 1 at fib ablation 5/21/24    Denies any bleeding/drainage/ hematoma at IJ and bilateral groin punctures   Has restarted eliquis   Aware to call office with issues    Throat sore from ETT  ice chips ok

## 2024-05-28 ENCOUNTER — NURSE ONLY (OUTPATIENT)
Dept: CARDIOLOGY CLINIC | Age: 79
End: 2024-05-28

## 2024-05-28 NOTE — PROGRESS NOTES
.Pt here post afib ablation  for incision check.     Bilateral groin, and right jugular sites free of hematoma, hard knots, redness, or drainage. No bruising, numbness or tingling noted.     Aware to call the office in anything changes.   Pt says she feels good. She said she may have been going in and out of afib over the weekend. Told her if she goes in the afib and does not come out of it to call our office .

## 2024-06-07 ENCOUNTER — HOSPITAL ENCOUNTER (OUTPATIENT)
Dept: MAMMOGRAPHY | Age: 79
Discharge: HOME | End: 2024-06-07
Payer: MEDICARE

## 2024-06-07 DIAGNOSIS — Z12.31 VISIT FOR SCREENING MAMMOGRAM: ICD-10-CM

## 2024-06-07 PROCEDURE — 77063 BREAST TOMOSYNTHESIS BI: CPT

## 2024-06-18 NOTE — PATIENT INSTRUCTIONS
You may receive a survey regarding the care you received during your visit.  Your input is valuable to us.  We encourage you to complete and return your survey.  We hope you will choose us in the future for your healthcare needs.    Thank you for choosing [facility]!    Your Medical Assistant Today:  Nubia HERNÁNDEZ      Your RN Today:  Viridiana HERNÁNDEZ  Your Provider for Today: Dr. Kuhn  Ph. [phone]     Have a Great Day!          If your physician has ordered procedures/ testing and you have not been contacted with in 2 days after your office visit,  please call our office.     If you have had your testing performed -  please allow 48 hours for our office to notify you of the results.  If you have not heard from us with in the 48 hrs,  please call the office.     Results for the 14 day and 30 day heart monitor - please allow 7-10 business days after the monitor is mailed back.    [phone] opt 1    Decrease Metoprolol to 25mg twice daily.

## 2024-06-19 ENCOUNTER — OFFICE VISIT (OUTPATIENT)
Dept: CARDIOLOGY CLINIC | Age: 79
End: 2024-06-19
Payer: MEDICARE

## 2024-06-19 VITALS
OXYGEN SATURATION: 100 % | HEIGHT: 63 IN | HEART RATE: 74 BPM | SYSTOLIC BLOOD PRESSURE: 138 MMHG | DIASTOLIC BLOOD PRESSURE: 81 MMHG | BODY MASS INDEX: 23.25 KG/M2 | WEIGHT: 131.2 LBS

## 2024-06-19 DIAGNOSIS — I48.91 ATRIAL FIBRILLATION WITH RAPID VENTRICULAR RESPONSE (HCC): ICD-10-CM

## 2024-06-19 DIAGNOSIS — I48.91 NEW ONSET ATRIAL FIBRILLATION (HCC): Primary | ICD-10-CM

## 2024-06-19 PROCEDURE — 1123F ACP DISCUSS/DSCN MKR DOCD: CPT | Performed by: INTERNAL MEDICINE

## 2024-06-19 PROCEDURE — 1090F PRES/ABSN URINE INCON ASSESS: CPT | Performed by: INTERNAL MEDICINE

## 2024-06-19 PROCEDURE — G8427 DOCREV CUR MEDS BY ELIG CLIN: HCPCS | Performed by: INTERNAL MEDICINE

## 2024-06-19 PROCEDURE — G8400 PT W/DXA NO RESULTS DOC: HCPCS | Performed by: INTERNAL MEDICINE

## 2024-06-19 PROCEDURE — 93000 ELECTROCARDIOGRAM COMPLETE: CPT | Performed by: INTERNAL MEDICINE

## 2024-06-19 PROCEDURE — 99214 OFFICE O/P EST MOD 30 MIN: CPT | Performed by: INTERNAL MEDICINE

## 2024-06-19 PROCEDURE — 1036F TOBACCO NON-USER: CPT | Performed by: INTERNAL MEDICINE

## 2024-06-19 PROCEDURE — G8420 CALC BMI NORM PARAMETERS: HCPCS | Performed by: INTERNAL MEDICINE

## 2024-06-19 NOTE — PROGRESS NOTES
metoprolol.  Decrease metoprolol to tartrate 25 mg twice daily and then discontinue.  Continue anticoagulation.  Apixaban.  Follow-up in a year    Thank you for allowing me to participate in the care of your patient. Please call me if you have any questions.      **This report has been created using voice recognition software. It may contain minor errors which are inherent in voice recognition technology.**       Electronically signed by Steve Kuhn MD, MRCP, FACC, FHRS on 6/19/2024 at 11:05 AM

## 2024-06-22 RX ORDER — METOPROLOL TARTRATE 50 MG/1
25 TABLET, FILM COATED ORAL 2 TIMES DAILY
Qty: 60 TABLET | Refills: 3
Start: 2024-06-22

## 2024-09-07 ENCOUNTER — HOSPITAL ENCOUNTER (EMERGENCY)
Age: 79
Discharge: HOME | End: 2024-09-07
Attending: EMERGENCY MEDICINE
Payer: MEDICARE

## 2024-09-07 VITALS
DIASTOLIC BLOOD PRESSURE: 83 MMHG | HEIGHT: 63 IN | HEART RATE: 85 BPM | WEIGHT: 135 LBS | TEMPERATURE: 98 F | OXYGEN SATURATION: 98 % | BODY MASS INDEX: 23.92 KG/M2 | RESPIRATION RATE: 18 BRPM | SYSTOLIC BLOOD PRESSURE: 139 MMHG

## 2024-09-07 DIAGNOSIS — I48.92 ATRIAL FLUTTER, UNSPECIFIED TYPE (HCC): ICD-10-CM

## 2024-09-07 DIAGNOSIS — Z79.01 CHRONIC ANTICOAGULATION: ICD-10-CM

## 2024-09-07 DIAGNOSIS — R04.0 EPISTAXIS: Primary | ICD-10-CM

## 2024-09-07 LAB
ANION GAP SERPL CALC-SCNC: 15 MEQ/L (ref 8–16)
BASOPHILS ABSOLUTE: 0 THOU/MM3 (ref 0–0.1)
BASOPHILS NFR BLD AUTO: 0.6 %
BUN SERPL-MCNC: 20 MG/DL (ref 7–22)
CALCIUM SERPL-MCNC: 9.5 MG/DL (ref 8.5–10.5)
CHLORIDE SERPL-SCNC: 96 MEQ/L (ref 98–111)
CO2 SERPL-SCNC: 24 MEQ/L (ref 23–33)
CREAT SERPL-MCNC: 0.7 MG/DL (ref 0.4–1.2)
DEPRECATED RDW RBC AUTO: 45.4 FL (ref 35–45)
EKG ATRIAL RATE: 394 BPM
EKG Q-T INTERVAL: 294 MS
EKG QRS DURATION: 70 MS
EKG QTC CALCULATION (BAZETT): 472 MS
EKG R AXIS: 75 DEGREES
EKG T AXIS: -30 DEGREES
EKG VENTRICULAR RATE: 155 BPM
EOSINOPHIL NFR BLD AUTO: 2.4 %
EOSINOPHILS ABSOLUTE: 0.2 THOU/MM3 (ref 0–0.4)
ERYTHROCYTE [DISTWIDTH] IN BLOOD BY AUTOMATED COUNT: 13.2 % (ref 11.5–14.5)
GFR SERPL CREATININE-BSD FRML MDRD: 88 ML/MIN/1.73M2
GLUCOSE SERPL-MCNC: 107 MG/DL (ref 70–108)
HCT VFR BLD AUTO: 40.1 % (ref 37–47)
HGB BLD-MCNC: 13 GM/DL (ref 12–16)
IMM GRANULOCYTES # BLD AUTO: 0.02 THOU/MM3 (ref 0–0.07)
IMM GRANULOCYTES NFR BLD AUTO: 0.3 %
LYMPHOCYTES ABSOLUTE: 1.6 THOU/MM3 (ref 1–4.8)
LYMPHOCYTES NFR BLD AUTO: 22.8 %
MAGNESIUM SERPL-MCNC: 1.8 MG/DL (ref 1.6–2.4)
MCH RBC QN AUTO: 30.6 PG (ref 26–33)
MCHC RBC AUTO-ENTMCNC: 32.4 GM/DL (ref 32.2–35.5)
MCV RBC AUTO: 94.4 FL (ref 81–99)
MONOCYTES ABSOLUTE: 0.5 THOU/MM3 (ref 0.4–1.3)
MONOCYTES NFR BLD AUTO: 7.1 %
NEUTROPHILS ABSOLUTE: 4.7 THOU/MM3 (ref 1.8–7.7)
NEUTROPHILS NFR BLD AUTO: 66.8 %
NRBC BLD AUTO-RTO: 0 /100 WBC
OSMOLALITY SERPL CALC.SUM OF ELEC: 273.2 MOSMOL/KG (ref 275–300)
PLATELET # BLD AUTO: 230 THOU/MM3 (ref 130–400)
PMV BLD AUTO: 8.8 FL (ref 9.4–12.4)
POTASSIUM SERPL-SCNC: 3.9 MEQ/L (ref 3.5–5.2)
RBC # BLD AUTO: 4.25 MILL/MM3 (ref 4.2–5.4)
SODIUM SERPL-SCNC: 135 MEQ/L (ref 135–145)
TROPONIN, HIGH SENSITIVITY: 13 NG/L (ref 0–12)
WBC # BLD AUTO: 7 THOU/MM3 (ref 4.8–10.8)

## 2024-09-07 PROCEDURE — 30903 CONTROL OF NOSEBLEED: CPT

## 2024-09-07 PROCEDURE — 85025 COMPLETE CBC W/AUTO DIFF WBC: CPT

## 2024-09-07 PROCEDURE — 84484 ASSAY OF TROPONIN QUANT: CPT

## 2024-09-07 PROCEDURE — 83735 ASSAY OF MAGNESIUM: CPT

## 2024-09-07 PROCEDURE — 99284 EMERGENCY DEPT VISIT MOD MDM: CPT

## 2024-09-07 PROCEDURE — 2500000003 HC RX 250 WO HCPCS: Performed by: EMERGENCY MEDICINE

## 2024-09-07 PROCEDURE — 80048 BASIC METABOLIC PNL TOTAL CA: CPT

## 2024-09-07 PROCEDURE — 93005 ELECTROCARDIOGRAM TRACING: CPT | Performed by: EMERGENCY MEDICINE

## 2024-09-07 PROCEDURE — 93010 ELECTROCARDIOGRAM REPORT: CPT | Performed by: INTERNAL MEDICINE

## 2024-09-07 PROCEDURE — 36415 COLL VENOUS BLD VENIPUNCTURE: CPT

## 2024-09-07 PROCEDURE — 6370000000 HC RX 637 (ALT 250 FOR IP): Performed by: EMERGENCY MEDICINE

## 2024-09-07 RX ORDER — METOPROLOL TARTRATE 50 MG
50 TABLET ORAL ONCE
Status: COMPLETED | OUTPATIENT
Start: 2024-09-07 | End: 2024-09-07

## 2024-09-07 RX ORDER — TRANEXAMIC ACID 100 MG/ML
100 INJECTION, SOLUTION INTRAVENOUS ONCE
Status: COMPLETED | OUTPATIENT
Start: 2024-09-07 | End: 2024-09-07

## 2024-09-07 RX ORDER — METOPROLOL TARTRATE 50 MG
50 TABLET ORAL 2 TIMES DAILY
Qty: 60 TABLET | Refills: 0 | Status: SHIPPED | OUTPATIENT
Start: 2024-09-07

## 2024-09-07 RX ADMIN — PHENYLEPHRINE HYDROCHLORIDE 2 SPRAY: 0.5 SPRAY NASAL at 17:56

## 2024-09-07 RX ADMIN — TRANEXAMIC ACID 100 MG: 100 INJECTION, SOLUTION INTRAVENOUS at 19:45

## 2024-09-07 RX ADMIN — METOPROLOL TARTRATE 50 MG: 50 TABLET, FILM COATED ORAL at 18:42

## 2024-09-07 RX ADMIN — METOPROLOL TARTRATE 50 MG: 50 TABLET, FILM COATED ORAL at 21:29

## 2024-09-07 RX ADMIN — BENZOCAINE, BUTAMBEN, AND TETRACAINE HYDROCHLORIDE 2 SPRAY: .028; .004; .004 AEROSOL, SPRAY TOPICAL at 19:45

## 2024-09-07 ASSESSMENT — PAIN - FUNCTIONAL ASSESSMENT: PAIN_FUNCTIONAL_ASSESSMENT: 0-10

## 2024-09-07 ASSESSMENT — PAIN SCALES - GENERAL: PAINLEVEL_OUTOF10: 0

## 2024-09-07 NOTE — ED NOTES
Dr Dominguez notified of patients nose bleed restarting after medication and nose clamp applied.

## 2024-09-07 NOTE — ED TRIAGE NOTES
Patient to ER due to nose bleed. Patient has been on eliquis since march when she found out she had afib. Patient also had been on toprol but took last on the 3rd. Patient states she had an ablation due to afib and aflutter. Patient HR noted to be in the 150's. EKG completed. Dr. Dominguez notified.  Patient bleeding controled at this time, patient came with nose packed and noted to be dry at nasal opening.

## 2024-09-08 NOTE — ED PROVIDER NOTES
[facility] EMERGENCY DEPT      EMERGENCY MEDICINE     Room # 40/040A    Pt Name: Kirstin Estrada  MRN: [id number]  Birthdate 1945  Date of evaluation: 9/7/2024  Provider: James Dominguez MD    CHIEF COMPLAINT       Chief Complaint   Patient presents with    Epistaxis     HISTORY OF PRESENT ILLNESS   Kirstin Estrada is a pleasant 79 y.o. female who presents to the emergency department from from home, by private vehicle for evaluation of nosebleeding this morning.  Nosebleeding started when the patient has been sneezing at because of allergy however the nosebleeding resolved.  The patient went grocery and when she came back about 4 PM when she started again to have recurrence of her right nose bleeding and has not stopped since then.  The patient been on Eliquis for atrial fibrillation that she had an ablation that was done by Dr. Kuhn.  Patient subsequently was weaned off of metoprolol initially is 50 mg twice daily and then 25 mg twice daily and was discontinued last intake was September 3, 2024.  Patient denies any shortness of breath or chest pain or nausea or vomiting.  Patient has not been checking her blood pressure.    PASTMEDICAL HISTORY     Past Medical History:   Diagnosis Date    Atrial fibrillation (HCC)        Patient Active Problem List   Diagnosis Code    New onset atrial fibrillation (HCC) I48.91    History of essential hypertension Z86.79    S/P TKR (total knee replacement), left Z96.652    Chronic hyponatremia E87.1    Elevated liver enzymes R74.8    Leukocytosis D72.829    Atrial fibrillation with rapid ventricular response (HCC) I48.91    S/P total knee arthroplasty, left Z96.652    History of total knee arthroplasty, left Z96.652    Afib (HCC) I48.91    Atrial flutter (HCC) I48.92     SURGICAL HISTORY       Past Surgical History:   Procedure Laterality Date    EP DEVICE PROCEDURE N/A 5/21/2024    Ablation A-fib w complete ep study performed by Steve Kuhn MD at [facility] CARDIAC CATH

## 2024-09-09 ENCOUNTER — TELEPHONE (OUTPATIENT)
Dept: ENT CLINIC | Age: 79
End: 2024-09-09

## 2024-09-17 ENCOUNTER — OFFICE VISIT (OUTPATIENT)
Dept: CARDIOLOGY CLINIC | Age: 79
End: 2024-09-17
Payer: MEDICARE

## 2024-09-17 VITALS
WEIGHT: 133.2 LBS | HEART RATE: 78 BPM | HEIGHT: 63 IN | SYSTOLIC BLOOD PRESSURE: 133 MMHG | DIASTOLIC BLOOD PRESSURE: 77 MMHG | BODY MASS INDEX: 23.6 KG/M2

## 2024-09-17 DIAGNOSIS — I48.0 PAROXYSMAL ATRIAL FIBRILLATION (HCC): Primary | ICD-10-CM

## 2024-09-17 DIAGNOSIS — Z98.890 H/O CARDIAC RADIOFREQUENCY ABLATION: ICD-10-CM

## 2024-09-17 PROCEDURE — 93000 ELECTROCARDIOGRAM COMPLETE: CPT | Performed by: PHYSICIAN ASSISTANT

## 2024-09-17 PROCEDURE — 99214 OFFICE O/P EST MOD 30 MIN: CPT | Performed by: PHYSICIAN ASSISTANT

## 2024-09-17 PROCEDURE — 1123F ACP DISCUSS/DSCN MKR DOCD: CPT | Performed by: PHYSICIAN ASSISTANT

## 2024-09-17 RX ORDER — METOPROLOL TARTRATE 25 MG/1
25 TABLET, FILM COATED ORAL 2 TIMES DAILY
Qty: 180 TABLET | Refills: 3 | Status: ON HOLD | OUTPATIENT
Start: 2024-09-17 | End: 2024-09-22 | Stop reason: HOSPADM

## 2024-09-20 ENCOUNTER — APPOINTMENT (OUTPATIENT)
Dept: GENERAL RADIOLOGY | Age: 79
DRG: 308 | End: 2024-09-20
Payer: MEDICARE

## 2024-09-20 ENCOUNTER — APPOINTMENT (OUTPATIENT)
Dept: ULTRASOUND IMAGING | Age: 79
DRG: 308 | End: 2024-09-20
Payer: MEDICARE

## 2024-09-20 ENCOUNTER — HOSPITAL ENCOUNTER (INPATIENT)
Age: 79
LOS: 2 days | Discharge: HOME | DRG: 308 | End: 2024-09-22
Attending: EMERGENCY MEDICINE | Admitting: HOSPITALIST
Payer: MEDICARE

## 2024-09-20 ENCOUNTER — APPOINTMENT (OUTPATIENT)
Age: 79
DRG: 308 | End: 2024-09-20
Attending: HOSPITALIST
Payer: MEDICARE

## 2024-09-20 ENCOUNTER — TELEPHONE (OUTPATIENT)
Dept: CARDIOLOGY CLINIC | Age: 79
End: 2024-09-20

## 2024-09-20 DIAGNOSIS — I48.0 PAROXYSMAL ATRIAL FIBRILLATION (HCC): ICD-10-CM

## 2024-09-20 DIAGNOSIS — R79.89 ELEVATED LFTS: ICD-10-CM

## 2024-09-20 DIAGNOSIS — R79.89 ELEVATED TROPONIN: ICD-10-CM

## 2024-09-20 DIAGNOSIS — I48.91 ATRIAL FIBRILLATION WITH RAPID VENTRICULAR RESPONSE (HCC): Primary | ICD-10-CM

## 2024-09-20 PROBLEM — I47.10 SVT (SUPRAVENTRICULAR TACHYCARDIA) (HCC): Status: ACTIVE | Noted: 2024-09-20

## 2024-09-20 LAB
ALBUMIN SERPL BCG-MCNC: 4 G/DL (ref 3.5–5.1)
ALP SERPL-CCNC: 175 U/L (ref 38–126)
ALT SERPL W/O P-5'-P-CCNC: 371 U/L (ref 11–66)
AMPHETAMINES UR QL SCN: NEGATIVE
ANION GAP SERPL CALC-SCNC: 16 MEQ/L (ref 8–16)
AST SERPL-CCNC: 332 U/L (ref 5–40)
BARBITURATES UR QL SCN: NEGATIVE
BASOPHILS ABSOLUTE: 0 THOU/MM3 (ref 0–0.1)
BASOPHILS NFR BLD AUTO: 0.5 %
BENZODIAZ UR QL SCN: NEGATIVE
BILIRUB SERPL-MCNC: 0.5 MG/DL (ref 0.3–1.2)
BILIRUB UR QL STRIP.AUTO: NEGATIVE
BUN SERPL-MCNC: 31 MG/DL (ref 7–22)
BZE UR QL SCN: NEGATIVE
CALCIUM SERPL-MCNC: 9.3 MG/DL (ref 8.5–10.5)
CANNABINOIDS UR QL SCN: NEGATIVE
CHARACTER UR: CLEAR
CHLORIDE SERPL-SCNC: 94 MEQ/L (ref 98–111)
CO2 SERPL-SCNC: 22 MEQ/L (ref 23–33)
COLOR, UA: YELLOW
CREAT SERPL-MCNC: 0.9 MG/DL (ref 0.4–1.2)
DEPRECATED RDW RBC AUTO: 47.4 FL (ref 35–45)
ECHO AO ASC DIAM: 2.6 CM
ECHO AO ASCENDING AORTA INDEX: 1.6 CM/M2
ECHO AV CUSP MM: 1.6 CM
ECHO BSA: 1.64 M2
ECHO IVC PROX: 2.3 CM
ECHO LA AREA 2C: 17.2 CM2
ECHO LA AREA 4C: 17.7 CM2
ECHO LA DIAMETER INDEX: 1.9 CM/M2
ECHO LA DIAMETER: 3.1 CM
ECHO LA MAJOR AXIS: 5.5 CM
ECHO LA MINOR AXIS: 5.2 CM
ECHO LA VOL BP: 47 ML (ref 22–52)
ECHO LA VOL MOD A2C: 46 ML (ref 22–52)
ECHO LA VOL MOD A4C: 47 ML (ref 22–52)
ECHO LA VOL/BSA BIPLANE: 29 ML/M2 (ref 16–34)
ECHO LA VOLUME INDEX MOD A2C: 28 ML/M2 (ref 16–34)
ECHO LA VOLUME INDEX MOD A4C: 29 ML/M2 (ref 16–34)
ECHO LV EJECTION FRACTION BIPLANE: 56 % (ref 55–100)
ECHO LV FRACTIONAL SHORTENING: 29 % (ref 28–44)
ECHO LV INTERNAL DIMENSION DIASTOLE INDEX: 2.15 CM/M2
ECHO LV INTERNAL DIMENSION DIASTOLIC: 3.5 CM (ref 3.9–5.3)
ECHO LV INTERNAL DIMENSION SYSTOLIC INDEX: 1.53 CM/M2
ECHO LV INTERNAL DIMENSION SYSTOLIC: 2.5 CM
ECHO LV IVSD: 0.8 CM (ref 0.6–0.9)
ECHO LV MASS 2D: 75.3 G (ref 67–162)
ECHO LV MASS INDEX 2D: 46.2 G/M2 (ref 43–95)
ECHO LV POSTERIOR WALL DIASTOLIC: 0.8 CM (ref 0.6–0.9)
ECHO LV RELATIVE WALL THICKNESS RATIO: 0.46
ECHO RV INTERNAL DIMENSION: 2.9 CM
ECHO RV TAPSE: 2 CM (ref 1.7–?)
EKG Q-T INTERVAL: 272 MS
EKG Q-T INTERVAL: 348 MS
EKG QRS DURATION: 64 MS
EKG QRS DURATION: 70 MS
EKG QTC CALCULATION (BAZETT): 458 MS
EKG QTC CALCULATION (BAZETT): 485 MS
EKG R AXIS: 33 DEGREES
EKG R AXIS: 81 DEGREES
EKG T AXIS: -63 DEGREES
EKG T AXIS: -66 DEGREES
EKG VENTRICULAR RATE: 117 BPM
EKG VENTRICULAR RATE: 171 BPM
EOSINOPHIL NFR BLD AUTO: 0.3 %
EOSINOPHILS ABSOLUTE: 0 THOU/MM3 (ref 0–0.4)
ERYTHROCYTE [DISTWIDTH] IN BLOOD BY AUTOMATED COUNT: 13.6 % (ref 11.5–14.5)
FENTANYL: NEGATIVE
GFR SERPL CREATININE-BSD FRML MDRD: 65 ML/MIN/1.73M2
GLUCOSE SERPL-MCNC: 115 MG/DL (ref 70–108)
GLUCOSE UR QL STRIP.AUTO: NEGATIVE MG/DL
HCT VFR BLD AUTO: 37.1 % (ref 37–47)
HGB BLD-MCNC: 11.9 GM/DL (ref 12–16)
HGB UR QL STRIP.AUTO: NEGATIVE
IMM GRANULOCYTES # BLD AUTO: 0.03 THOU/MM3 (ref 0–0.07)
IMM GRANULOCYTES NFR BLD AUTO: 0.4 %
KETONES UR QL STRIP.AUTO: ABNORMAL
LYMPHOCYTES ABSOLUTE: 1.5 THOU/MM3 (ref 1–4.8)
LYMPHOCYTES NFR BLD AUTO: 19.7 %
MCH RBC QN AUTO: 30.4 PG (ref 26–33)
MCHC RBC AUTO-ENTMCNC: 32.1 GM/DL (ref 32.2–35.5)
MCV RBC AUTO: 94.9 FL (ref 81–99)
MONOCYTES ABSOLUTE: 0.3 THOU/MM3 (ref 0.4–1.3)
MONOCYTES NFR BLD AUTO: 4 %
NEUTROPHILS ABSOLUTE: 5.6 THOU/MM3 (ref 1.8–7.7)
NEUTROPHILS NFR BLD AUTO: 75.1 %
NITRITE UR QL STRIP: NEGATIVE
NRBC BLD AUTO-RTO: 0 /100 WBC
OPIATES UR QL SCN: NEGATIVE
OSMOLALITY SERPL CALC.SUM OF ELEC: 272 MOSMOL/KG (ref 275–300)
OXYCODONE: NEGATIVE
PCP UR QL SCN: NEGATIVE
PH UR STRIP.AUTO: 6.5 [PH] (ref 5–9)
PLATELET # BLD AUTO: 246 THOU/MM3 (ref 130–400)
PMV BLD AUTO: 8.8 FL (ref 9.4–12.4)
POTASSIUM SERPL-SCNC: 4.7 MEQ/L (ref 3.5–5.2)
PROT SERPL-MCNC: 6.9 G/DL (ref 6.1–8)
PROT UR STRIP.AUTO-MCNC: NEGATIVE MG/DL
RBC # BLD AUTO: 3.91 MILL/MM3 (ref 4.2–5.4)
SODIUM SERPL-SCNC: 132 MEQ/L (ref 135–145)
SP GR UR REFRACT.AUTO: 1.01 (ref 1–1.03)
T4 FREE SERPL-MCNC: 1.45 NG/DL (ref 0.93–1.68)
TROPONIN, HIGH SENSITIVITY: 58 NG/L (ref 0–12)
TROPONIN, HIGH SENSITIVITY: 70 NG/L (ref 0–12)
TSH SERPL DL<=0.005 MIU/L-ACNC: 3.94 UIU/ML (ref 0.4–4.2)
TSH SERPL DL<=0.005 MIU/L-ACNC: 5.55 UIU/ML (ref 0.4–4.2)
UROBILINOGEN, URINE: 0.2 EU/DL (ref 0–1)
WBC # BLD AUTO: 7.5 THOU/MM3 (ref 4.8–10.8)
WBC #/AREA URNS HPF: NEGATIVE /[HPF]

## 2024-09-20 PROCEDURE — 96376 TX/PRO/DX INJ SAME DRUG ADON: CPT

## 2024-09-20 PROCEDURE — 71045 X-RAY EXAM CHEST 1 VIEW: CPT

## 2024-09-20 PROCEDURE — 84443 ASSAY THYROID STIM HORMONE: CPT

## 2024-09-20 PROCEDURE — 96361 HYDRATE IV INFUSION ADD-ON: CPT

## 2024-09-20 PROCEDURE — 80307 DRUG TEST PRSMV CHEM ANLYZR: CPT

## 2024-09-20 PROCEDURE — 6360000002 HC RX W HCPCS

## 2024-09-20 PROCEDURE — 93307 TTE W/O DOPPLER COMPLETE: CPT

## 2024-09-20 PROCEDURE — 80053 COMPREHEN METABOLIC PANEL: CPT

## 2024-09-20 PROCEDURE — 99285 EMERGENCY DEPT VISIT HI MDM: CPT

## 2024-09-20 PROCEDURE — 93010 ELECTROCARDIOGRAM REPORT: CPT | Performed by: INTERNAL MEDICINE

## 2024-09-20 PROCEDURE — 81003 URINALYSIS AUTO W/O SCOPE: CPT

## 2024-09-20 PROCEDURE — 96365 THER/PROPH/DIAG IV INF INIT: CPT

## 2024-09-20 PROCEDURE — 2580000003 HC RX 258

## 2024-09-20 PROCEDURE — 99223 1ST HOSP IP/OBS HIGH 75: CPT | Performed by: HOSPITALIST

## 2024-09-20 PROCEDURE — 84484 ASSAY OF TROPONIN QUANT: CPT

## 2024-09-20 PROCEDURE — 6370000000 HC RX 637 (ALT 250 FOR IP): Performed by: HOSPITALIST

## 2024-09-20 PROCEDURE — 93005 ELECTROCARDIOGRAM TRACING: CPT | Performed by: HOSPITALIST

## 2024-09-20 PROCEDURE — 93307 TTE W/O DOPPLER COMPLETE: CPT | Performed by: INTERNAL MEDICINE

## 2024-09-20 PROCEDURE — 93005 ELECTROCARDIOGRAM TRACING: CPT

## 2024-09-20 PROCEDURE — 2140000000 HC CCU INTERMEDIATE R&B

## 2024-09-20 PROCEDURE — 96375 TX/PRO/DX INJ NEW DRUG ADDON: CPT

## 2024-09-20 PROCEDURE — 2500000003 HC RX 250 WO HCPCS: Performed by: EMERGENCY MEDICINE

## 2024-09-20 PROCEDURE — 2500000003 HC RX 250 WO HCPCS

## 2024-09-20 PROCEDURE — 85025 COMPLETE CBC W/AUTO DIFF WBC: CPT

## 2024-09-20 PROCEDURE — 2580000003 HC RX 258: Performed by: EMERGENCY MEDICINE

## 2024-09-20 PROCEDURE — 36415 COLL VENOUS BLD VENIPUNCTURE: CPT

## 2024-09-20 PROCEDURE — 76705 ECHO EXAM OF ABDOMEN: CPT

## 2024-09-20 PROCEDURE — 84439 ASSAY OF FREE THYROXINE: CPT

## 2024-09-20 PROCEDURE — 93005 ELECTROCARDIOGRAM TRACING: CPT | Performed by: EMERGENCY MEDICINE

## 2024-09-20 RX ORDER — SODIUM CHLORIDE 9 MG/ML
INJECTION, SOLUTION INTRAVENOUS PRN
Status: DISCONTINUED | OUTPATIENT
Start: 2024-09-20 | End: 2024-09-22 | Stop reason: HOSPADM

## 2024-09-20 RX ORDER — ADENOSINE 3 MG/ML
INJECTION, SOLUTION INTRAVENOUS
Status: COMPLETED
Start: 2024-09-20 | End: 2024-09-20

## 2024-09-20 RX ORDER — MAGNESIUM SULFATE IN WATER 40 MG/ML
2000 INJECTION, SOLUTION INTRAVENOUS PRN
Status: DISCONTINUED | OUTPATIENT
Start: 2024-09-20 | End: 2024-09-22 | Stop reason: HOSPADM

## 2024-09-20 RX ORDER — METOPROLOL TARTRATE 25 MG/1
25 TABLET, FILM COATED ORAL 2 TIMES DAILY
Status: DISCONTINUED | OUTPATIENT
Start: 2024-09-20 | End: 2024-09-21

## 2024-09-20 RX ORDER — SODIUM CHLORIDE 0.9 % (FLUSH) 0.9 %
5-40 SYRINGE (ML) INJECTION PRN
Status: DISCONTINUED | OUTPATIENT
Start: 2024-09-20 | End: 2024-09-22 | Stop reason: HOSPADM

## 2024-09-20 RX ORDER — ACETAMINOPHEN 650 MG/1
650 SUPPOSITORY RECTAL EVERY 6 HOURS PRN
Status: DISCONTINUED | OUTPATIENT
Start: 2024-09-20 | End: 2024-09-22 | Stop reason: HOSPADM

## 2024-09-20 RX ORDER — POLYETHYLENE GLYCOL 3350 17 G/17G
17 POWDER, FOR SOLUTION ORAL DAILY PRN
Status: DISCONTINUED | OUTPATIENT
Start: 2024-09-20 | End: 2024-09-22 | Stop reason: HOSPADM

## 2024-09-20 RX ORDER — ACETAMINOPHEN 325 MG/1
650 TABLET ORAL EVERY 6 HOURS PRN
Status: DISCONTINUED | OUTPATIENT
Start: 2024-09-20 | End: 2024-09-22 | Stop reason: HOSPADM

## 2024-09-20 RX ORDER — DILTIAZEM HYDROCHLORIDE 5 MG/ML
15 INJECTION INTRAVENOUS ONCE
Status: COMPLETED | OUTPATIENT
Start: 2024-09-20 | End: 2024-09-20

## 2024-09-20 RX ORDER — ADENOSINE 3 MG/ML
12 INJECTION, SOLUTION INTRAVENOUS ONCE
Status: COMPLETED | OUTPATIENT
Start: 2024-09-20 | End: 2024-09-20

## 2024-09-20 RX ORDER — ADENOSINE 3 MG/ML
6 INJECTION, SOLUTION INTRAVENOUS ONCE
Status: COMPLETED | OUTPATIENT
Start: 2024-09-20 | End: 2024-09-20

## 2024-09-20 RX ORDER — ONDANSETRON 2 MG/ML
4 INJECTION INTRAMUSCULAR; INTRAVENOUS EVERY 6 HOURS PRN
Status: DISCONTINUED | OUTPATIENT
Start: 2024-09-20 | End: 2024-09-22 | Stop reason: HOSPADM

## 2024-09-20 RX ORDER — DILTIAZEM HYDROCHLORIDE 5 MG/ML
INJECTION INTRAVENOUS
Status: DISPENSED
Start: 2024-09-20 | End: 2024-09-20

## 2024-09-20 RX ORDER — POTASSIUM CHLORIDE 1500 MG/1
40 TABLET, EXTENDED RELEASE ORAL PRN
Status: DISCONTINUED | OUTPATIENT
Start: 2024-09-20 | End: 2024-09-22 | Stop reason: HOSPADM

## 2024-09-20 RX ORDER — SODIUM CHLORIDE 0.9 % (FLUSH) 0.9 %
5-40 SYRINGE (ML) INJECTION EVERY 12 HOURS SCHEDULED
Status: DISCONTINUED | OUTPATIENT
Start: 2024-09-20 | End: 2024-09-22 | Stop reason: HOSPADM

## 2024-09-20 RX ORDER — 0.9 % SODIUM CHLORIDE 0.9 %
1000 INTRAVENOUS SOLUTION INTRAVENOUS ONCE
Status: COMPLETED | OUTPATIENT
Start: 2024-09-20 | End: 2024-09-20

## 2024-09-20 RX ORDER — POTASSIUM CHLORIDE 7.45 MG/ML
10 INJECTION INTRAVENOUS PRN
Status: DISCONTINUED | OUTPATIENT
Start: 2024-09-20 | End: 2024-09-22 | Stop reason: HOSPADM

## 2024-09-20 RX ORDER — ONDANSETRON 4 MG/1
4 TABLET, ORALLY DISINTEGRATING ORAL EVERY 8 HOURS PRN
Status: DISCONTINUED | OUTPATIENT
Start: 2024-09-20 | End: 2024-09-22 | Stop reason: HOSPADM

## 2024-09-20 RX ORDER — DILTIAZEM HYDROCHLORIDE 5 MG/ML
INJECTION INTRAVENOUS
Status: DISCONTINUED
Start: 2024-09-20 | End: 2024-09-20 | Stop reason: WASHOUT

## 2024-09-20 RX ADMIN — ADENOSINE 12 MG: 3 INJECTION, SOLUTION INTRAVENOUS at 08:17

## 2024-09-20 RX ADMIN — DILTIAZEM HYDROCHLORIDE 15 MG: 5 INJECTION INTRAVENOUS at 08:45

## 2024-09-20 RX ADMIN — ADENOSINE 6 MG: 3 INJECTION, SOLUTION INTRAVENOUS at 08:15

## 2024-09-20 RX ADMIN — APIXABAN 5 MG: 5 TABLET, FILM COATED ORAL at 23:04

## 2024-09-20 RX ADMIN — APIXABAN 5 MG: 5 TABLET, FILM COATED ORAL at 15:16

## 2024-09-20 RX ADMIN — DILTIAZEM HYDROCHLORIDE 15 MG: 5 INJECTION, SOLUTION INTRAVENOUS at 08:45

## 2024-09-20 RX ADMIN — DILTIAZEM HYDROCHLORIDE 5 MG/HR: 5 INJECTION, SOLUTION INTRAVENOUS at 11:53

## 2024-09-20 RX ADMIN — SODIUM CHLORIDE 1000 ML: 9 INJECTION, SOLUTION INTRAVENOUS at 08:12

## 2024-09-20 RX ADMIN — METOPROLOL TARTRATE 25 MG: 25 TABLET, FILM COATED ORAL at 14:25

## 2024-09-20 ASSESSMENT — LIFESTYLE VARIABLES
HOW MANY STANDARD DRINKS CONTAINING ALCOHOL DO YOU HAVE ON A TYPICAL DAY: PATIENT DOES NOT DRINK
HOW OFTEN DO YOU HAVE A DRINK CONTAINING ALCOHOL: NEVER

## 2024-09-20 ASSESSMENT — PAIN - FUNCTIONAL ASSESSMENT
PAIN_FUNCTIONAL_ASSESSMENT: NONE - DENIES PAIN
PAIN_FUNCTIONAL_ASSESSMENT: NONE - DENIES PAIN
PAIN_FUNCTIONAL_ASSESSMENT: 0-10

## 2024-09-20 ASSESSMENT — PAIN SCALES - GENERAL: PAINLEVEL_OUTOF10: 8

## 2024-09-20 ASSESSMENT — PAIN DESCRIPTION - LOCATION
LOCATION: BACK
LOCATION: BACK;JAW

## 2024-09-20 ASSESSMENT — PAIN DESCRIPTION - ORIENTATION: ORIENTATION: LEFT

## 2024-09-20 ASSESSMENT — PAIN DESCRIPTION - PAIN TYPE: TYPE: ACUTE PAIN

## 2024-09-21 PROBLEM — R79.89 ELEVATED LFTS: Status: ACTIVE | Noted: 2024-09-21

## 2024-09-21 LAB
ANION GAP SERPL CALC-SCNC: 11 MEQ/L (ref 8–16)
BASOPHILS ABSOLUTE: 0.1 THOU/MM3 (ref 0–0.1)
BASOPHILS NFR BLD AUTO: 0.8 %
BUN SERPL-MCNC: 20 MG/DL (ref 7–22)
CALCIUM SERPL-MCNC: 8.7 MG/DL (ref 8.5–10.5)
CHLORIDE SERPL-SCNC: 107 MEQ/L (ref 98–111)
CO2 SERPL-SCNC: 22 MEQ/L (ref 23–33)
CREAT SERPL-MCNC: 0.7 MG/DL (ref 0.4–1.2)
DEPRECATED RDW RBC AUTO: 48.6 FL (ref 35–45)
EKG ATRIAL RATE: 90 BPM
EKG P AXIS: 83 DEGREES
EKG P-R INTERVAL: 160 MS
EKG Q-T INTERVAL: 270 MS
EKG Q-T INTERVAL: 378 MS
EKG QRS DURATION: 66 MS
EKG QRS DURATION: 70 MS
EKG QTC CALCULATION (BAZETT): 459 MS
EKG QTC CALCULATION (BAZETT): 462 MS
EKG R AXIS: 57 DEGREES
EKG R AXIS: 63 DEGREES
EKG T AXIS: -59 DEGREES
EKG T AXIS: 34 DEGREES
EKG VENTRICULAR RATE: 174 BPM
EKG VENTRICULAR RATE: 90 BPM
EOSINOPHIL NFR BLD AUTO: 2 %
EOSINOPHILS ABSOLUTE: 0.1 THOU/MM3 (ref 0–0.4)
ERYTHROCYTE [DISTWIDTH] IN BLOOD BY AUTOMATED COUNT: 13.8 % (ref 11.5–14.5)
GFR SERPL CREATININE-BSD FRML MDRD: 88 ML/MIN/1.73M2
GLUCOSE SERPL-MCNC: 89 MG/DL (ref 70–108)
HAV IGM SER QL: NEGATIVE
HBV CORE IGM SERPL QL IA: NEGATIVE
HBV SURFACE AG SERPL QL IA: NEGATIVE
HCT VFR BLD AUTO: 32.2 % (ref 37–47)
HCV IGG SERPL QL IA: NEGATIVE
HGB BLD-MCNC: 10.3 GM/DL (ref 12–16)
IMM GRANULOCYTES # BLD AUTO: 0.02 THOU/MM3 (ref 0–0.07)
IMM GRANULOCYTES NFR BLD AUTO: 0.3 %
LYMPHOCYTES ABSOLUTE: 1.6 THOU/MM3 (ref 1–4.8)
LYMPHOCYTES NFR BLD AUTO: 25.3 %
MCH RBC QN AUTO: 30.6 PG (ref 26–33)
MCHC RBC AUTO-ENTMCNC: 32 GM/DL (ref 32.2–35.5)
MCV RBC AUTO: 95.5 FL (ref 81–99)
MONOCYTES ABSOLUTE: 0.5 THOU/MM3 (ref 0.4–1.3)
MONOCYTES NFR BLD AUTO: 8.1 %
NEUTROPHILS ABSOLUTE: 4.1 THOU/MM3 (ref 1.8–7.7)
NEUTROPHILS NFR BLD AUTO: 63.5 %
NRBC BLD AUTO-RTO: 0 /100 WBC
NT-PROBNP SERPL IA-MCNC: 5785 PG/ML (ref 0–449)
PLATELET # BLD AUTO: 205 THOU/MM3 (ref 130–400)
PMV BLD AUTO: 8.7 FL (ref 9.4–12.4)
POTASSIUM SERPL-SCNC: 3.9 MEQ/L (ref 3.5–5.2)
RBC # BLD AUTO: 3.37 MILL/MM3 (ref 4.2–5.4)
SODIUM SERPL-SCNC: 140 MEQ/L (ref 135–145)
WBC # BLD AUTO: 6.4 THOU/MM3 (ref 4.8–10.8)

## 2024-09-21 PROCEDURE — 6370000000 HC RX 637 (ALT 250 FOR IP): Performed by: INTERNAL MEDICINE

## 2024-09-21 PROCEDURE — 6370000000 HC RX 637 (ALT 250 FOR IP): Performed by: HOSPITALIST

## 2024-09-21 PROCEDURE — 6370000000 HC RX 637 (ALT 250 FOR IP)

## 2024-09-21 PROCEDURE — 85025 COMPLETE CBC W/AUTO DIFF WBC: CPT

## 2024-09-21 PROCEDURE — 80048 BASIC METABOLIC PNL TOTAL CA: CPT

## 2024-09-21 PROCEDURE — 80074 ACUTE HEPATITIS PANEL: CPT

## 2024-09-21 PROCEDURE — 2140000000 HC CCU INTERMEDIATE R&B

## 2024-09-21 PROCEDURE — 2580000003 HC RX 258: Performed by: HOSPITALIST

## 2024-09-21 PROCEDURE — 2500000003 HC RX 250 WO HCPCS: Performed by: EMERGENCY MEDICINE

## 2024-09-21 PROCEDURE — 83880 ASSAY OF NATRIURETIC PEPTIDE: CPT

## 2024-09-21 PROCEDURE — 93010 ELECTROCARDIOGRAM REPORT: CPT | Performed by: INTERNAL MEDICINE

## 2024-09-21 PROCEDURE — 99223 1ST HOSP IP/OBS HIGH 75: CPT | Performed by: INTERNAL MEDICINE

## 2024-09-21 PROCEDURE — 2580000003 HC RX 258: Performed by: EMERGENCY MEDICINE

## 2024-09-21 PROCEDURE — 36415 COLL VENOUS BLD VENIPUNCTURE: CPT

## 2024-09-21 PROCEDURE — 99232 SBSQ HOSP IP/OBS MODERATE 35: CPT | Performed by: INTERNAL MEDICINE

## 2024-09-21 PROCEDURE — 6360000002 HC RX W HCPCS: Performed by: INTERNAL MEDICINE

## 2024-09-21 RX ORDER — METOPROLOL TARTRATE 25 MG/1
37.5 TABLET, FILM COATED ORAL 2 TIMES DAILY
Status: DISCONTINUED | OUTPATIENT
Start: 2024-09-21 | End: 2024-09-22 | Stop reason: HOSPADM

## 2024-09-21 RX ORDER — FUROSEMIDE 10 MG/ML
40 INJECTION INTRAMUSCULAR; INTRAVENOUS ONCE
Status: COMPLETED | OUTPATIENT
Start: 2024-09-21 | End: 2024-09-21

## 2024-09-21 RX ADMIN — APIXABAN 5 MG: 5 TABLET, FILM COATED ORAL at 19:44

## 2024-09-21 RX ADMIN — METOPROLOL TARTRATE 25 MG: 25 TABLET, FILM COATED ORAL at 02:16

## 2024-09-21 RX ADMIN — SODIUM CHLORIDE, PRESERVATIVE FREE 10 ML: 5 INJECTION INTRAVENOUS at 09:05

## 2024-09-21 RX ADMIN — METOPROLOL TARTRATE 25 MG: 25 TABLET, FILM COATED ORAL at 09:06

## 2024-09-21 RX ADMIN — DILTIAZEM HYDROCHLORIDE 5 MG/HR: 5 INJECTION, SOLUTION INTRAVENOUS at 01:21

## 2024-09-21 RX ADMIN — FUROSEMIDE 40 MG: 10 INJECTION, SOLUTION INTRAMUSCULAR; INTRAVENOUS at 10:57

## 2024-09-21 RX ADMIN — METOPROLOL TARTRATE 37.5 MG: 25 TABLET, FILM COATED ORAL at 19:44

## 2024-09-21 RX ADMIN — APIXABAN 5 MG: 5 TABLET, FILM COATED ORAL at 09:06

## 2024-09-21 RX ADMIN — SODIUM CHLORIDE, PRESERVATIVE FREE 10 ML: 5 INJECTION INTRAVENOUS at 19:44

## 2024-09-22 VITALS
HEART RATE: 83 BPM | DIASTOLIC BLOOD PRESSURE: 74 MMHG | WEIGHT: 133 LBS | BODY MASS INDEX: 23.57 KG/M2 | SYSTOLIC BLOOD PRESSURE: 135 MMHG | OXYGEN SATURATION: 100 % | HEIGHT: 63 IN | RESPIRATION RATE: 14 BRPM | TEMPERATURE: 97.6 F

## 2024-09-22 PROBLEM — I47.10 SVT (SUPRAVENTRICULAR TACHYCARDIA) (HCC): Status: RESOLVED | Noted: 2024-09-20 | Resolved: 2024-09-22

## 2024-09-22 PROBLEM — R79.89 ELEVATED LFTS: Status: RESOLVED | Noted: 2024-09-21 | Resolved: 2024-09-22

## 2024-09-22 LAB
ALBUMIN SERPL BCG-MCNC: 3.5 G/DL (ref 3.5–5.1)
ALP SERPL-CCNC: 113 U/L (ref 38–126)
ALT SERPL W/O P-5'-P-CCNC: 189 U/L (ref 11–66)
ANION GAP SERPL CALC-SCNC: 12 MEQ/L (ref 8–16)
AST SERPL-CCNC: 103 U/L (ref 5–40)
BASOPHILS ABSOLUTE: 0 THOU/MM3 (ref 0–0.1)
BASOPHILS NFR BLD AUTO: 0.8 %
BILIRUB CONJ SERPL-MCNC: < 0.1 MG/DL (ref 0.1–13.8)
BILIRUB SERPL-MCNC: 0.4 MG/DL (ref 0.3–1.2)
BUN SERPL-MCNC: 16 MG/DL (ref 7–22)
CALCIUM SERPL-MCNC: 9.1 MG/DL (ref 8.5–10.5)
CHLORIDE SERPL-SCNC: 101 MEQ/L (ref 98–111)
CO2 SERPL-SCNC: 26 MEQ/L (ref 23–33)
CREAT SERPL-MCNC: 0.8 MG/DL (ref 0.4–1.2)
DEPRECATED RDW RBC AUTO: 46.8 FL (ref 35–45)
EOSINOPHIL NFR BLD AUTO: 3.6 %
EOSINOPHILS ABSOLUTE: 0.2 THOU/MM3 (ref 0–0.4)
ERYTHROCYTE [DISTWIDTH] IN BLOOD BY AUTOMATED COUNT: 13.6 % (ref 11.5–14.5)
GFR SERPL CREATININE-BSD FRML MDRD: 75 ML/MIN/1.73M2
GLUCOSE SERPL-MCNC: 88 MG/DL (ref 70–108)
HCT VFR BLD AUTO: 34.8 % (ref 37–47)
HGB BLD-MCNC: 11.2 GM/DL (ref 12–16)
IMM GRANULOCYTES # BLD AUTO: 0.02 THOU/MM3 (ref 0–0.07)
IMM GRANULOCYTES NFR BLD AUTO: 0.3 %
LYMPHOCYTES ABSOLUTE: 1.5 THOU/MM3 (ref 1–4.8)
LYMPHOCYTES NFR BLD AUTO: 24.6 %
MCH RBC QN AUTO: 30.5 PG (ref 26–33)
MCHC RBC AUTO-ENTMCNC: 32.2 GM/DL (ref 32.2–35.5)
MCV RBC AUTO: 94.8 FL (ref 81–99)
MONOCYTES ABSOLUTE: 0.5 THOU/MM3 (ref 0.4–1.3)
MONOCYTES NFR BLD AUTO: 9.2 %
NEUTROPHILS ABSOLUTE: 3.6 THOU/MM3 (ref 1.8–7.7)
NEUTROPHILS NFR BLD AUTO: 61.5 %
NRBC BLD AUTO-RTO: 0 /100 WBC
PLATELET # BLD AUTO: 221 THOU/MM3 (ref 130–400)
PMV BLD AUTO: 8.5 FL (ref 9.4–12.4)
POTASSIUM SERPL-SCNC: 4 MEQ/L (ref 3.5–5.2)
PROT SERPL-MCNC: 5.9 G/DL (ref 6.1–8)
RBC # BLD AUTO: 3.67 MILL/MM3 (ref 4.2–5.4)
SODIUM SERPL-SCNC: 139 MEQ/L (ref 135–145)
WBC # BLD AUTO: 5.9 THOU/MM3 (ref 4.8–10.8)

## 2024-09-22 PROCEDURE — 99239 HOSP IP/OBS DSCHRG MGMT >30: CPT | Performed by: INTERNAL MEDICINE

## 2024-09-22 PROCEDURE — 2580000003 HC RX 258: Performed by: HOSPITALIST

## 2024-09-22 PROCEDURE — 80053 COMPREHEN METABOLIC PANEL: CPT

## 2024-09-22 PROCEDURE — 85025 COMPLETE CBC W/AUTO DIFF WBC: CPT

## 2024-09-22 PROCEDURE — 6370000000 HC RX 637 (ALT 250 FOR IP): Performed by: INTERNAL MEDICINE

## 2024-09-22 PROCEDURE — 82248 BILIRUBIN DIRECT: CPT

## 2024-09-22 PROCEDURE — 36415 COLL VENOUS BLD VENIPUNCTURE: CPT

## 2024-09-22 PROCEDURE — 6370000000 HC RX 637 (ALT 250 FOR IP): Performed by: HOSPITALIST

## 2024-09-22 RX ORDER — METOPROLOL TARTRATE 37.5 MG/1
37.5 TABLET, FILM COATED ORAL 2 TIMES DAILY
Qty: 60 TABLET | Refills: 3 | Status: SHIPPED | OUTPATIENT
Start: 2024-09-22

## 2024-09-22 RX ORDER — BUMETANIDE 1 MG/1
1 TABLET ORAL DAILY
Status: DISCONTINUED | OUTPATIENT
Start: 2024-09-22 | End: 2024-09-22 | Stop reason: HOSPADM

## 2024-09-22 RX ORDER — BUMETANIDE 1 MG/1
1 TABLET ORAL DAILY
Qty: 30 TABLET | Refills: 3 | Status: SHIPPED | OUTPATIENT
Start: 2024-09-23

## 2024-09-22 RX ADMIN — METOPROLOL TARTRATE 37.5 MG: 25 TABLET, FILM COATED ORAL at 08:45

## 2024-09-22 RX ADMIN — APIXABAN 5 MG: 5 TABLET, FILM COATED ORAL at 08:48

## 2024-09-22 RX ADMIN — BUMETANIDE 1 MG: 1 TABLET ORAL at 09:15

## 2024-09-22 RX ADMIN — SODIUM CHLORIDE, PRESERVATIVE FREE 10 ML: 5 INJECTION INTRAVENOUS at 08:44

## 2024-09-22 ASSESSMENT — PAIN SCALES - GENERAL: PAINLEVEL_OUTOF10: 0

## 2024-09-24 ENCOUNTER — OFFICE VISIT (OUTPATIENT)
Dept: CARDIOLOGY CLINIC | Age: 79
End: 2024-09-24
Payer: MEDICARE

## 2024-09-24 VITALS
DIASTOLIC BLOOD PRESSURE: 72 MMHG | SYSTOLIC BLOOD PRESSURE: 118 MMHG | HEART RATE: 78 BPM | WEIGHT: 131 LBS | BODY MASS INDEX: 23.21 KG/M2 | HEIGHT: 63 IN

## 2024-09-24 DIAGNOSIS — I48.0 PAROXYSMAL ATRIAL FIBRILLATION (HCC): Primary | ICD-10-CM

## 2024-09-24 DIAGNOSIS — Z98.890 H/O CARDIAC RADIOFREQUENCY ABLATION: ICD-10-CM

## 2024-09-24 DIAGNOSIS — I47.10 PAROXYSMAL SVT (SUPRAVENTRICULAR TACHYCARDIA) (HCC): ICD-10-CM

## 2024-09-24 PROCEDURE — 99214 OFFICE O/P EST MOD 30 MIN: CPT | Performed by: PHYSICIAN ASSISTANT

## 2024-09-24 PROCEDURE — 1123F ACP DISCUSS/DSCN MKR DOCD: CPT | Performed by: PHYSICIAN ASSISTANT

## 2024-09-25 ENCOUNTER — TELEPHONE (OUTPATIENT)
Dept: CARDIOLOGY CLINIC | Age: 79
End: 2024-09-25

## 2024-09-25 RX ORDER — FLECAINIDE ACETATE 50 MG/1
50 TABLET ORAL 2 TIMES DAILY
Qty: 60 TABLET | Refills: 3 | Status: SHIPPED | OUTPATIENT
Start: 2024-09-25

## 2024-10-12 LAB — ECHO BSA: 1.7 M2

## 2024-10-15 ENCOUNTER — NURSE ONLY (OUTPATIENT)
Dept: CARDIOLOGY CLINIC | Age: 79
End: 2024-10-15

## 2024-10-15 VITALS — DIASTOLIC BLOOD PRESSURE: 72 MMHG | SYSTOLIC BLOOD PRESSURE: 116 MMHG | HEART RATE: 65 BPM

## 2024-10-15 DIAGNOSIS — I48.0 PAROXYSMAL ATRIAL FIBRILLATION (HCC): ICD-10-CM

## 2024-10-15 DIAGNOSIS — Z98.890 H/O CARDIAC RADIOFREQUENCY ABLATION: Primary | ICD-10-CM

## 2024-10-15 LAB — ECHO BSA: 1.7 M2

## 2024-12-05 ENCOUNTER — OFFICE VISIT (OUTPATIENT)
Dept: CARDIOLOGY CLINIC | Age: 79
End: 2024-12-05
Payer: MEDICARE

## 2024-12-05 VITALS
SYSTOLIC BLOOD PRESSURE: 124 MMHG | HEIGHT: 63 IN | HEART RATE: 73 BPM | DIASTOLIC BLOOD PRESSURE: 69 MMHG | BODY MASS INDEX: 24.2 KG/M2 | WEIGHT: 136.6 LBS

## 2024-12-05 DIAGNOSIS — I48.0 PAROXYSMAL ATRIAL FIBRILLATION (HCC): Primary | ICD-10-CM

## 2024-12-05 PROCEDURE — G8400 PT W/DXA NO RESULTS DOC: HCPCS | Performed by: INTERNAL MEDICINE

## 2024-12-05 PROCEDURE — G8427 DOCREV CUR MEDS BY ELIG CLIN: HCPCS | Performed by: INTERNAL MEDICINE

## 2024-12-05 PROCEDURE — 1159F MED LIST DOCD IN RCRD: CPT | Performed by: INTERNAL MEDICINE

## 2024-12-05 PROCEDURE — G8420 CALC BMI NORM PARAMETERS: HCPCS | Performed by: INTERNAL MEDICINE

## 2024-12-05 PROCEDURE — 99213 OFFICE O/P EST LOW 20 MIN: CPT | Performed by: INTERNAL MEDICINE

## 2024-12-05 PROCEDURE — 1036F TOBACCO NON-USER: CPT | Performed by: INTERNAL MEDICINE

## 2024-12-05 PROCEDURE — G8484 FLU IMMUNIZE NO ADMIN: HCPCS | Performed by: INTERNAL MEDICINE

## 2024-12-05 PROCEDURE — 1123F ACP DISCUSS/DSCN MKR DOCD: CPT | Performed by: INTERNAL MEDICINE

## 2024-12-05 PROCEDURE — 1090F PRES/ABSN URINE INCON ASSESS: CPT | Performed by: INTERNAL MEDICINE

## 2024-12-05 NOTE — PROGRESS NOTES
[facility] PHYS[facility]ANS [place] [facility]  [facility] CARDIOLOGY  [address]  [address] [place] [address]  Dept: [phone]  Dept Fax: [phone]  Loc: [phone]    Visit Date: 12/5/2024    Ms. Estrada is a 79 y.o. female  who presented for:  Chief Complaint   Patient presents with    6 Month Follow-Up    Atrial Fibrillation       HPI:     History of Present Illness  79 F patient presents for a follow-up visit.    She reports no chest pain or discomfort and her breathing is normal. She does not experience lightheadedness or dizziness. Her atrial fibrillation (A. fib) has been well-managed since she started taking flecainide. She had a nosebleed this summer, which required emergency room treatment and nasal packing. Following this, she consulted an ENT specialist who prescribed a nasal cream. Since then, she has not experienced any further issues. She denies any falls or bleeding from other areas. She is currently on Eliquis, Flecainide, and Toprol.          Current Outpatient Medications:     flecainide (TAMBOCOR) 50 MG tablet, Take 1 tablet by mouth 2 times daily, Disp: 60 tablet, Rfl: 3    metoprolol tartrate 37.5 MG TABS, Take 37.5 mg by mouth 2 times daily, Disp: 60 tablet, Rfl: 3    apixaban (ELIQUIS) 5 MG TABS tablet, Take 1 tablet by mouth 2 times daily, Disp: 60 tablet, Rfl: 2    Cholecalciferol (VITAMIN D) 50 MCG (2000 UT) CAPS capsule, Take 1 capsule by mouth daily, Disp: , Rfl:     Cranberry 250 MG TABS, Take 500 mg by mouth daily, Disp: , Rfl:     Multiple Vitamin (MULTI VITAMIN DAILY PO), Take 1 tablet by mouth daily, Disp: , Rfl:     Past Medical History  Kirstin  has a past medical history of Atrial fibrillation (HCC).    Social History  Kirstin  reports that she has never smoked. She has never used smokeless tobacco. She reports that she does not currently use alcohol. She reports that she does not use drugs.    Family History  Kirstin family history is not on file.    Past

## 2024-12-05 NOTE — PATIENT INSTRUCTIONS
Your nurses today were XANDER Herrera and ZION Mcqueen  Your provider today was Dr. Romero  Phone number: [phone]

## 2024-12-05 NOTE — PROGRESS NOTES
6 month follow-up.   She is aware that appt on 6/18 with Dr. Kuhn has been cancelled.   She is on Eliquis and would like to get off of it.    She had a nose bleed this past summer and ended up in ER and they had to pack her nose.

## 2024-12-10 RX ORDER — FLECAINIDE ACETATE 50 MG/1
50 TABLET ORAL 2 TIMES DAILY
Qty: 180 TABLET | Refills: 3 | Status: SHIPPED | OUTPATIENT
Start: 2024-12-10

## 2024-12-10 RX ORDER — METOPROLOL TARTRATE 37.5 MG/1
37.5 TABLET ORAL 2 TIMES DAILY
Qty: 180 TABLET | Refills: 3 | Status: SHIPPED | OUTPATIENT
Start: 2024-12-10

## 2024-12-10 NOTE — TELEPHONE ENCOUNTER
Kirstin Estrada called requesting a refill on the following medications:  Requested Prescriptions     Pending Prescriptions Disp Refills    apixaban (ELIQUIS) 5 MG TABS tablet 60 tablet 2     Sig: Take 1 tablet by mouth 2 times daily    flecainide (TAMBOCOR) 50 MG tablet 60 tablet 3     Sig: Take 1 tablet by mouth 2 times daily    Metoprolol Tartrate 37.5 MG TABS 60 tablet 3     Sig: Take 37.5 mg by mouth 2 times daily     Pharmacy verified: [facility]  [place]      Date of last visit: 12/5/2024  Date of next visit (if applicable): 12/18/2025

## 2025-03-25 NOTE — PROGRESS NOTES
1109  Arouses to name on arrival to PACU ,   1114 awake and oriented , denies any pain or nausea   1130 resting resp easy   1145 resting resp easy   1155 pt awakens easily to name , denies any pain or nausea   1200 meets criteria for discharge , transported to     Patient Transferred to NPU Room 904 @1113      Upon arrival to the floor, patient greeted and oriented to room. Complete head to toe assessment completed per protocol. VSS, see flowsheet for details. Neuro assessment completed. Cardiac monitoring orders reviewed. Patient added to tele monitor in nursing station, rate and rhythm verified. Primary team notified of patient's transfer to floor. All current and transfer orders reviewed/reconciled per protocol. All emergency equipment set up in patient's room. Fall/seizure precautions initiated per providers orders. 4 Eyes skin assessment performed, see flowsheets for details. Reviewed assessment and rounding frequency with patient and family. Questions were encouraged and addressed. Repositioned patient for comfort with bed locked in lowest position, side rails up x 4, bed alarm set, and call light within reach. Instructed patient to call staff for mobility/assistance, verbalized understanding. No acute signs of distress noted at this time.           +++++ Special Considerations+++++++    EMU NURSING INTERVIEW  Pt admitted to EMU room ---, EMU team notified. Yes   Onset-When did your seizures start? 2024  Aura-Do you experience an aura? Headache, metallic taste    Symptoms-What symptoms do you experience? Convulsions   Triggers-Do you have any Triggers? Flashing lights   Do you bite your tongue? yes  Do become incontinent of bladder or bowels? Yes   Have you been told your BP or oxygen drops during an event? Yes     Bed locked, bed alarm on and call light in reach. Educated to call staff before ambulating. ducated to use event button when patient feels like they will have an event or when an event is suspected. Pt and family verbalize understanding.

## 2025-06-09 ENCOUNTER — HOSPITAL ENCOUNTER (OUTPATIENT)
Dept: MAMMOGRAPHY | Age: 80
Discharge: HOME | End: 2025-06-09
Payer: MEDICARE

## 2025-06-09 DIAGNOSIS — Z12.31 VISIT FOR SCREENING MAMMOGRAM: ICD-10-CM

## 2025-06-09 DIAGNOSIS — Z12.31 ENCOUNTER FOR SCREENING MAMMOGRAM FOR MALIGNANT NEOPLASM OF BREAST: ICD-10-CM

## 2025-06-09 PROCEDURE — 77063 BREAST TOMOSYNTHESIS BI: CPT
